# Patient Record
Sex: FEMALE | Race: WHITE | Employment: FULL TIME | URBAN - METROPOLITAN AREA
[De-identification: names, ages, dates, MRNs, and addresses within clinical notes are randomized per-mention and may not be internally consistent; named-entity substitution may affect disease eponyms.]

---

## 2017-01-31 PROCEDURE — 88305 TISSUE EXAM BY PATHOLOGIST: CPT | Performed by: INTERNAL MEDICINE

## 2017-02-16 ENCOUNTER — PATIENT MESSAGE (OUTPATIENT)
Dept: FAMILY MEDICINE CLINIC | Facility: CLINIC | Age: 52
End: 2017-02-16

## 2017-02-16 NOTE — TELEPHONE ENCOUNTER
From: Janelle Marking  To: Betty Navas DO  Sent: 2/16/2017 3:57 PM CST  Subject: Non-Urgent Medical Question    Good afternoon. Over the last couple of months I have been waking up every morning in a near panic attack.  My internal tremors are on ex

## 2017-03-05 ENCOUNTER — HOSPITAL ENCOUNTER (OUTPATIENT)
Age: 52
Discharge: HOME | End: 2017-03-05
Attending: EMERGENCY MEDICINE
Payer: COMMERCIAL

## 2017-03-05 VITALS
BODY MASS INDEX: 27 KG/M2 | WEIGHT: 180 LBS | HEART RATE: 94 BPM | RESPIRATION RATE: 17 BRPM | OXYGEN SATURATION: 97 % | TEMPERATURE: 100 F

## 2017-03-05 DIAGNOSIS — H66.001 ACUTE SUPPURATIVE OTITIS MEDIA OF RIGHT EAR WITHOUT SPONTANEOUS RUPTURE OF TYMPANIC MEMBRANE, RECURRENCE NOT SPECIFIED: Primary | ICD-10-CM

## 2017-03-05 DIAGNOSIS — R68.89 FLU-LIKE SYMPTOMS: ICD-10-CM

## 2017-03-05 PROCEDURE — 99214 OFFICE O/P EST MOD 30 MIN: CPT

## 2017-03-05 PROCEDURE — 99213 OFFICE O/P EST LOW 20 MIN: CPT

## 2017-03-05 RX ORDER — CEFDINIR 300 MG/1
300 CAPSULE ORAL 2 TIMES DAILY
Qty: 20 CAPSULE | Refills: 0 | Status: SHIPPED | OUTPATIENT
Start: 2017-03-05 | End: 2017-03-15

## 2017-03-05 RX ORDER — IBUPROFEN 800 MG/1
800 TABLET ORAL EVERY 6 HOURS PRN
COMMUNITY
End: 2017-10-05 | Stop reason: ALTCHOICE

## 2017-03-05 NOTE — ED PROVIDER NOTES
Patient presents with:  Fever  Sore Throat  Ear Problem Pain (neurosensory)    HPI:     Ruth Otero is a 47year old female who presents with chief complaint of fever, sore throat, congestion, cough and ear pain.   Started 3 days ago with sore throat media  Flu-like symptoms    Plan:  1. Omnicef Rx (discussed minimal cross reactivity)  2. Supportive care - NSAID/APAP, antihistamine, flonase  3.   F/u with PCP in 3-4 days for re-evaluation, sooner if symptoms worsen      All results reviewed and discus

## 2017-03-07 ENCOUNTER — HOSPITAL ENCOUNTER (OUTPATIENT)
Age: 52
Discharge: HOME | End: 2017-03-07
Payer: COMMERCIAL

## 2017-03-07 ENCOUNTER — APPOINTMENT (OUTPATIENT)
Dept: GENERAL RADIOLOGY | Age: 52
End: 2017-03-07
Attending: NURSE PRACTITIONER
Payer: COMMERCIAL

## 2017-03-07 VITALS
TEMPERATURE: 99 F | BODY MASS INDEX: 26.66 KG/M2 | RESPIRATION RATE: 16 BRPM | DIASTOLIC BLOOD PRESSURE: 81 MMHG | OXYGEN SATURATION: 96 % | HEART RATE: 92 BPM | WEIGHT: 180 LBS | HEIGHT: 69 IN | SYSTOLIC BLOOD PRESSURE: 135 MMHG

## 2017-03-07 DIAGNOSIS — J40 BRONCHITIS: Primary | ICD-10-CM

## 2017-03-07 LAB — POCT RAPID STREP: NEGATIVE

## 2017-03-07 PROCEDURE — 71020 XR CHEST PA + LAT CHEST (CPT=71020): CPT

## 2017-03-07 PROCEDURE — 99214 OFFICE O/P EST MOD 30 MIN: CPT

## 2017-03-07 PROCEDURE — 87430 STREP A AG IA: CPT | Performed by: NURSE PRACTITIONER

## 2017-03-07 PROCEDURE — 87081 CULTURE SCREEN ONLY: CPT | Performed by: NURSE PRACTITIONER

## 2017-03-07 RX ORDER — DOXYCYCLINE HYCLATE 100 MG/1
100 CAPSULE ORAL 2 TIMES DAILY
Qty: 14 CAPSULE | Refills: 0 | Status: SHIPPED | OUTPATIENT
Start: 2017-03-07 | End: 2017-03-14

## 2017-03-07 RX ORDER — PREDNISONE 20 MG/1
20 TABLET ORAL 2 TIMES DAILY
Qty: 10 TABLET | Refills: 0 | Status: SHIPPED | OUTPATIENT
Start: 2017-03-07 | End: 2017-03-12

## 2017-03-07 RX ORDER — CODEINE PHOSPHATE AND GUAIFENESIN 10; 100 MG/5ML; MG/5ML
5 SOLUTION ORAL EVERY 8 HOURS PRN
Qty: 120 ML | Refills: 0 | Status: SHIPPED | OUTPATIENT
Start: 2017-03-07 | End: 2017-05-08

## 2017-03-07 RX ORDER — ALBUTEROL SULFATE 90 UG/1
2 AEROSOL, METERED RESPIRATORY (INHALATION) EVERY 4 HOURS PRN
Qty: 1 INHALER | Refills: 1 | Status: SHIPPED | OUTPATIENT
Start: 2017-03-07 | End: 2017-04-06

## 2017-03-07 NOTE — ED INITIAL ASSESSMENT (HPI)
Pt here w/ c/o cough, fever at highest 102.8, sore throat.  Pt states prod only in the morning of dark brown

## 2017-03-08 NOTE — ED PROVIDER NOTES
Patient Seen in: [address] [facility]    History   Patient presents with:  Cough/URI    Stated Complaint: sore throat,cough    HPI  50-year-old female who presents to the immediate care with complaints of sore throat, productive cough, fever o puffs into the lungs every 4 (four) hours as needed. guaiFENesin-codeine (CHERATUSSIN AC) 100-10 MG/5ML Oral Solution,  Take 5 mL by mouth every 8 (eight) hours as needed for cough.    ibuprofen 800 MG Oral Tab,  Take 800 mg by mouth every 6 (six) hours a well-nourished. No distress. HENT:   Head: Normocephalic and atraumatic.    Right Ear: Hearing, tympanic membrane and ear canal normal.   Left Ear: Hearing, tympanic membrane, external ear and ear canal normal.   Nose: Right sinus exhibits maxillary sinus laboratory results with the patient. I discussed the diagnosis and need for followup with their primary care physician for further evaluation and care.  Patient states they understand diagnosis, followup plan and agree with and understand  discharge instruc

## 2017-03-13 ENCOUNTER — PATIENT MESSAGE (OUTPATIENT)
Dept: FAMILY MEDICINE CLINIC | Facility: CLINIC | Age: 52
End: 2017-03-13

## 2017-03-13 NOTE — TELEPHONE ENCOUNTER
Was seen in the IC on 3/5/17 and 3/7/17 for sore throat and cough  Given meds:  Doxycycline Hyclate 100 MG Oral Ca pX 7 days  predniSONE 20 MG Oral Tab X 5 days  Albuterol Sulfate  (90 Base) MCG/ACT Inhalation Aero Soln and guaiFENesin-codeine (KENNEDI

## 2017-03-13 NOTE — TELEPHONE ENCOUNTER
From: Venice Irving  To: Luis Garcia DO  Sent: 3/13/2017 8:06 AM CDT  Subject: Non-Urgent Medical Question    Cleavon Seip to urgent care on 3-5 with flu symptoms, fever and ear ache. I was given an antibiotic which I take twice per day.  I went back to urge

## 2017-05-02 DIAGNOSIS — E04.2 MULTIPLE THYROID NODULES: Primary | ICD-10-CM

## 2017-05-02 NOTE — PROGRESS NOTES
Notified via my chart to schedule appt for thyroid US,  Also referred to podiatry, Referred to Dr. Morales December and [address], [address] [phone]

## 2017-05-08 ENCOUNTER — HOSPITAL ENCOUNTER (OUTPATIENT)
Age: 52
Discharge: HOME | End: 2017-05-08
Attending: FAMILY MEDICINE
Payer: COMMERCIAL

## 2017-05-08 VITALS
HEIGHT: 69 IN | BODY MASS INDEX: 26.66 KG/M2 | WEIGHT: 180 LBS | RESPIRATION RATE: 14 BRPM | OXYGEN SATURATION: 97 % | DIASTOLIC BLOOD PRESSURE: 79 MMHG | SYSTOLIC BLOOD PRESSURE: 113 MMHG | HEART RATE: 76 BPM | TEMPERATURE: 98 F

## 2017-05-08 DIAGNOSIS — N30.00 ACUTE CYSTITIS WITHOUT HEMATURIA: Primary | ICD-10-CM

## 2017-05-08 PROCEDURE — 99214 OFFICE O/P EST MOD 30 MIN: CPT

## 2017-05-08 PROCEDURE — 87086 URINE CULTURE/COLONY COUNT: CPT | Performed by: FAMILY MEDICINE

## 2017-05-08 PROCEDURE — 87147 CULTURE TYPE IMMUNOLOGIC: CPT | Performed by: FAMILY MEDICINE

## 2017-05-08 PROCEDURE — 81002 URINALYSIS NONAUTO W/O SCOPE: CPT | Performed by: FAMILY MEDICINE

## 2017-05-08 RX ORDER — PHENAZOPYRIDINE HYDROCHLORIDE 200 MG/1
200 TABLET, FILM COATED ORAL 3 TIMES DAILY PRN
Qty: 6 TABLET | Refills: 0 | Status: SHIPPED | OUTPATIENT
Start: 2017-05-08 | End: 2017-05-10

## 2017-05-08 RX ORDER — NITROFURANTOIN 25; 75 MG/1; MG/1
100 CAPSULE ORAL 2 TIMES DAILY
Qty: 14 CAPSULE | Refills: 0 | Status: SHIPPED | OUTPATIENT
Start: 2017-05-08 | End: 2021-01-21

## 2017-05-08 NOTE — ED PROVIDER NOTES
Patient presents with:  Urinary Symptoms (urologic)    HPI:     Oneyda Motley is a 47year old female who presents with suspected symptoms of UTI  Onset of symptoms: 2  days  Complains of:  - Dysuria: yes   - Frequency and  urgency of urination: yes tenderness.   Lungs: clear to auscultation bilaterally  Heart: S1, S2 normal, no murmur, click, rub or gallop, regular rate and rhythm  Abdomen: soft, non-tender; bowel sounds normal; no masses,  no organomegaly  Pulses: 2+ and symmetric  Skin: Skin color, days  Urine culture ordered   Push fluids  Cranberry juice  Monitor for amount of bleeding in urine  Monitor urine output and fluid intake  Present to ED for any worsening abdominal pain, hematuria, difficulty urination, acute urinary retention, worsening

## 2017-05-08 NOTE — ED INITIAL ASSESSMENT (HPI)
Pt sts began with dysuria on Saturday. Today with frequency, worsening dysuria. Denies back pain, fever, nausea.

## 2017-05-11 ENCOUNTER — HOSPITAL ENCOUNTER (OUTPATIENT)
Dept: ULTRASOUND IMAGING | Age: 52
Discharge: HOME | End: 2017-05-11
Attending: FAMILY MEDICINE
Payer: COMMERCIAL

## 2017-05-11 DIAGNOSIS — E04.2 MULTIPLE THYROID NODULES: ICD-10-CM

## 2017-05-11 PROCEDURE — 76536 US EXAM OF HEAD AND NECK: CPT | Performed by: FAMILY MEDICINE

## 2017-05-12 ENCOUNTER — TELEPHONE (OUTPATIENT)
Dept: FAMILY MEDICINE CLINIC | Facility: CLINIC | Age: 52
End: 2017-05-12

## 2017-05-12 NOTE — TELEPHONE ENCOUNTER
Left message on answering machine for patient to return phone call. Recall placed in computer for 1 year,.

## 2017-05-12 NOTE — TELEPHONE ENCOUNTER
----- Message from Erin Rodriguez DO sent at 5/12/2017  8:15 AM CDT -----  Can notify Ayaka Christian her tyroid nodules have not changed, and recommend a recall US in 1 year

## 2017-05-19 ENCOUNTER — TELEPHONE (OUTPATIENT)
Dept: FAMILY MEDICINE CLINIC | Facility: CLINIC | Age: 52
End: 2017-05-19

## 2017-05-19 DIAGNOSIS — Z01.818 PREOP EXAMINATION: Primary | ICD-10-CM

## 2017-05-19 NOTE — TELEPHONE ENCOUNTER
Orders are in folder. Labs and ekg ordered per request from Dr. Munoz [facility] office.

## 2017-06-04 ENCOUNTER — HOSPITAL ENCOUNTER (OUTPATIENT)
Age: 52
Discharge: HOME | End: 2017-06-04
Payer: COMMERCIAL

## 2017-06-04 VITALS
OXYGEN SATURATION: 99 % | HEART RATE: 83 BPM | HEIGHT: 69 IN | BODY MASS INDEX: 26.66 KG/M2 | DIASTOLIC BLOOD PRESSURE: 90 MMHG | SYSTOLIC BLOOD PRESSURE: 124 MMHG | WEIGHT: 180 LBS | RESPIRATION RATE: 16 BRPM | TEMPERATURE: 97 F

## 2017-06-04 DIAGNOSIS — N30.01 ACUTE CYSTITIS WITH HEMATURIA: Primary | ICD-10-CM

## 2017-06-04 PROCEDURE — 87086 URINE CULTURE/COLONY COUNT: CPT | Performed by: PHYSICIAN ASSISTANT

## 2017-06-04 PROCEDURE — 87147 CULTURE TYPE IMMUNOLOGIC: CPT | Performed by: PHYSICIAN ASSISTANT

## 2017-06-04 PROCEDURE — 81002 URINALYSIS NONAUTO W/O SCOPE: CPT | Performed by: PHYSICIAN ASSISTANT

## 2017-06-04 PROCEDURE — 99214 OFFICE O/P EST MOD 30 MIN: CPT

## 2017-06-04 RX ORDER — SULFAMETHOXAZOLE AND TRIMETHOPRIM 800; 160 MG/1; MG/1
1 TABLET ORAL 2 TIMES DAILY
Qty: 14 TABLET | Refills: 0 | Status: SHIPPED | OUTPATIENT
Start: 2017-06-04 | End: 2017-06-11

## 2017-06-04 NOTE — ED INITIAL ASSESSMENT (HPI)
[facility], voiding in small amounts, dysuria,sensation of incomplete emptying of her bladder since this morning. Pt denies flank pain or fever.

## 2017-06-04 NOTE — ED PROVIDER NOTES
Patient Seen in: [address] [facility]    History   Patient presents with:  UTI    Stated Complaint: FREQ URINATION/PAIN/BURNING    HPI    Patient is a 58-year-old female.   Patient awoke this morning with sudden onset urinary frequency, dysuria Status: Never Used                        Alcohol Use: Yes           1.8 oz/week       3 Standard drinks or equivalent per week      Review of Systems    Positive for stated complaint: FREQ URINATION/PAIN/BURNING  Other systems are as noted in HPI.   Juma penicillin allergy. Will initiate Bactrim. Urine culture will be sent. I discussed with patient that if she has further repetitive episodes of UTI, she should seek follow-up with urology.   At time of discharge, patient is well-appearing with normal parul

## 2017-06-19 ENCOUNTER — OFFICE VISIT (OUTPATIENT)
Dept: FAMILY MEDICINE CLINIC | Facility: CLINIC | Age: 52
End: 2017-06-19

## 2017-06-19 VITALS
BODY MASS INDEX: 27.03 KG/M2 | HEIGHT: 68 IN | RESPIRATION RATE: 16 BRPM | SYSTOLIC BLOOD PRESSURE: 108 MMHG | TEMPERATURE: 99 F | DIASTOLIC BLOOD PRESSURE: 64 MMHG | HEART RATE: 68 BPM | WEIGHT: 178.38 LBS

## 2017-06-19 DIAGNOSIS — Z01.818 PREOP EXAMINATION: Primary | ICD-10-CM

## 2017-06-19 DIAGNOSIS — M21.612 BUNION, LEFT FOOT: ICD-10-CM

## 2017-06-19 DIAGNOSIS — Z00.00 ROUTINE HEALTH MAINTENANCE: ICD-10-CM

## 2017-06-19 DIAGNOSIS — M79.672 FOOT PAIN, LEFT: ICD-10-CM

## 2017-06-19 DIAGNOSIS — M20.12 HALLUX VALGUS WITH BUNIONS OF LEFT FOOT: ICD-10-CM

## 2017-06-19 DIAGNOSIS — I15.9 SECONDARY HYPERTENSION: ICD-10-CM

## 2017-06-19 DIAGNOSIS — M21.612 HALLUX VALGUS WITH BUNIONS OF LEFT FOOT: ICD-10-CM

## 2017-06-19 PROCEDURE — 93000 ELECTROCARDIOGRAM COMPLETE: CPT | Performed by: FAMILY MEDICINE

## 2017-06-19 PROCEDURE — 99244 OFF/OP CNSLTJ NEW/EST MOD 40: CPT | Performed by: FAMILY MEDICINE

## 2017-06-19 NOTE — PROGRESS NOTES
Janelle Bell is a 47year old female who presents for a pre-operative physical exam. Patient is to have Bunion repair of the left 1st and 5th toes, to be done by Dr. Papo Carr at [facility] on 6/29/17.       HPI:   Pt complains of persistent left foot p GENERAL: feels well otherwise  SKIN: denies any unusual skin lesions  EYES:denies blurred vision or double vision  HEENT: denies nasal congestion, sinus pain or ST  LUNGS: denies shortness of breath with exertion  CARDIOVASCULAR: denies chest pain on exe [E]  CMP/CBC  Meds & Refills for this Visit:  No prescriptions requested or ordered in this encounter    Imaging & Consults:  ELECTROCARDIOGRAM, COMPLETE  Shows sinus bradycardia, otherwise normal EKG    MEDICALLY CLEAR FOR SURGERY

## 2017-06-21 ENCOUNTER — TELEPHONE (OUTPATIENT)
Dept: FAMILY MEDICINE CLINIC | Facility: CLINIC | Age: 52
End: 2017-06-21

## 2017-06-21 NOTE — TELEPHONE ENCOUNTER
Pt will have pre-op blood work drawn this weekend. Will fax H/P, EKG, and lab results once pt is cleared.

## 2017-06-24 ENCOUNTER — NURSE ONLY (OUTPATIENT)
Dept: FAMILY MEDICINE CLINIC | Facility: CLINIC | Age: 52
End: 2017-06-24

## 2017-06-24 DIAGNOSIS — Z01.818 PREOP EXAMINATION: ICD-10-CM

## 2017-06-24 DIAGNOSIS — M20.12 HALLUX VALGUS WITH BUNIONS OF LEFT FOOT: ICD-10-CM

## 2017-06-24 DIAGNOSIS — M21.612 BUNION, LEFT FOOT: ICD-10-CM

## 2017-06-24 DIAGNOSIS — I15.9 SECONDARY HYPERTENSION: ICD-10-CM

## 2017-06-24 DIAGNOSIS — M21.612 HALLUX VALGUS WITH BUNIONS OF LEFT FOOT: ICD-10-CM

## 2017-06-24 DIAGNOSIS — Z00.00 ROUTINE HEALTH MAINTENANCE: ICD-10-CM

## 2017-06-24 DIAGNOSIS — M79.672 FOOT PAIN, LEFT: ICD-10-CM

## 2017-06-24 PROCEDURE — 80050 GENERAL HEALTH PANEL: CPT | Performed by: FAMILY MEDICINE

## 2017-06-24 PROCEDURE — 80061 LIPID PANEL: CPT | Performed by: FAMILY MEDICINE

## 2017-06-24 PROCEDURE — 36415 COLL VENOUS BLD VENIPUNCTURE: CPT | Performed by: FAMILY MEDICINE

## 2017-06-24 PROCEDURE — 85610 PROTHROMBIN TIME: CPT | Performed by: FAMILY MEDICINE

## 2017-06-24 PROCEDURE — 85730 THROMBOPLASTIN TIME PARTIAL: CPT | Performed by: FAMILY MEDICINE

## 2017-06-26 ENCOUNTER — TELEPHONE (OUTPATIENT)
Dept: FAMILY MEDICINE CLINIC | Facility: CLINIC | Age: 52
End: 2017-06-26

## 2017-06-26 NOTE — TELEPHONE ENCOUNTER
Notes Recorded by Erica Salinas DO on 6/26/2017 at 4:59 PM CDT  Can forward with H&P  ------    Notes Recorded by Erica Salinas DO on 6/24/2017 at 9:49 PM CDT  Can notify. Cheyenne Rhodes her labs  look very good thyroid.  Blood count kidney and liver  function a

## 2017-06-26 NOTE — TELEPHONE ENCOUNTER
Pt notified by phone and verbalized understanding. Labs, EKG, and clearance faxed to Dr. Stephenie Way at [phone] and [address] [place] [address] at [phone].

## 2017-06-27 ENCOUNTER — TELEPHONE (OUTPATIENT)
Dept: FAMILY MEDICINE CLINIC | Facility: CLINIC | Age: 52
End: 2017-06-27

## 2017-06-27 NOTE — TELEPHONE ENCOUNTER
Dr Rowdy Ag office called, they needs a signed copy of the H&P, The last one there received was not signed,.  Please Fax  To  [phone]

## 2017-08-17 ENCOUNTER — PATIENT OUTREACH (OUTPATIENT)
Dept: FAMILY MEDICINE CLINIC | Facility: CLINIC | Age: 52
End: 2017-08-17

## 2017-08-17 NOTE — PROGRESS NOTES
Rubén Herman is due for mammogram.   Last mammogram date:  None in Epic. Mychart/Letter sent to patient.

## 2017-10-05 ENCOUNTER — OFFICE VISIT (OUTPATIENT)
Dept: FAMILY MEDICINE CLINIC | Facility: CLINIC | Age: 52
End: 2017-10-05

## 2017-10-05 VITALS
SYSTOLIC BLOOD PRESSURE: 112 MMHG | BODY MASS INDEX: 26.51 KG/M2 | HEART RATE: 80 BPM | DIASTOLIC BLOOD PRESSURE: 72 MMHG | OXYGEN SATURATION: 98 % | WEIGHT: 179 LBS | RESPIRATION RATE: 14 BRPM | TEMPERATURE: 98 F | HEIGHT: 69 IN

## 2017-10-05 DIAGNOSIS — M85.672 OTHER CYST OF BONE, LEFT ANKLE AND FOOT: ICD-10-CM

## 2017-10-05 DIAGNOSIS — Z01.818 PRE-OP EXAMINATION: ICD-10-CM

## 2017-10-05 DIAGNOSIS — M79.672 LEFT FOOT PAIN: Primary | ICD-10-CM

## 2017-10-05 PROCEDURE — 85730 THROMBOPLASTIN TIME PARTIAL: CPT | Performed by: FAMILY MEDICINE

## 2017-10-05 PROCEDURE — 85025 COMPLETE CBC W/AUTO DIFF WBC: CPT | Performed by: FAMILY MEDICINE

## 2017-10-05 PROCEDURE — 36415 COLL VENOUS BLD VENIPUNCTURE: CPT | Performed by: FAMILY MEDICINE

## 2017-10-05 PROCEDURE — 99243 OFF/OP CNSLTJ NEW/EST LOW 30: CPT | Performed by: FAMILY MEDICINE

## 2017-10-05 PROCEDURE — 93000 ELECTROCARDIOGRAM COMPLETE: CPT | Performed by: FAMILY MEDICINE

## 2017-10-05 PROCEDURE — 80053 COMPREHEN METABOLIC PANEL: CPT | Performed by: FAMILY MEDICINE

## 2017-10-05 PROCEDURE — 85610 PROTHROMBIN TIME: CPT | Performed by: FAMILY MEDICINE

## 2017-10-05 NOTE — H&P
Venice Irving is a 47year old female who presents for a pre-operative physical exam. Patient is to have removal of hardware and removal of cyst of the left foot, to be done by Dr. Danielle Murry at [facility] on 10/13/17.       HPI:   Pt complains : . Children: one grown son.    Exercise: minimal.  Diet: watches minimally     REVIEW OF SYSTEMS:   GENERAL: feels well otherwise  SKIN: denies any unusual skin lesions  EYES:denies blurred vision or double vision  HEENT: denies nasal congest injections      Complex regional pain syndrome type 1 of right upper extremity     Multiple thyroid nodules - stable, last checked in May 2017. Lev Álvarez Pt has no significant history of cardiac or pulmonary conditions.  Pt is a good surgical candidate and ma

## 2017-10-06 DIAGNOSIS — E87.6 HYPOKALEMIA: Primary | ICD-10-CM

## 2017-10-06 RX ORDER — POTASSIUM CHLORIDE 1500 MG/1
40 TABLET, FILM COATED, EXTENDED RELEASE ORAL ONCE
Qty: 2 TABLET | Refills: 0 | Status: SHIPPED | OUTPATIENT
Start: 2017-10-06 | End: 2017-10-06

## 2017-10-06 RX ORDER — POTASSIUM CHLORIDE 1500 MG/1
TABLET, FILM COATED, EXTENDED RELEASE ORAL
Qty: 180 TABLET | Refills: 0 | OUTPATIENT
Start: 2017-10-06

## 2017-10-06 NOTE — TELEPHONE ENCOUNTER
Detailed message left for pt with results and instructions to call back to confirm pharmacy. Pt will also need repeat potassium level on Tuesday 10/10/17 per Dr. Chuck Li. Order entered.

## 2017-10-06 NOTE — TELEPHONE ENCOUNTER
----- Message from Luna De Los Santos DO sent at 10/6/2017  3:11 PM CDT -----  Pls let pt know that her labs show a low potassium level. I will send in potassium tablets, I would like her to take 40 meq once. All other labs looked okay.  (the PT is still pendi

## 2017-10-07 RX ORDER — POTASSIUM CHLORIDE 1500 MG/1
TABLET, FILM COATED, EXTENDED RELEASE ORAL
Qty: 2 TABLET | Refills: 0 | OUTPATIENT
Start: 2017-10-07

## 2017-10-07 NOTE — TELEPHONE ENCOUNTER
Pt is only to take potassium 40 mEq once. She will have potassium level drawn Tuesday for recheck. No additional tablets needed at this time.

## 2017-10-10 ENCOUNTER — NURSE ONLY (OUTPATIENT)
Dept: FAMILY MEDICINE CLINIC | Facility: CLINIC | Age: 52
End: 2017-10-10

## 2017-10-10 DIAGNOSIS — E87.6 HYPOKALEMIA: ICD-10-CM

## 2017-10-10 PROCEDURE — 84132 ASSAY OF SERUM POTASSIUM: CPT | Performed by: FAMILY MEDICINE

## 2017-10-10 PROCEDURE — 36415 COLL VENOUS BLD VENIPUNCTURE: CPT | Performed by: FAMILY MEDICINE

## 2017-10-30 ENCOUNTER — TELEPHONE (OUTPATIENT)
Dept: FAMILY MEDICINE CLINIC | Facility: CLINIC | Age: 52
End: 2017-10-30

## 2017-10-30 NOTE — TELEPHONE ENCOUNTER
Patient is scheduled for 12/06/17 for a pre-op px. Patient is having bunion surgery on 12/15/17 at [facility] by Dr Betsey Carrel. No blood work needed just an EKG and px. Dr Betsey Carrel to send over orders.

## 2017-10-30 NOTE — TELEPHONE ENCOUNTER
Noted.    Future Appointments  Date Time Provider Irvin Dela Cruz   12/6/2017 4:15 PM Dorothy Ruelas [facility] EMG Josue Sue

## 2017-11-15 RX ORDER — TRIAMTERENE AND HYDROCHLOROTHIAZIDE 75; 50 MG/1; MG/1
TABLET ORAL
Qty: 45 TABLET | Refills: 6 | Status: SHIPPED | OUTPATIENT
Start: 2017-11-15 | End: 2018-12-13

## 2017-11-15 NOTE — TELEPHONE ENCOUNTER
LOV  10/05/2017  Marian Canjilon  Last refill  05/21/2014  #30 w. 6 RF  Future Appointments  Date Time Provider Irvin Dela Cruz   12/6/2017 4:15 PM Sondra Arzate [facility] ACE Grewal

## 2017-12-22 ENCOUNTER — OFFICE VISIT (OUTPATIENT)
Dept: FAMILY MEDICINE CLINIC | Facility: CLINIC | Age: 52
End: 2017-12-22

## 2017-12-22 ENCOUNTER — HOSPITAL ENCOUNTER (OUTPATIENT)
Dept: GENERAL RADIOLOGY | Age: 52
Discharge: HOME | End: 2017-12-22
Attending: FAMILY MEDICINE
Payer: COMMERCIAL

## 2017-12-22 VITALS
HEART RATE: 86 BPM | WEIGHT: 183 LBS | TEMPERATURE: 98 F | SYSTOLIC BLOOD PRESSURE: 120 MMHG | BODY MASS INDEX: 27 KG/M2 | DIASTOLIC BLOOD PRESSURE: 70 MMHG
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
## 2017-12-22 DIAGNOSIS — S89.91XA INJURY OF RIGHT KNEE, INITIAL ENCOUNTER: Primary | ICD-10-CM

## 2017-12-22 DIAGNOSIS — S89.91XA INJURY OF RIGHT KNEE, INITIAL ENCOUNTER: ICD-10-CM

## 2017-12-22 PROCEDURE — 99213 OFFICE O/P EST LOW 20 MIN: CPT | Performed by: FAMILY MEDICINE

## 2017-12-22 PROCEDURE — 73562 X-RAY EXAM OF KNEE 3: CPT | Performed by: FAMILY MEDICINE

## 2017-12-22 NOTE — PROGRESS NOTES
Nellie Jordan is a 47year old female. Patient presents with:  Knee Pain: right knee, fell down onto it, ice kating, swelling, hyper senitive skin.       HPI:   Esthela Bolls ice skating 1 week ago and feel right on the right knee, fell on the knee cap, and rol lb  03/07/17 : 180 lb      REVIEW OF SYSTEMS:   GENERAL HEALTH: feels well no complaints other than above   SKIN: denies any unusual skin lesions or rashes  RESPIRATORY: denies shortness of breath with exertion   CARDIOVASCULAR: denies chest pain on exerti

## 2018-05-14 ENCOUNTER — TELEPHONE (OUTPATIENT)
Dept: FAMILY MEDICINE CLINIC | Facility: CLINIC | Age: 53
End: 2018-05-14

## 2018-05-14 DIAGNOSIS — E04.2 MULTIPLE THYROID NODULES: Primary | ICD-10-CM

## 2018-05-14 NOTE — TELEPHONE ENCOUNTER
Pt is due for year follow up on thyroid nodules. Please place order and we can call patient to have her schedule test if appropriate. Forward to Dr. West Peace.

## 2018-06-12 ENCOUNTER — HOSPITAL ENCOUNTER (OUTPATIENT)
Age: 53
Discharge: HOME | End: 2018-06-12
Attending: FAMILY MEDICINE
Payer: COMMERCIAL

## 2018-06-12 VITALS
TEMPERATURE: 98 F | OXYGEN SATURATION: 97 % | RESPIRATION RATE: 16 BRPM | HEART RATE: 65 BPM | DIASTOLIC BLOOD PRESSURE: 89 MMHG | SYSTOLIC BLOOD PRESSURE: 127 MMHG

## 2018-06-12 DIAGNOSIS — J01.90 ACUTE NON-RECURRENT SINUSITIS, UNSPECIFIED LOCATION: Primary | ICD-10-CM

## 2018-06-12 DIAGNOSIS — H66.001 ACUTE SUPPURATIVE OTITIS MEDIA OF RIGHT EAR WITHOUT SPONTANEOUS RUPTURE OF TYMPANIC MEMBRANE, RECURRENCE NOT SPECIFIED: ICD-10-CM

## 2018-06-12 PROCEDURE — 99213 OFFICE O/P EST LOW 20 MIN: CPT

## 2018-06-12 PROCEDURE — 99214 OFFICE O/P EST MOD 30 MIN: CPT

## 2018-06-12 RX ORDER — CLARITHROMYCIN 500 MG/1
500 TABLET, COATED ORAL 2 TIMES DAILY
Qty: 20 TABLET | Refills: 0 | Status: SHIPPED | OUTPATIENT
Start: 2018-06-12 | End: 2018-06-22

## 2018-06-12 RX ORDER — FLUTICASONE PROPIONATE 50 MCG
2 SPRAY, SUSPENSION (ML) NASAL DAILY
Qty: 16 G | Refills: 0 | Status: SHIPPED | OUTPATIENT
Start: 2018-06-12 | End: 2018-07-11

## 2018-06-12 NOTE — ED PROVIDER NOTES
Patient Seen in: [address] [facility]    History   Patient presents with:  Sinusitis  Allergic Rxn Allergies (immune)    Stated Complaint: cold-like symptoms    HPI    58-year-old who presents for sinus pressure, congestion and cough.  It start systems are as noted in HPI. Constitutional and vital signs reviewed. All other systems reviewed and negative except as noted above.     Physical Exam   ED Triage Vitals [06/12/18 1715]  BP: 127/89  Pulse: 65  Resp: 16  Temp: 97.5 °F (36.4 °C)  Temp s diagnosis)  Acute suppurative otitis media of right ear without spontaneous rupture of tympanic membrane, recurrence not specified    Disposition:  Discharge  6/12/2018  5:31 pm    Follow-up:  Addie Sanders, [address] [facility] [phone]  6

## 2018-06-12 NOTE — ED INITIAL ASSESSMENT (HPI)
Pt states she has had sinus issues since last week with sneezing and congestion. States since last night neck soreness, worsening congestion and itchy sensation to face and head. Taking dayquil, no benadry or allergy meds. No rash noted.  Felt her lymph nod

## 2018-07-11 ENCOUNTER — OFFICE VISIT (OUTPATIENT)
Dept: FAMILY MEDICINE CLINIC | Facility: CLINIC | Age: 53
End: 2018-07-11

## 2018-07-11 ENCOUNTER — HOSPITAL ENCOUNTER (OUTPATIENT)
Dept: GENERAL RADIOLOGY | Age: 53
Discharge: HOME | End: 2018-07-11
Attending: FAMILY MEDICINE
Payer: COMMERCIAL

## 2018-07-11 VITALS
WEIGHT: 188.19 LBS | RESPIRATION RATE: 14 BRPM | DIASTOLIC BLOOD PRESSURE: 80 MMHG | BODY MASS INDEX: 28.52 KG/M2 | TEMPERATURE: 99 F | HEART RATE: 70 BPM | SYSTOLIC BLOOD PRESSURE: 120 MMHG | HEIGHT: 68 IN

## 2018-07-11 DIAGNOSIS — M79.672 FOOT PAIN, LEFT: ICD-10-CM

## 2018-07-11 DIAGNOSIS — M25.522 PAIN OF BOTH ELBOWS: ICD-10-CM

## 2018-07-11 DIAGNOSIS — M25.532 BILATERAL WRIST PAIN: Primary | ICD-10-CM

## 2018-07-11 DIAGNOSIS — R20.2 PARESTHESIA OF BOTH HANDS: ICD-10-CM

## 2018-07-11 DIAGNOSIS — M25.531 BILATERAL WRIST PAIN: Primary | ICD-10-CM

## 2018-07-11 DIAGNOSIS — M25.521 PAIN OF BOTH ELBOWS: ICD-10-CM

## 2018-07-11 LAB
ALBUMIN SERPL-MCNC: 3.7 G/DL (ref 3.5–4.8)
ALP LIVER SERPL-CCNC: 97 U/L (ref 41–108)
ALT SERPL-CCNC: 23 U/L (ref 14–54)
AST SERPL-CCNC: 20 U/L (ref 15–41)
BILIRUB SERPL-MCNC: 0.7 MG/DL (ref 0.1–2)
BUN BLD-MCNC: 16 MG/DL (ref 8–20)
CALCIUM BLD-MCNC: 9 MG/DL (ref 8.3–10.3)
CHLORIDE: 102 MMOL/L (ref 101–111)
CO2: 29 MMOL/L (ref 22–32)
CREAT BLD-MCNC: 0.97 MG/DL (ref 0.55–1.02)
FREE T4: 1 NG/DL (ref 0.9–1.8)
GLUCOSE BLD-MCNC: 88 MG/DL (ref 70–99)
M PROTEIN MFR SERPL ELPH: 7.4 G/DL (ref 6.1–8.3)
POTASSIUM SERPL-SCNC: 3.6 MMOL/L (ref 3.6–5.1)
SODIUM SERPL-SCNC: 139 MMOL/L (ref 136–144)
TSI SER-ACNC: 3.09 MIU/ML (ref 0.35–5.5)

## 2018-07-11 PROCEDURE — 84439 ASSAY OF FREE THYROXINE: CPT | Performed by: FAMILY MEDICINE

## 2018-07-11 PROCEDURE — 84443 ASSAY THYROID STIM HORMONE: CPT | Performed by: FAMILY MEDICINE

## 2018-07-11 PROCEDURE — 73630 X-RAY EXAM OF FOOT: CPT | Performed by: FAMILY MEDICINE

## 2018-07-11 PROCEDURE — 80053 COMPREHEN METABOLIC PANEL: CPT | Performed by: FAMILY MEDICINE

## 2018-07-11 PROCEDURE — 99214 OFFICE O/P EST MOD 30 MIN: CPT | Performed by: FAMILY MEDICINE

## 2018-07-11 PROCEDURE — 36415 COLL VENOUS BLD VENIPUNCTURE: CPT | Performed by: FAMILY MEDICINE

## 2018-07-11 NOTE — PROGRESS NOTES
Janelle Bell is a 47year old female. HPI:   Case Michaud is here for evaluation of bilateral wrist pain, she cannot recall any trauma, but she is on a computer all day and denies any elbow pain.  She also has left foot pain, in the same area where she had suspicious lesions  HEENT: atraumatic, normocephalic,ears and throat are clear  NECK: supple,no adenopathy,no bruits  LUNGS: clear to auscultation  CARDIO: RRR without murmur  GI: good BS's,no masses, HSM or tenderness  EXTREMITIES: no cyanosis, clubbing o

## 2018-07-12 ENCOUNTER — TELEPHONE (OUTPATIENT)
Dept: FAMILY MEDICINE CLINIC | Facility: CLINIC | Age: 53
End: 2018-07-12

## 2018-07-12 NOTE — TELEPHONE ENCOUNTER
----- Message from Lorie Gentile DO sent at 7/12/2018  8:05 AM CDT -----  Can notify Luke March, that her labs all look fine, and her thyroid labs are good as well, lets try and increase her potassium intake ,maybe some OJ and a banana daily, also makes sure

## 2018-07-23 DIAGNOSIS — M79.672 PAIN IN BOTH FEET: Primary | ICD-10-CM

## 2018-07-23 DIAGNOSIS — M79.671 PAIN IN BOTH FEET: Primary | ICD-10-CM

## 2018-08-20 PROBLEM — M77.42 METATARSALGIA, LEFT FOOT: Status: ACTIVE | Noted: 2018-08-20

## 2018-10-22 ENCOUNTER — HOSPITAL ENCOUNTER (OUTPATIENT)
Age: 53
Discharge: HOME | End: 2018-10-22
Attending: FAMILY MEDICINE
Payer: COMMERCIAL

## 2018-10-22 VITALS
DIASTOLIC BLOOD PRESSURE: 79 MMHG | WEIGHT: 180 LBS | TEMPERATURE: 98 F | RESPIRATION RATE: 16 BRPM | HEART RATE: 64 BPM | HEIGHT: 69 IN | SYSTOLIC BLOOD PRESSURE: 119 MMHG | BODY MASS INDEX: 26.66 KG/M2 | OXYGEN SATURATION: 97 %

## 2018-10-22 DIAGNOSIS — J30.89 NON-SEASONAL ALLERGIC RHINITIS, UNSPECIFIED TRIGGER: ICD-10-CM

## 2018-10-22 DIAGNOSIS — J01.00 ACUTE NON-RECURRENT MAXILLARY SINUSITIS: ICD-10-CM

## 2018-10-22 DIAGNOSIS — R06.02 SHORTNESS OF BREATH: Primary | ICD-10-CM

## 2018-10-22 DIAGNOSIS — J98.01 ACUTE BRONCHOSPASM: ICD-10-CM

## 2018-10-22 PROCEDURE — 94640 AIRWAY INHALATION TREATMENT: CPT

## 2018-10-22 PROCEDURE — 99214 OFFICE O/P EST MOD 30 MIN: CPT

## 2018-10-22 RX ORDER — PREDNISONE 20 MG/1
TABLET ORAL
Qty: 10 TABLET | Refills: 0 | Status: SHIPPED | OUTPATIENT
Start: 2018-10-22 | End: 2019-02-28

## 2018-10-22 RX ORDER — ALBUTEROL SULFATE 90 UG/1
2 AEROSOL, METERED RESPIRATORY (INHALATION) EVERY 4 HOURS PRN
Qty: 1 INHALER | Refills: 0 | Status: SHIPPED | OUTPATIENT
Start: 2018-10-22 | End: 2019-02-28

## 2018-10-22 RX ORDER — IPRATROPIUM BROMIDE AND ALBUTEROL SULFATE 2.5; .5 MG/3ML; MG/3ML
3 SOLUTION RESPIRATORY (INHALATION) ONCE
Status: COMPLETED | OUTPATIENT
Start: 2018-10-22 | End: 2018-10-22

## 2018-10-22 RX ORDER — AMOXICILLIN AND CLAVULANATE POTASSIUM 875; 125 MG/1; MG/1
875 TABLET, FILM COATED ORAL 2 TIMES DAILY
Qty: 20 TABLET | Refills: 0 | Status: SHIPPED | OUTPATIENT
Start: 2018-10-22 | End: 2019-02-28

## 2018-10-22 RX ORDER — PREDNISONE 20 MG/1
40 TABLET ORAL ONCE
Status: COMPLETED | OUTPATIENT
Start: 2018-10-22 | End: 2018-10-22

## 2018-10-22 NOTE — ED PROVIDER NOTES
Patient Seen in: [address] [facility]    History   Patient presents with:  Sinusitis  Laryngitis  Shortness Of Breath    Stated Complaint: hard to breath voice is hoarse    HPI    This 67-year-old female presents to the office with complaint o POLYPECTOMY 19729 N/A 1/31/2017    Performed by Surekha Hernandez MD at [address] [place] [address]  1/17    esophageal nodule (papilloma)       Family history reviewed and is not pertinent to presenting problem.     Social History    Tobacco Use office. Repeat lung exam shows increase in air exchange. Patient states that she feels like her chest is looser and she no longer feels short of breath. She is stable for discharge to home. Symptomatic treatment is discussed.   I do believe that the p Wheezing or Shortness of Breath. CARRY YOUR INHALER WITH YOU. Qty: 1 Inhaler Refills: 0           Take the Augmentin 875 mg 1 tablet with breakfast and dinner for the next 10 days.   While you are on the antibiotics, eat yogurt or take probiotics to help r

## 2018-12-13 RX ORDER — TRIAMTERENE AND HYDROCHLOROTHIAZIDE 75; 50 MG/1; MG/1
TABLET ORAL
Qty: 45 TABLET | Refills: 6 | Status: SHIPPED | OUTPATIENT
Start: 2018-12-13 | End: 2019-08-13 | Stop reason: SDUPTHER

## 2018-12-13 NOTE — TELEPHONE ENCOUNTER
Last refill listed as historical  Last BUN 16, creatinine 0.97 on 7/11/18  Last OV 7/11/18, /80  No future appointments. Thank you.

## 2019-01-07 DIAGNOSIS — R25.1 TREMOR DUE TO DISORDER OF CNS: Primary | ICD-10-CM

## 2019-01-07 DIAGNOSIS — G96.9 TREMOR DUE TO DISORDER OF CNS: Primary | ICD-10-CM

## 2019-01-07 DIAGNOSIS — R27.9 COORDINATION DISORDER: ICD-10-CM

## 2019-02-28 ENCOUNTER — APPOINTMENT (OUTPATIENT)
Dept: LAB | Age: 54
End: 2019-02-28
Attending: Other
Payer: COMMERCIAL

## 2019-02-28 ENCOUNTER — OFFICE VISIT (OUTPATIENT)
Dept: NEUROLOGY | Facility: CLINIC | Age: 54
End: 2019-02-28

## 2019-02-28 VITALS
DIASTOLIC BLOOD PRESSURE: 86 MMHG | HEART RATE: 60 BPM | RESPIRATION RATE: 16 BRPM | HEIGHT: 68 IN | SYSTOLIC BLOOD PRESSURE: 120 MMHG | BODY MASS INDEX: 29.7 KG/M2 | WEIGHT: 196 LBS

## 2019-02-28 DIAGNOSIS — R29.898 RIGHT HAND WEAKNESS: ICD-10-CM

## 2019-02-28 DIAGNOSIS — R49.0 HOARSENESS OF VOICE: ICD-10-CM

## 2019-02-28 DIAGNOSIS — G25.2 KINETIC TREMOR: ICD-10-CM

## 2019-02-28 DIAGNOSIS — R39.15 URINARY URGENCY: ICD-10-CM

## 2019-02-28 DIAGNOSIS — R29.898 LEFT HAND WEAKNESS: ICD-10-CM

## 2019-02-28 DIAGNOSIS — H53.8 BLURRY VISION, BILATERAL: ICD-10-CM

## 2019-02-28 DIAGNOSIS — R49.0 HOARSENESS OF VOICE: Primary | ICD-10-CM

## 2019-02-28 PROCEDURE — 83519 RIA NONANTIBODY: CPT

## 2019-02-28 PROCEDURE — 36415 COLL VENOUS BLD VENIPUNCTURE: CPT

## 2019-02-28 PROCEDURE — 99245 OFF/OP CONSLTJ NEW/EST HI 55: CPT | Performed by: OTHER

## 2019-02-28 PROCEDURE — 84238 ASSAY NONENDOCRINE RECEPTOR: CPT

## 2019-02-28 RX ORDER — GABAPENTIN 100 MG/1
CAPSULE ORAL
Qty: 270 CAPSULE | Refills: 0 | Status: SHIPPED | OUTPATIENT
Start: 2019-02-28 | End: 2019-03-27

## 2019-02-28 NOTE — PROGRESS NOTES
[facility]   Neurology- INITIAL CLINIC VISIT  2019, 1:07 PM     Gayla James Patient Status:  No patient class for patient encounter    1965 MRN [id number]   Location [address] [facility] Anand Shea fingers, and her right hand locks up. Tremors are usually when holding a cup or stirring. She often (last 6 months) notices a hoarse voice intermittently for past few months. It can occur at the end of the day, and last 1-2 days.  Sometimes she has general TVA)   • COLONOSCOPY, POSSIBLE BIOPSY, POSSIBLE POLYPECTOMY 72295 N/A 1/31/2017    Performed by Fredy Mckeon MD at [address] [place] [address]   • ESOPHAGOGASTRODUODENOSCOPY, POSSIBLE BIOPSY, POSSIBLE POLYPECTOMY 50049 N/A 1/31/2017    Performed by Skylar Isidro Extraocular movements were full. Reported \"haze\" that was delayed, with upward gaze, not true double image. There was no face, jaw, palate or tongue weakness or atrophy. Hearing was grossly intact.  Shoulder shrug was normal. Neck flexion 4/5  Motor exam EMG  Kinetic right greater than left tremor- consistent with essential tremor. Prior history of adverse effects with propranolol. Although not first line agent, trial of gabapentin recommended for symptomatic treatment of this and possible CTS.     Requeste

## 2019-03-01 DIAGNOSIS — G96.9 TREMOR DUE TO DISORDER OF CNS: Primary | ICD-10-CM

## 2019-03-01 DIAGNOSIS — R25.1 TREMOR DUE TO DISORDER OF CNS: Primary | ICD-10-CM

## 2019-03-04 LAB
ACETYLCHOLINE BINDING AB: 0 NMOL/L
ACETYLCHOLINE BLOCKING AB: 0 %

## 2019-03-15 ENCOUNTER — PROCEDURE VISIT (OUTPATIENT)
Dept: NEUROLOGY | Facility: CLINIC | Age: 54
End: 2019-03-15

## 2019-03-15 DIAGNOSIS — R29.898 RIGHT HAND WEAKNESS: Primary | ICD-10-CM

## 2019-03-15 PROCEDURE — 95885 MUSC TST DONE W/NERV TST LIM: CPT | Performed by: OTHER

## 2019-03-15 PROCEDURE — 95912 NRV CNDJ TEST 11-12 STUDIES: CPT | Performed by: OTHER

## 2019-03-15 PROCEDURE — 95937 NEUROMUSCULAR JUNCTION TEST: CPT | Performed by: OTHER

## 2019-03-15 NOTE — PROCEDURES
[facility]  Nerve Conduction & Electromyography Report            Patient: Jennifer Merritt  Patient ID: [id number]  YOB: 1965  Age: 49 Years 1 Months  Referring MCHRIS: Priscilla Thornton M.D.: Sia Zamorano, B.Elbow ADM 6.46  11.6  94.5 ?115 100     B. Elbow - Ulnar Wrist 24 62 ?49         A. Elbow ADM 8.07  11.0  95.5 ?115 100     A. Elbow -    B. Elbow 11 68 ?49   L LEG      Tibial Ankle AH 4.58 ?6.00 20.0 ?3.0 100   ?50.00 Tibial Ankle - AH 8        Peron 2. 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  3. There is no evidence of a neuromuscular junction disorder. 4. There is no evidence of a polyneuropathy or diffuse polyradiculopathy.       Becky Lucas, DO  Neur

## 2019-03-18 ENCOUNTER — OFFICE VISIT (OUTPATIENT)
Dept: FAMILY MEDICINE CLINIC | Facility: CLINIC | Age: 54
End: 2019-03-18

## 2019-03-18 VITALS
TEMPERATURE: 99 F | BODY MASS INDEX: 30 KG/M2 | HEART RATE: 72 BPM | RESPIRATION RATE: 12 BRPM | SYSTOLIC BLOOD PRESSURE: 118 MMHG | WEIGHT: 199.81 LBS | DIASTOLIC BLOOD PRESSURE: 82 MMHG

## 2019-03-18 DIAGNOSIS — G56.01 CARPAL TUNNEL SYNDROME ON RIGHT: ICD-10-CM

## 2019-03-18 DIAGNOSIS — M25.531 WRIST PAIN, RIGHT: Primary | ICD-10-CM

## 2019-03-18 PROCEDURE — 99213 OFFICE O/P EST LOW 20 MIN: CPT | Performed by: FAMILY MEDICINE

## 2019-03-18 NOTE — PROGRESS NOTES
Haresh Bellamy is a 49year old female.   HPI:   Naomi Wyman is here for discussion of persistent right wrist and hand pain with paresthesia and weakness of her  strength, she is on a computer all day and has noted more issues with her right hand strengt Right arm, Patient Position: Sitting, Cuff Size: large)   Pulse 72   Temp 98.6 °F (37 °C) (Temporal)   Resp 12   Wt 199 lb 12.8 oz   BMI 30.38 kg/m²   GENERAL: well developed, well nourished,in no apparent distress  SKIN: no rashes,no suspicious lesions  H

## 2019-03-18 NOTE — PROGRESS NOTES
Location of pain: right wrist  Pain score:  6/10, 8/10 when trying to use it  Pain will sometimes radiate up to elbow  Putting pressure will hurt more. Types all day. Sometimes cannot  how she is typing and will miss a key.   Duration of pain:  This b

## 2019-03-27 ENCOUNTER — HOSPITAL ENCOUNTER (OUTPATIENT)
Dept: MRI IMAGING | Age: 54
Discharge: HOME | End: 2019-03-27
Attending: Other
Payer: COMMERCIAL

## 2019-03-27 DIAGNOSIS — R39.15 URINARY URGENCY: ICD-10-CM

## 2019-03-27 DIAGNOSIS — R49.0 HOARSENESS OF VOICE: Primary | ICD-10-CM

## 2019-03-27 DIAGNOSIS — R29.898 RIGHT HAND WEAKNESS: ICD-10-CM

## 2019-03-27 DIAGNOSIS — R29.898 LEFT HAND WEAKNESS: ICD-10-CM

## 2019-03-27 LAB — CREAT SERPL-MCNC: 0.9 MG/DL (ref 0.55–1.02)

## 2019-03-27 PROCEDURE — 82565 ASSAY OF CREATININE: CPT

## 2019-03-27 PROCEDURE — 72141 MRI NECK SPINE W/O DYE: CPT | Performed by: OTHER

## 2019-03-27 PROCEDURE — 70553 MRI BRAIN STEM W/O & W/DYE: CPT | Performed by: OTHER

## 2019-03-27 PROCEDURE — A9575 INJ GADOTERATE MEGLUMI 0.1ML: HCPCS | Performed by: OTHER

## 2019-03-27 RX ORDER — GABAPENTIN 100 MG/1
CAPSULE ORAL
Qty: 270 CAPSULE | Refills: 0 | Status: SHIPPED | OUTPATIENT
Start: 2019-03-27 | End: 2019-04-25

## 2019-04-15 ENCOUNTER — OFFICE VISIT (OUTPATIENT)
Dept: NEUROLOGY | Facility: CLINIC | Age: 54
End: 2019-04-15

## 2019-04-15 VITALS
SYSTOLIC BLOOD PRESSURE: 128 MMHG | WEIGHT: 196 LBS | HEART RATE: 74 BPM | BODY MASS INDEX: 30 KG/M2 | DIASTOLIC BLOOD PRESSURE: 72 MMHG | RESPIRATION RATE: 18 BRPM

## 2019-04-15 DIAGNOSIS — M47.22 OSTEOARTHRITIS OF SPINE WITH RADICULOPATHY, CERVICAL REGION: ICD-10-CM

## 2019-04-15 DIAGNOSIS — G25.0 ESSENTIAL TREMOR: ICD-10-CM

## 2019-04-15 DIAGNOSIS — G56.03 BILATERAL CARPAL TUNNEL SYNDROME: Primary | ICD-10-CM

## 2019-04-15 PROCEDURE — 99214 OFFICE O/P EST MOD 30 MIN: CPT | Performed by: OTHER

## 2019-04-15 RX ORDER — GABAPENTIN 300 MG/1
CAPSULE ORAL
Qty: 120 CAPSULE | Refills: 0 | Status: SHIPPED | OUTPATIENT
Start: 2019-04-15 | End: 2019-05-22

## 2019-04-15 NOTE — PROGRESS NOTES
[facility]   Neurology-    Oneyda Motley Patient Status:  No patient class for patient encounter    1965 MRN [id number]   Location [address] [place] [address], [address], [address] [facility] PCP Marli Pino DO              HPI: are usually when holding a cup or stirring. She often (last 6 months) notices a hoarse voice intermittently for past few months. It can occur at the end of the day, and last 1-2 days. Sometimes she has general bilateral blurry vision.  She has occasionally stenosis. There is slight retrolisthesis of C5 on C6.  2. Small central and left of midline C7-T1 disc protrusion without nerve root deformity.     MRI lumbar spine 2016- moderate L4-L5 spondylosis    Reportedly LP in 2009- negative for MS findings      Pas Hives/Urticaria, Pruritus    MEDICATIONS:    Current Outpatient Medications:   •  gabapentin 300 MG Oral Cap, Take 2 caps in am, 1 cap midday, 1 cap at night, Disp: 120 capsule, Rfl: 0  •  gabapentin 100 MG Oral Cap, TAKE 3 CAPSULES THREE TIMES DAILY, Disp normal coordination. Finger-nose-finger intact. Gait was narrow and stable, was able to walk on heels, toes and tandem without any difficulty. ASSESSMENT/ACTIVE PROBLEM LIST:   1. Bilateral carpal tunnel syndrome    2. Essential tremor    3.  Martha Runboyd 1001 Fabio Silva DO  Neuromuscular and General Neurology  [facility]

## 2019-04-15 NOTE — PROGRESS NOTES
The patient is here for a follow-up for right hand weakness. The patient states the weakness has gotten worse since her last office visit. The patient is having trouble with word finding.

## 2019-04-16 ENCOUNTER — TELEPHONE (OUTPATIENT)
Dept: FAMILY MEDICINE CLINIC | Facility: CLINIC | Age: 54
End: 2019-04-16

## 2019-04-22 PROBLEM — G56.01 CARPAL TUNNEL SYNDROME ON RIGHT: Status: ACTIVE | Noted: 2019-04-22

## 2019-04-24 ENCOUNTER — TELEPHONE (OUTPATIENT)
Dept: FAMILY MEDICINE CLINIC | Facility: CLINIC | Age: 54
End: 2019-04-24

## 2019-04-24 DIAGNOSIS — Z12.39 SCREENING FOR MALIGNANT NEOPLASM OF BREAST: Primary | ICD-10-CM

## 2019-04-25 DIAGNOSIS — R49.0 HOARSENESS OF VOICE: ICD-10-CM

## 2019-04-25 RX ORDER — GABAPENTIN 100 MG/1
CAPSULE ORAL
Qty: 270 CAPSULE | Refills: 0 | Status: SHIPPED | OUTPATIENT
Start: 2019-04-25 | End: 2019-05-25

## 2019-04-25 NOTE — TELEPHONE ENCOUNTER
Medication: GABAPENTIN 100 MG Oral Cap    Date of last refill: 03/27/19 (#270/0)  [place]    Last office visit: 4/15/2019  Due back to clinic per last office note:  Around 06/24/19  Date next office visit scheduled:

## 2019-05-22 DIAGNOSIS — G56.03 BILATERAL CARPAL TUNNEL SYNDROME: Primary | ICD-10-CM

## 2019-05-22 NOTE — TELEPHONE ENCOUNTER
Medication: GABAPENTIN 300 MG Oral Cap    Date of last refill: 04/15/19 (#120/0)  [place]    Last office visit: 4/15/2019  Due back to clinic per last office note:  Around 06/24/19  Date next office visit scheduled:

## 2019-05-23 RX ORDER — GABAPENTIN 300 MG/1
CAPSULE ORAL
Qty: 120 CAPSULE | Refills: 0 | Status: SHIPPED | OUTPATIENT
Start: 2019-05-23 | End: 2019-07-03

## 2019-05-24 ENCOUNTER — TELEPHONE (OUTPATIENT)
Dept: FAMILY MEDICINE CLINIC | Facility: CLINIC | Age: 54
End: 2019-05-24

## 2019-05-24 NOTE — TELEPHONE ENCOUNTER
Letter mailed to patient reminding her she has outstanding orders.     Lab Frequency Next Occurrence   BRANDON SCREENING BILAT (CPT=77067) Once 04/24/2019

## 2019-05-25 DIAGNOSIS — R49.0 HOARSENESS OF VOICE: ICD-10-CM

## 2019-05-28 NOTE — TELEPHONE ENCOUNTER
Medication: gabapentin 100 MG Oral Cap    Date of last refill: 04/25/2019 (#270/0)  [place]     Last office visit: 4/15/2019  Due back to clinic per last office note: 6 weeks  Date next office visit scheduled:    Future

## 2019-05-30 RX ORDER — GABAPENTIN 100 MG/1
CAPSULE ORAL
Qty: 270 CAPSULE | Refills: 0 | Status: SHIPPED | OUTPATIENT
Start: 2019-05-30 | End: 2019-06-17 | Stop reason: DRUGHIGH

## 2019-06-13 DIAGNOSIS — M54.12 CERVICAL RADICULOPATHY: Primary | ICD-10-CM

## 2019-06-17 ENCOUNTER — OFFICE VISIT (OUTPATIENT)
Dept: NEUROLOGY | Facility: CLINIC | Age: 54
End: 2019-06-17

## 2019-06-17 VITALS
DIASTOLIC BLOOD PRESSURE: 72 MMHG | BODY MASS INDEX: 28 KG/M2 | WEIGHT: 186 LBS | RESPIRATION RATE: 18 BRPM | SYSTOLIC BLOOD PRESSURE: 126 MMHG | HEART RATE: 72 BPM

## 2019-06-17 DIAGNOSIS — G25.0 ESSENTIAL TREMOR: ICD-10-CM

## 2019-06-17 DIAGNOSIS — G56.03 BILATERAL CARPAL TUNNEL SYNDROME: Primary | ICD-10-CM

## 2019-06-17 PROCEDURE — 99213 OFFICE O/P EST LOW 20 MIN: CPT | Performed by: OTHER

## 2019-06-17 NOTE — PROGRESS NOTES
[facility]   Neurology-    [facility] Jenny Patient Status:  No patient class for patient encounter    1965 MRN [id number]   Location [address] [place] [address], [address] [place] [address], [address] [place] [address] PCP Jennifer Figueredo DO              HPI: are usually when holding a cup or stirring. She often (last 6 months) notices a hoarse voice intermittently for past few months. It can occur at the end of the day, and last 1-2 days. Sometimes she has general bilateral blurry vision.  She has occasionally protrusion. There is foraminal narrowing right much greater than left. There is no central canal stenosis. There is slight retrolisthesis of C5 on C6.  2. Small central and left of midline C7-T1 disc protrusion without nerve root deformity.     MRI lumbar s drugs.     Allergies:    Latex                       MEDICATIONS:    Current Outpatient Medications:   •  gabapentin 300 MG Oral Cap, TAKE 2 CAPS BY MOUTH EVERY MORNING, 1 CAPSULE MIDDAY, AND 1 CAPSULE AT NIGHT, Disp: 120 capsule, Rfl: 0  •  TRIAMTERENE-HCT was narrow and stable, was able to walk on heels, toes and tandem without any difficulty.        ASSESSMENT/ACTIVE PROBLEM LIST:   Bilateral carpal tunnel syndrome  (primary encounter diagnosis)  Essential tremor      Discussion/Plan:  Bilateral CTS with ri

## 2019-07-03 DIAGNOSIS — G56.03 BILATERAL CARPAL TUNNEL SYNDROME: ICD-10-CM

## 2019-07-03 NOTE — TELEPHONE ENCOUNTER
Medication: GABAPENTIN 300 MG Oral Cap    Date of last refill: 05/23/19 (#120/0)  [place]    Last office visit: 6/17/2019  Due back to clinic per last office note:  Around 11/17/19  Date next office visit scheduled:

## 2019-07-04 RX ORDER — GABAPENTIN 300 MG/1
CAPSULE ORAL
Qty: 120 CAPSULE | Refills: 2 | Status: SHIPPED | OUTPATIENT
Start: 2019-07-04 | End: 2019-11-07

## 2019-07-23 ENCOUNTER — PATIENT MESSAGE (OUTPATIENT)
Dept: FAMILY MEDICINE CLINIC | Facility: CLINIC | Age: 54
End: 2019-07-23

## 2019-07-23 DIAGNOSIS — F32.A DEPRESSION, UNSPECIFIED DEPRESSION TYPE: Primary | ICD-10-CM

## 2019-07-23 NOTE — TELEPHONE ENCOUNTER
From: Lawyer Lopez  To: Milton Gonzalez DO  Sent: 7/23/2019 8:13 AM CDT  Subject: Referral Request    Good morning. I was wondering if there would be a therapist that you could refer me to.  I know [facility] keeps it limited but if I may be seeing someone

## 2019-08-13 ENCOUNTER — OFFICE VISIT (OUTPATIENT)
Dept: FAMILY MEDICINE CLINIC | Facility: CLINIC | Age: 54
End: 2019-08-13

## 2019-08-13 VITALS
DIASTOLIC BLOOD PRESSURE: 70 MMHG | HEIGHT: 68 IN | WEIGHT: 182.81 LBS | SYSTOLIC BLOOD PRESSURE: 110 MMHG | RESPIRATION RATE: 14 BRPM | BODY MASS INDEX: 27.71 KG/M2 | HEART RATE: 68 BPM | TEMPERATURE: 100 F

## 2019-08-13 DIAGNOSIS — Z00.00 ROUTINE HEALTH MAINTENANCE: Primary | ICD-10-CM

## 2019-08-13 DIAGNOSIS — Z87.898 HISTORY OF ABNORMAL MAMMOGRAM: ICD-10-CM

## 2019-08-13 PROCEDURE — 88175 CYTOPATH C/V AUTO FLUID REDO: CPT | Performed by: FAMILY MEDICINE

## 2019-08-13 PROCEDURE — 99396 PREV VISIT EST AGE 40-64: CPT | Performed by: FAMILY MEDICINE

## 2019-08-13 PROCEDURE — 87624 HPV HI-RISK TYP POOLED RSLT: CPT | Performed by: FAMILY MEDICINE

## 2019-08-13 RX ORDER — TRIAMTERENE AND HYDROCHLOROTHIAZIDE 75; 50 MG/1; MG/1
0.5 TABLET ORAL DAILY
Refills: 5 | COMMUNITY
Start: 2019-07-18 | End: 2020-06-22

## 2019-08-13 NOTE — PROGRESS NOTES
HPI:   Alek Keller is a 49year old female who presents for a complete physical exam. Symptoms: denies discharge, itching, burning or dysuria. Patient complains of has seen neurology for her MS.   She has some LE edema when it gets warm out or she wal Unspecified essential hypertension       Past Surgical History:   Procedure Laterality Date   • BIOPSY OF BREAST, INCISIONAL     • CERVICAL EPIDURAL STEROID INJECTION N/A 6/29/2016    Performed by Whitney Cee MD at [facility] MAIN OR   • Oscar headaches  PSYCHE: denies depression or anxiety  HEMATOLOGIC: denies hx of anemia  ENDOCRINE: denies thyroid history  ALL/ASTHMA: denies hx of allergy or asthma    EXAM:   /70 (BP Location: Right arm, Patient Position: Sitting, Cuff Size: large)   Pu Metabolic Panel (14) [E]      Lipid Panel [E]      TSH and Free T4 [E]      ThinPrep PAP with HPV Reflex Request      Meds & Refills for this Visit:  Requested Prescriptions      No prescriptions requested or ordered in this encounter       Imaging & Consu

## 2019-08-17 ENCOUNTER — HOSPITAL ENCOUNTER (OUTPATIENT)
Age: 54
Discharge: HOME | End: 2019-08-17
Payer: COMMERCIAL

## 2019-08-17 VITALS
SYSTOLIC BLOOD PRESSURE: 127 MMHG | RESPIRATION RATE: 16 BRPM | WEIGHT: 178 LBS | TEMPERATURE: 97 F | OXYGEN SATURATION: 99 % | DIASTOLIC BLOOD PRESSURE: 75 MMHG | BODY MASS INDEX: 27 KG/M2 | HEART RATE: 55 BPM

## 2019-08-17 DIAGNOSIS — S39.012A STRAIN OF LUMBAR PARASPINAL MUSCLE, INITIAL ENCOUNTER: Primary | ICD-10-CM

## 2019-08-17 LAB
POCT BILIRUBIN URINE: NEGATIVE
POCT BLOOD URINE: NEGATIVE
POCT GLUCOSE URINE: NEGATIVE MG/DL
POCT KETONE URINE: NEGATIVE MG/DL
POCT LEUKOCYTE ESTERASE URINE: NEGATIVE
POCT NITRITE URINE: NEGATIVE
POCT PH URINE: 8.5 (ref 5–8)
POCT PROTEIN URINE: NEGATIVE MG/DL
POCT SPECIFIC GRAVITY URINE: 1.01
POCT URINE COLOR: YELLOW
POCT UROBILINOGEN URINE: 0.2 MG/DL

## 2019-08-17 PROCEDURE — 99214 OFFICE O/P EST MOD 30 MIN: CPT

## 2019-08-17 PROCEDURE — 81002 URINALYSIS NONAUTO W/O SCOPE: CPT | Performed by: PHYSICIAN ASSISTANT

## 2019-08-17 PROCEDURE — 96372 THER/PROPH/DIAG INJ SC/IM: CPT

## 2019-08-17 RX ORDER — COVID-19 ANTIGEN TEST
220 KIT MISCELLANEOUS
COMMUNITY
End: 2019-12-12 | Stop reason: ALTCHOICE

## 2019-08-17 RX ORDER — KETOROLAC TROMETHAMINE 30 MG/ML
60 INJECTION, SOLUTION INTRAMUSCULAR; INTRAVENOUS ONCE
Status: COMPLETED | OUTPATIENT
Start: 2019-08-17 | End: 2019-08-17

## 2019-08-17 RX ORDER — CYCLOBENZAPRINE HCL 10 MG
10 TABLET ORAL 3 TIMES DAILY PRN
Qty: 20 TABLET | Refills: 0 | Status: SHIPPED | OUTPATIENT
Start: 2019-08-17 | End: 2019-08-24

## 2019-08-17 RX ORDER — NAPROXEN 500 MG/1
500 TABLET ORAL 2 TIMES DAILY PRN
Qty: 20 TABLET | Refills: 0 | Status: SHIPPED | OUTPATIENT
Start: 2019-08-17 | End: 2019-08-24

## 2019-08-17 NOTE — ED PROVIDER NOTES
Patient Seen in: [address] [facility]    History   Patient presents with:  Back Pain (musculoskeletal)    Stated Complaint: back pain    REI Blair  is a 27-year-old female who works as an  and was at work lifting multiple nuha Performed by Marcela Courtney MD at [address] [place] [address]   • ESOPHAGOGASTRODUODENOSCOPY, POSSIBLE BIOPSY, POSSIBLE POLYPECTOMY 23262 N/A 1/31/2017    Performed by Marcela Courtney MD at [address] [place] [address] - [facility]  1/17    esophageal nodule (papilloma) Bilaterally, 5/5 bilateral lower extremity motor strength exam, SILT, + 2/4 bilateral patella and achilles reflexes                Lymphatic:  No adenopathy  Skin/Hair/Nails:  Skin warm, dry and intact, no rashes or abnormal dyspigmentation  Neurologic:  A 1 tablet (10 mg total) by mouth 3 (three) times daily as needed for Muscle spasms. Qty: 20 tablet Refills: 0    naproxen 500 MG Oral Tab  Take 1 tablet (500 mg total) by mouth 2 (two) times daily as needed.   Qty: 20 tablet Refills: 0        I have given t

## 2019-08-17 NOTE — ED INITIAL ASSESSMENT (HPI)
Pt sts moved boxes yesterday. Pain began to lower back last night- not relieved by Ibuprofen. This morning pain worse- now radiating around zeus hips- took Aleve, hot shower, ice packs- no relief.  Denies numbness/tingling to zeus legs, or difficulty urinatin

## 2019-08-18 LAB — HPV I/H RISK 1 DNA SPEC QL NAA+PROBE: NEGATIVE

## 2019-08-19 ENCOUNTER — TELEPHONE (OUTPATIENT)
Dept: FAMILY MEDICINE CLINIC | Facility: CLINIC | Age: 54
End: 2019-08-19

## 2019-08-19 LAB
LAST PAP RESULT: NORMAL
PAP HISTORY (OTHER THAN LAST PAP): NORMAL

## 2019-08-19 NOTE — TELEPHONE ENCOUNTER
----- Message from Pratibha Mar DO sent at 8/19/2019  8:24 AM CDT -----  Ca notify Case Michaud her PAP was negative for any cellular changes and  For HPV she's good for 3 years

## 2019-08-24 ENCOUNTER — NURSE ONLY (OUTPATIENT)
Dept: FAMILY MEDICINE CLINIC | Facility: CLINIC | Age: 54
End: 2019-08-24

## 2019-08-24 DIAGNOSIS — Z00.00 ROUTINE HEALTH MAINTENANCE: ICD-10-CM

## 2019-08-24 LAB
ALBUMIN SERPL-MCNC: 3.8 G/DL (ref 3.4–5)
ALBUMIN/GLOB SERPL: 1.2 {RATIO} (ref 1–2)
ALP LIVER SERPL-CCNC: 88 U/L (ref 41–108)
ALT SERPL-CCNC: 24 U/L (ref 13–56)
ANION GAP SERPL CALC-SCNC: 3 MMOL/L (ref 0–18)
AST SERPL-CCNC: 22 U/L (ref 15–37)
BASOPHILS # BLD AUTO: 0.03 X10(3) UL (ref 0–0.2)
BASOPHILS NFR BLD AUTO: 0.5 %
BILIRUB SERPL-MCNC: 0.8 MG/DL (ref 0.1–2)
BUN BLD-MCNC: 20 MG/DL (ref 7–18)
BUN/CREAT SERPL: 20.6 (ref 10–20)
CALCIUM BLD-MCNC: 9.2 MG/DL (ref 8.5–10.1)
CHLORIDE SERPL-SCNC: 107 MMOL/L (ref 98–112)
CHOLEST SMN-MCNC: 192 MG/DL (ref ?–200)
CO2 SERPL-SCNC: 31 MMOL/L (ref 21–32)
CREAT BLD-MCNC: 0.97 MG/DL (ref 0.55–1.02)
DEPRECATED RDW RBC AUTO: 39.7 FL (ref 35.1–46.3)
EOSINOPHIL # BLD AUTO: 0.1 X10(3) UL (ref 0–0.7)
EOSINOPHIL NFR BLD AUTO: 1.6 %
ERYTHROCYTE [DISTWIDTH] IN BLOOD BY AUTOMATED COUNT: 12.6 % (ref 11–15)
GLOBULIN PLAS-MCNC: 3.1 G/DL (ref 2.8–4.4)
GLUCOSE BLD-MCNC: 88 MG/DL (ref 70–99)
HCT VFR BLD AUTO: 45.1 % (ref 35–48)
HDLC SERPL-MCNC: 57 MG/DL (ref 40–59)
HGB BLD-MCNC: 15.1 G/DL (ref 12–16)
IMM GRANULOCYTES # BLD AUTO: 0.02 X10(3) UL (ref 0–1)
IMM GRANULOCYTES NFR BLD: 0.3 %
LDLC SERPL CALC-MCNC: 115 MG/DL (ref ?–100)
LYMPHOCYTES # BLD AUTO: 1.97 X10(3) UL (ref 1–4)
LYMPHOCYTES NFR BLD AUTO: 30.8 %
M PROTEIN MFR SERPL ELPH: 6.9 G/DL (ref 6.4–8.2)
MCH RBC QN AUTO: 29.2 PG (ref 26–34)
MCHC RBC AUTO-ENTMCNC: 33.5 G/DL (ref 31–37)
MCV RBC AUTO: 87.1 FL (ref 80–100)
MONOCYTES # BLD AUTO: 0.52 X10(3) UL (ref 0.1–1)
MONOCYTES NFR BLD AUTO: 8.1 %
NEUTROPHILS # BLD AUTO: 3.75 X10 (3) UL (ref 1.5–7.7)
NEUTROPHILS # BLD AUTO: 3.75 X10(3) UL (ref 1.5–7.7)
NEUTROPHILS NFR BLD AUTO: 58.7 %
NONHDLC SERPL-MCNC: 135 MG/DL (ref ?–130)
OSMOLALITY SERPL CALC.SUM OF ELEC: 294 MOSM/KG (ref 275–295)
PLATELET # BLD AUTO: 250 10(3)UL (ref 150–450)
POTASSIUM SERPL-SCNC: 3.8 MMOL/L (ref 3.5–5.1)
RBC # BLD AUTO: 5.18 X10(6)UL (ref 3.8–5.3)
SODIUM SERPL-SCNC: 141 MMOL/L (ref 136–145)
T4 FREE SERPL-MCNC: 0.9 NG/DL (ref 0.8–1.7)
TRIGL SERPL-MCNC: 100 MG/DL (ref 30–149)
TSI SER-ACNC: 3.21 MIU/ML (ref 0.36–3.74)
VLDLC SERPL CALC-MCNC: 20 MG/DL (ref 0–30)
WBC # BLD AUTO: 6.4 X10(3) UL (ref 4–11)

## 2019-08-24 PROCEDURE — 80061 LIPID PANEL: CPT | Performed by: FAMILY MEDICINE

## 2019-08-24 PROCEDURE — 80053 COMPREHEN METABOLIC PANEL: CPT | Performed by: FAMILY MEDICINE

## 2019-08-24 PROCEDURE — 85025 COMPLETE CBC W/AUTO DIFF WBC: CPT | Performed by: FAMILY MEDICINE

## 2019-08-24 PROCEDURE — 84439 ASSAY OF FREE THYROXINE: CPT | Performed by: FAMILY MEDICINE

## 2019-08-24 PROCEDURE — 84443 ASSAY THYROID STIM HORMONE: CPT | Performed by: FAMILY MEDICINE

## 2019-08-24 NOTE — PROGRESS NOTES
Mint and lav  tubes drawn from left arm with 21g butterfly needle x1 after 1 unsuccessful attempt in the right arm. Pt lowell well.  Pt left the clinic in stable condition

## 2019-08-26 ENCOUNTER — TELEPHONE (OUTPATIENT)
Dept: FAMILY MEDICINE CLINIC | Facility: CLINIC | Age: 54
End: 2019-08-26

## 2019-08-26 DIAGNOSIS — E78.5 HYPERLIPIDEMIA, UNSPECIFIED HYPERLIPIDEMIA TYPE: Primary | ICD-10-CM

## 2019-08-26 NOTE — TELEPHONE ENCOUNTER
Patient has been notified, verbalized understanding of information. Denies further questions. Lipid has been ordered for 4 months. Recall placed for 4 months.

## 2019-08-26 NOTE — TELEPHONE ENCOUNTER
----- Message from Piero Philippe DO sent at 8/25/2019  8:09 AM CDT -----  Can notify Liudmila Villafana her labs look pretty good over her BS kidney liver blood count and thyroid all look good. Her cholesterol was not bad but her LDL was a little high.  Nothing diet a

## 2019-09-13 ENCOUNTER — HOSPITAL ENCOUNTER (OUTPATIENT)
Dept: ULTRASOUND IMAGING | Age: 54
Discharge: HOME | End: 2019-09-13
Attending: FAMILY MEDICINE
Payer: COMMERCIAL

## 2019-09-13 ENCOUNTER — HOSPITAL ENCOUNTER (OUTPATIENT)
Dept: MAMMOGRAPHY | Age: 54
Discharge: HOME | End: 2019-09-13
Attending: FAMILY MEDICINE
Payer: COMMERCIAL

## 2019-09-13 DIAGNOSIS — Z87.898 HISTORY OF ABNORMAL MAMMOGRAM: ICD-10-CM

## 2019-09-13 PROCEDURE — 76642 ULTRASOUND BREAST LIMITED: CPT | Performed by: FAMILY MEDICINE

## 2019-09-13 PROCEDURE — 77066 DX MAMMO INCL CAD BI: CPT | Performed by: FAMILY MEDICINE

## 2019-09-13 PROCEDURE — 77062 BREAST TOMOSYNTHESIS BI: CPT | Performed by: FAMILY MEDICINE

## 2019-11-06 ENCOUNTER — TELEPHONE (OUTPATIENT)
Dept: FAMILY MEDICINE CLINIC | Facility: CLINIC | Age: 54
End: 2019-11-06

## 2019-11-07 DIAGNOSIS — G25.0 ESSENTIAL TREMOR: ICD-10-CM

## 2019-11-07 DIAGNOSIS — R20.2 PARESTHESIA AND PAIN OF BOTH UPPER EXTREMITIES: Primary | ICD-10-CM

## 2019-11-07 DIAGNOSIS — G56.03 BILATERAL CARPAL TUNNEL SYNDROME: ICD-10-CM

## 2019-11-07 DIAGNOSIS — M79.601 PARESTHESIA AND PAIN OF BOTH UPPER EXTREMITIES: Primary | ICD-10-CM

## 2019-11-07 DIAGNOSIS — M79.602 PARESTHESIA AND PAIN OF BOTH UPPER EXTREMITIES: Primary | ICD-10-CM

## 2019-11-07 RX ORDER — GABAPENTIN 300 MG/1
CAPSULE ORAL
Qty: 120 CAPSULE | Refills: 0 | Status: SHIPPED | OUTPATIENT
Start: 2019-11-07 | End: 2019-12-18

## 2019-11-07 NOTE — TELEPHONE ENCOUNTER
Sent ResQUt message to pt to remind her that she is due for an appointment this month and to call the office to schedule to prevent any future delays in refills.      Medication: GABAPENTIN 300 MG Oral Cap    Date of last refill: 7/4/19 (#120/2)  Date last

## 2019-12-12 ENCOUNTER — OFFICE VISIT (OUTPATIENT)
Dept: FAMILY MEDICINE CLINIC | Facility: CLINIC | Age: 54
End: 2019-12-12

## 2019-12-12 VITALS
HEART RATE: 64 BPM | TEMPERATURE: 98 F | WEIGHT: 189 LBS | RESPIRATION RATE: 16 BRPM | SYSTOLIC BLOOD PRESSURE: 110 MMHG | DIASTOLIC BLOOD PRESSURE: 78 MMHG | BODY MASS INDEX: 29 KG/M2

## 2019-12-12 DIAGNOSIS — G56.21 ULNAR NEUROPATHY OF RIGHT UPPER EXTREMITY: Primary | ICD-10-CM

## 2019-12-12 DIAGNOSIS — R68.82 DECREASED LIBIDO: ICD-10-CM

## 2019-12-12 DIAGNOSIS — R63.5 WEIGHT GAIN: ICD-10-CM

## 2019-12-12 PROCEDURE — 99214 OFFICE O/P EST MOD 30 MIN: CPT | Performed by: FAMILY MEDICINE

## 2019-12-12 NOTE — PROGRESS NOTES
Tamara Armstrong is a 49year old female. HPI:   Linda Klinefelter is here for discussion of  Right lateral elbow pain and 5th finger pain that radiates up into her elbow. she is right handed and is using a computer and a mouse all day long.  She has snot had any tr exertion  GI: denies abdominal pain and denies heartburn  NEURO: denies headaches, right hand and arm pain and ? paresthesia  ENDO: weight gain, lack of libido  EXAM:   /78 (BP Location: Left arm, Patient Position: Sitting, Cuff Size: large)   Pulse

## 2019-12-18 DIAGNOSIS — G56.03 BILATERAL CARPAL TUNNEL SYNDROME: ICD-10-CM

## 2019-12-18 RX ORDER — GABAPENTIN 300 MG/1
CAPSULE ORAL
Qty: 120 CAPSULE | Refills: 0 | Status: SHIPPED | OUTPATIENT
Start: 2019-12-18 | End: 2020-01-27

## 2019-12-28 ENCOUNTER — NURSE ONLY (OUTPATIENT)
Dept: FAMILY MEDICINE CLINIC | Facility: CLINIC | Age: 54
End: 2019-12-28

## 2019-12-28 DIAGNOSIS — R68.82 DECREASED LIBIDO: ICD-10-CM

## 2019-12-28 PROCEDURE — 84403 ASSAY OF TOTAL TESTOSTERONE: CPT | Performed by: FAMILY MEDICINE

## 2019-12-28 PROCEDURE — 84402 ASSAY OF FREE TESTOSTERONE: CPT | Performed by: FAMILY MEDICINE

## 2019-12-28 NOTE — PROGRESS NOTES
Gayla James presents today for nurse visit. Labs ordered by Dr Marvin Sibley.  1 gold drawn from right ac area with 1 attempt with straight needle. Patient tolerated well. Left office in stable condition.

## 2020-01-02 ENCOUNTER — TELEPHONE (OUTPATIENT)
Dept: FAMILY MEDICINE CLINIC | Facility: CLINIC | Age: 55
End: 2020-01-02

## 2020-01-02 LAB
SEX HORMONE BINDING GLOBULIN: 84 NMOL/L
TESTOSTERONE -MS, BIOAVAILAB: 6.2 NG/DL
TESTOSTERONE, -MS/MS: 25 NG/DL
TESTOSTERONE, FREE -MS/MS: 2.3 PG/ML

## 2020-01-02 NOTE — TELEPHONE ENCOUNTER
----- Message from Kalie Tomas DO sent at 1/2/2020  8:07 AM CST -----  Can notify Lashae Farrelling her testosterone level is fine. I think maybe some of her libido issues could be related to Job stress and demands, is she sleeping enough?  Also what is the quality

## 2020-01-02 NOTE — TELEPHONE ENCOUNTER
Pt was advised of results- verbalized understanding    Pt states that she has been trying to exercise. Pt states she does not get enough sleep. RN offered pt visit to come in and discuss with DS.   Pt states she will think about it and call us back

## 2020-01-07 ENCOUNTER — TELEPHONE (OUTPATIENT)
Dept: FAMILY MEDICINE CLINIC | Facility: CLINIC | Age: 55
End: 2020-01-07

## 2020-01-07 NOTE — TELEPHONE ENCOUNTER
Letter mailed to patient reminding her she is due for labs.     Lab Frequency Next Occurrence   LIPID PANEL Once 12/26/2019

## 2020-01-20 ENCOUNTER — OFFICE VISIT (OUTPATIENT)
Dept: NEUROLOGY | Facility: CLINIC | Age: 55
End: 2020-01-20

## 2020-01-20 VITALS
HEART RATE: 68 BPM | RESPIRATION RATE: 16 BRPM | WEIGHT: 180 LBS | DIASTOLIC BLOOD PRESSURE: 68 MMHG | BODY MASS INDEX: 27 KG/M2 | SYSTOLIC BLOOD PRESSURE: 108 MMHG

## 2020-01-20 DIAGNOSIS — G25.0 ESSENTIAL TREMOR: ICD-10-CM

## 2020-01-20 DIAGNOSIS — R47.89 WORD FINDING DIFFICULTY: ICD-10-CM

## 2020-01-20 DIAGNOSIS — G56.03 BILATERAL CARPAL TUNNEL SYNDROME: Primary | ICD-10-CM

## 2020-01-20 DIAGNOSIS — M79.641 RIGHT HAND PAIN: ICD-10-CM

## 2020-01-20 PROCEDURE — 99213 OFFICE O/P EST LOW 20 MIN: CPT | Performed by: OTHER

## 2020-01-20 NOTE — PROGRESS NOTES
[facility] [address]   Neurology-    Janelle Marking Patient Status:  No patient class for patient encounter    1965 MRN [id number]   Location [address] [place] [address], [address] [place] [address], [address] [place] [address] PCP Pratibha Mar DO              HPI: are usually when holding a cup or stirring. She often (last 6 months) notices a hoarse voice intermittently for past few months. It can occur at the end of the day, and last 1-2 days. Sometimes she has general bilateral blurry vision.  She has occasionally study.  2. 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  3. There is no evidence of a neuromuscular junction disorder. 4. There is no evidence of a polyneuropathy or diffuse polyradiculopathy.         MRI cervical 5 BRANDON LOCALIZATION WIRE 1 SITE RIGHT (CPT=19281) Right     BENIGN   Late 90's    • RADIATION LEFT Left     2011   • REMOVAL GALLBLADDER         Family History:  family history includes Breast Cancer (age of onset: 79) in her mother; Cancer in her paternal un were round and reacted to light. Extraocular movements were full. Reported \"haze\" that was delayed, with upward gaze, not true double image. There was no face, jaw, palate or tongue weakness or atrophy. Hearing was grossly intact.  Shoulder shrug was norm requested or ordered in this encounter          We discussed in depth regarding the diagnosis, prognosis, treatment. The patient was given ample opportunity to ask questions. All questions and concerns were addressed.        Sami Faria, DO  [facility]

## 2020-01-27 DIAGNOSIS — G56.03 BILATERAL CARPAL TUNNEL SYNDROME: ICD-10-CM

## 2020-01-27 RX ORDER — GABAPENTIN 300 MG/1
CAPSULE ORAL
Qty: 120 CAPSULE | Refills: 0 | Status: SHIPPED | OUTPATIENT
Start: 2020-01-27 | End: 2020-02-24

## 2020-01-27 NOTE — TELEPHONE ENCOUNTER
Medication: GABAPENTIN 300 MG     Date of last refill: 12/18/19 (#120/0)  [place]    Last office visit: 1/20/2020  Due back to clinic per last office note:  3 months  Date next office visit scheduled:    Future Appoin

## 2020-02-24 DIAGNOSIS — G56.03 BILATERAL CARPAL TUNNEL SYNDROME: ICD-10-CM

## 2020-02-24 RX ORDER — GABAPENTIN 300 MG/1
CAPSULE ORAL
Qty: 360 CAPSULE | Refills: 0 | Status: SHIPPED | OUTPATIENT
Start: 2020-02-24 | End: 2020-07-23

## 2020-02-24 NOTE — TELEPHONE ENCOUNTER
Medication: gabapentin 300 mg     Date of last refill: 1/27/20 (#120/0)  [place]    Last office visit: 1/20/2020  Due back to clinic per last office note:  3 months  Date next office visit scheduled:    Future Appoint

## 2020-04-05 ENCOUNTER — E-VISIT (OUTPATIENT)
Dept: FAMILY MEDICINE CLINIC | Facility: CLINIC | Age: 55
End: 2020-04-05

## 2020-04-05 DIAGNOSIS — R39.9 URINARY SYMPTOM OR SIGN: Primary | ICD-10-CM

## 2020-04-06 RX ORDER — NITROFURANTOIN 25; 75 MG/1; MG/1
100 CAPSULE ORAL 2 TIMES DAILY
Qty: 14 CAPSULE | Refills: 0 | Status: SHIPPED | OUTPATIENT
Start: 2020-04-06 | End: 2020-04-13

## 2020-04-06 NOTE — PROGRESS NOTES
Ines Victor is a 48year old female. HPI:   See answers to questions above.      Current Outpatient Medications   Medication Sig Dispense Refill   • Nitrofurantoin Monohyd Macro (MACROBID) 100 MG Oral Cap Take 1 capsule (100 mg total) by mouth 2 (two Family History   Problem Relation Age of Onset   • Cancer Mother 79        breast    • Breast Cancer Mother 79   • Cancer Paternal Uncle         stomach CA      Social History:  Social History    Tobacco Use      Smoking status: Former Smoker        Pack

## 2020-04-14 ENCOUNTER — TELEPHONE (OUTPATIENT)
Dept: NEUROLOGY | Facility: CLINIC | Age: 55
End: 2020-04-14

## 2020-04-14 NOTE — TELEPHONE ENCOUNTER
Pt returned call and accepted phone consultation at [address] [place] [address] on Thursday 4/16.

## 2020-04-14 NOTE — TELEPHONE ENCOUNTER
LM on VM asking if patient will switch her 4/27 clinic appt to a telephone visit. If calls back, please offer around 2pm tomorrow, or 9 am Thursday.  Thanks

## 2020-04-16 ENCOUNTER — VIRTUAL PHONE E/M (OUTPATIENT)
Dept: NEUROLOGY | Facility: CLINIC | Age: 55
End: 2020-04-16

## 2020-04-16 DIAGNOSIS — M79.641 RIGHT HAND PAIN: ICD-10-CM

## 2020-04-16 DIAGNOSIS — R47.89 WORD FINDING DIFFICULTY: ICD-10-CM

## 2020-04-16 DIAGNOSIS — G25.0 ESSENTIAL TREMOR: ICD-10-CM

## 2020-04-16 DIAGNOSIS — G56.03 BILATERAL CARPAL TUNNEL SYNDROME: Primary | ICD-10-CM

## 2020-04-16 PROCEDURE — 99213 OFFICE O/P EST LOW 20 MIN: CPT | Performed by: OTHER

## 2020-04-16 NOTE — PROGRESS NOTES
Virtual Telephone Check-In    Fran Tripp verbally consents to a Virtual/Telephone Check-In visit on 04/16/20. Patient understands and accepts financial responsibility for any deductible, co-insurance and/or co-pays associated with this service.

## 2020-06-02 ENCOUNTER — E-VISIT (OUTPATIENT)
Dept: FAMILY MEDICINE CLINIC | Facility: CLINIC | Age: 55
End: 2020-06-02

## 2020-06-02 DIAGNOSIS — R39.9 SYMPTOMS OF URINARY TRACT INFECTION: Primary | ICD-10-CM

## 2020-06-02 PROCEDURE — 99421 OL DIG E/M SVC 5-10 MIN: CPT | Performed by: NURSE PRACTITIONER

## 2020-06-02 RX ORDER — NITROFURANTOIN 25; 75 MG/1; MG/1
CAPSULE ORAL
Qty: 14 CAPSULE | Refills: 0 | Status: SHIPPED | OUTPATIENT
Start: 2020-06-02 | End: 2020-08-26

## 2020-06-02 NOTE — PROGRESS NOTES
Oneyda Motley is a 48year old female. HPI:   See answers to questions above.      Current Outpatient Medications   Medication Sig Dispense Refill   • Nitrofurantoin Monohyd Macro 100 MG Oral Cap Take one capsule 2 times daily for 7 days 14 capsule 0 Age of Onset   • Cancer Mother 79        breast    • Breast Cancer Mother 79   • Cancer Paternal Uncle         stomach CA      Social History:  Social History    Tobacco Use      Smoking status: Former Smoker        Packs/day: 1.00        Types: Cigarettes

## 2020-06-22 RX ORDER — TRIAMTERENE AND HYDROCHLOROTHIAZIDE 75; 50 MG/1; MG/1
TABLET ORAL
Qty: 45 TABLET | Refills: 0 | Status: SHIPPED | OUTPATIENT
Start: 2020-06-22 | End: 2020-09-22

## 2020-06-22 NOTE — TELEPHONE ENCOUNTER
LM for pt to call back- needs CPX with labs
Pt returned call    Future Appointments   Date Time Provider Irvin Dela Cruz   6/25/2020  4:20 PM Alison Ryder, [facility] EMG Chanotreve Libman       On TS- pt has a coworker who has tested positive. Has not been around coworker for the past 10 days.
She is due for labs and a CPX
73.9

## 2020-06-29 ENCOUNTER — OFFICE VISIT (OUTPATIENT)
Dept: FAMILY MEDICINE CLINIC | Facility: CLINIC | Age: 55
End: 2020-06-29

## 2020-06-29 ENCOUNTER — HOSPITAL ENCOUNTER (OUTPATIENT)
Dept: GENERAL RADIOLOGY | Age: 55
Discharge: HOME | End: 2020-06-29
Attending: FAMILY MEDICINE
Payer: COMMERCIAL

## 2020-06-29 VITALS
SYSTOLIC BLOOD PRESSURE: 122 MMHG | HEART RATE: 60 BPM | TEMPERATURE: 98 F | BODY MASS INDEX: 24.47 KG/M2 | WEIGHT: 169 LBS | DIASTOLIC BLOOD PRESSURE: 72 MMHG | HEIGHT: 69.5 IN | RESPIRATION RATE: 12 BRPM

## 2020-06-29 DIAGNOSIS — Z12.11 COLON CANCER SCREENING: ICD-10-CM

## 2020-06-29 DIAGNOSIS — M99.05 PELVIC SOMATIC DYSFUNCTION: ICD-10-CM

## 2020-06-29 DIAGNOSIS — Z00.00 ROUTINE HEALTH MAINTENANCE: Primary | ICD-10-CM

## 2020-06-29 DIAGNOSIS — R07.89 LEFT-SIDED CHEST WALL PAIN: ICD-10-CM

## 2020-06-29 DIAGNOSIS — M76.31 IT BAND SYNDROME, RIGHT: ICD-10-CM

## 2020-06-29 PROCEDURE — 99396 PREV VISIT EST AGE 40-64: CPT | Performed by: FAMILY MEDICINE

## 2020-06-29 PROCEDURE — 71046 X-RAY EXAM CHEST 2 VIEWS: CPT | Performed by: FAMILY MEDICINE

## 2020-06-29 NOTE — PROGRESS NOTES
HPI:   Venice Irving is a 48year old female who presents for a complete physical exam. Symptoms: denies discharge, itching, burning or dysuria. Patient complains of nothing. She gets some left sided chest pain, no cough, fever or chills.    Does not a Reported on 1/20/2020) 90 capsule 0      Past Medical History:   Diagnosis Date   • Allergic rhinitis, cause unspecified    • Allergic rhinitis, cause unspecified    • Breast cancer ([facility] [address]    • Carcinoma in situ of breast 1/3/11   • Carpal tunnel syndrome 3 frequency: Never      Comment: rare    Drug use: No    Occ:  for [employer] deal[employer]. : . Children:  .    Exercise: minimal.  Diet: watches minimally     REVIEW OF SYSTEMS:   GENERAL: feels well otherwise  SKIN: denies any unusual ski band syndrome, right  Pelvic somatic dysfunction  Colon cancer screening    Orders Placed This Encounter      CBC W Differential W Platelet [E]      Comp Metabolic Panel (14) [E]      Lipid Panel [E]      TSH and Free T4 [E]    Was given exercises to perfo

## 2020-07-23 DIAGNOSIS — G56.03 BILATERAL CARPAL TUNNEL SYNDROME: ICD-10-CM

## 2020-07-23 RX ORDER — GABAPENTIN 300 MG/1
CAPSULE ORAL
Qty: 360 CAPSULE | Refills: 0 | Status: SHIPPED | OUTPATIENT
Start: 2020-07-23 | End: 2020-10-16

## 2020-08-26 ENCOUNTER — OFFICE VISIT (OUTPATIENT)
Dept: FAMILY MEDICINE CLINIC | Facility: CLINIC | Age: 55
End: 2020-08-26

## 2020-08-26 VITALS
RESPIRATION RATE: 16 BRPM | HEIGHT: 68 IN | BODY MASS INDEX: 25.31 KG/M2 | HEART RATE: 64 BPM | WEIGHT: 167 LBS | SYSTOLIC BLOOD PRESSURE: 108 MMHG | OXYGEN SATURATION: 99 % | TEMPERATURE: 99 F | DIASTOLIC BLOOD PRESSURE: 68 MMHG

## 2020-08-26 DIAGNOSIS — Z00.00 ROUTINE HEALTH MAINTENANCE: Primary | ICD-10-CM

## 2020-08-26 DIAGNOSIS — C50.412 MALIGNANT NEOPLASM OF UPPER-OUTER QUADRANT OF LEFT FEMALE BREAST, UNSPECIFIED ESTROGEN RECEPTOR STATUS (HCC): ICD-10-CM

## 2020-08-26 PROCEDURE — 99396 PREV VISIT EST AGE 40-64: CPT | Performed by: FAMILY MEDICINE

## 2020-08-26 PROCEDURE — 3008F BODY MASS INDEX DOCD: CPT | Performed by: FAMILY MEDICINE

## 2020-08-26 PROCEDURE — 3078F DIAST BP <80 MM HG: CPT | Performed by: FAMILY MEDICINE

## 2020-08-26 PROCEDURE — 3074F SYST BP LT 130 MM HG: CPT | Performed by: FAMILY MEDICINE

## 2020-08-26 NOTE — PROGRESS NOTES
HPI:   Tatianna Parada is a 48year old female who presents for a complete physical exam. Symptoms: denies discharge, itching, burning or dysuria. Patient complains of nothing at this  time.  Has lost 13 pounds changing her diet to vegetarian, has a bit o abuse    • Unspecified essential hypertension       Past Surgical History:   Procedure Laterality Date   • BIOPSY OF BREAST, INCISIONAL     • BREAST SURGERY     • CERVICAL EPIDURAL STEROID INJECTION N/A 6/29/2016    Performed by Mitzy Damon MD at [facility] of breath with exertion  CARDIOVASCULAR: denies chest pain on exertion  GI: denies abdominal pain,denies heartburn  : denies dysuria, vaginal discharge or itching,periods regular   MUSCULOSKELETAL: denies back pain  NEURO: denies headaches  PSYCHE: denie maintenance  (primary encounter diagnosis)  Malignant neoplasm of upper-outer quadrant of left female breast, unspecified estrogen receptor status (hcc)    No orders of the defined types were placed in this encounter.       Meds & Refills for this Visit:  R

## 2020-08-27 ENCOUNTER — LAB ENCOUNTER (OUTPATIENT)
Dept: LAB | Age: 55
End: 2020-08-27
Attending: FAMILY MEDICINE
Payer: COMMERCIAL

## 2020-08-27 DIAGNOSIS — R47.89 WORD FINDING DIFFICULTY: ICD-10-CM

## 2020-08-27 DIAGNOSIS — Z00.00 ROUTINE HEALTH MAINTENANCE: ICD-10-CM

## 2020-08-27 LAB
ALBUMIN SERPL-MCNC: 3.6 G/DL (ref 3.4–5)
ALBUMIN/GLOB SERPL: 1.1 {RATIO} (ref 1–2)
ALP LIVER SERPL-CCNC: 65 U/L (ref 41–108)
ALT SERPL-CCNC: 25 U/L (ref 13–56)
ANION GAP SERPL CALC-SCNC: 2 MMOL/L (ref 0–18)
AST SERPL-CCNC: 23 U/L (ref 15–37)
BASOPHILS # BLD AUTO: 0.03 X10(3) UL (ref 0–0.2)
BASOPHILS NFR BLD AUTO: 0.5 %
BILIRUB SERPL-MCNC: 0.6 MG/DL (ref 0.1–2)
BUN BLD-MCNC: 12 MG/DL (ref 7–18)
BUN/CREAT SERPL: 12.9 (ref 10–20)
CALCIUM BLD-MCNC: 8.8 MG/DL (ref 8.5–10.1)
CHLORIDE SERPL-SCNC: 107 MMOL/L (ref 98–112)
CHOLEST SMN-MCNC: 179 MG/DL (ref ?–200)
CO2 SERPL-SCNC: 33 MMOL/L (ref 21–32)
CREAT BLD-MCNC: 0.93 MG/DL (ref 0.55–1.02)
DEPRECATED RDW RBC AUTO: 42.2 FL (ref 35.1–46.3)
EOSINOPHIL # BLD AUTO: 0.07 X10(3) UL (ref 0–0.7)
EOSINOPHIL NFR BLD AUTO: 1.2 %
ERYTHROCYTE [DISTWIDTH] IN BLOOD BY AUTOMATED COUNT: 13.2 % (ref 11–15)
GLOBULIN PLAS-MCNC: 3.2 G/DL (ref 2.8–4.4)
GLUCOSE BLD-MCNC: 87 MG/DL (ref 70–99)
HCT VFR BLD AUTO: 42.5 % (ref 35–48)
HDLC SERPL-MCNC: 67 MG/DL (ref 40–59)
HGB BLD-MCNC: 14.1 G/DL (ref 12–16)
IMM GRANULOCYTES # BLD AUTO: 0.01 X10(3) UL (ref 0–1)
IMM GRANULOCYTES NFR BLD: 0.2 %
LDLC SERPL CALC-MCNC: 103 MG/DL (ref ?–100)
LYMPHOCYTES # BLD AUTO: 1.88 X10(3) UL (ref 1–4)
LYMPHOCYTES NFR BLD AUTO: 32.4 %
M PROTEIN MFR SERPL ELPH: 6.8 G/DL (ref 6.4–8.2)
MCH RBC QN AUTO: 28.9 PG (ref 26–34)
MCHC RBC AUTO-ENTMCNC: 33.2 G/DL (ref 31–37)
MCV RBC AUTO: 87.1 FL (ref 80–100)
MONOCYTES # BLD AUTO: 0.52 X10(3) UL (ref 0.1–1)
MONOCYTES NFR BLD AUTO: 9 %
NEUTROPHILS # BLD AUTO: 3.29 X10 (3) UL (ref 1.5–7.7)
NEUTROPHILS # BLD AUTO: 3.29 X10(3) UL (ref 1.5–7.7)
NEUTROPHILS NFR BLD AUTO: 56.7 %
NONHDLC SERPL-MCNC: 112 MG/DL (ref ?–130)
OSMOLALITY SERPL CALC.SUM OF ELEC: 293 MOSM/KG (ref 275–295)
PATIENT FASTING Y/N/NP: YES
PATIENT FASTING Y/N/NP: YES
PLATELET # BLD AUTO: 238 10(3)UL (ref 150–450)
POTASSIUM SERPL-SCNC: 3.6 MMOL/L (ref 3.5–5.1)
RBC # BLD AUTO: 4.88 X10(6)UL (ref 3.8–5.3)
SODIUM SERPL-SCNC: 142 MMOL/L (ref 136–145)
T4 FREE SERPL-MCNC: 1 NG/DL (ref 0.8–1.7)
TRIGL SERPL-MCNC: 43 MG/DL (ref 30–149)
TSI SER-ACNC: 2.3 MIU/ML (ref 0.36–3.74)
VIT B12 SERPL-MCNC: 599 PG/ML (ref 193–986)
VLDLC SERPL CALC-MCNC: 9 MG/DL (ref 0–30)
WBC # BLD AUTO: 5.8 X10(3) UL (ref 4–11)

## 2020-08-27 PROCEDURE — 84443 ASSAY THYROID STIM HORMONE: CPT

## 2020-08-27 PROCEDURE — 80061 LIPID PANEL: CPT

## 2020-08-27 PROCEDURE — 84439 ASSAY OF FREE THYROXINE: CPT

## 2020-08-27 PROCEDURE — 36415 COLL VENOUS BLD VENIPUNCTURE: CPT

## 2020-08-27 PROCEDURE — 80053 COMPREHEN METABOLIC PANEL: CPT

## 2020-08-27 PROCEDURE — 85025 COMPLETE CBC W/AUTO DIFF WBC: CPT

## 2020-08-27 PROCEDURE — 82607 VITAMIN B-12: CPT

## 2020-08-28 ENCOUNTER — TELEPHONE (OUTPATIENT)
Dept: FAMILY MEDICINE CLINIC | Facility: CLINIC | Age: 55
End: 2020-08-28

## 2020-08-28 NOTE — TELEPHONE ENCOUNTER
----- Message from Kalie Tomas DO sent at 8/28/2020  8:36 AM CDT -----  Notify Andrew Ng  labs looked very good lipids were  Excellent kidney, liver function, blood sugar and thyroid were all normal.  Really nice job on her labs, keep up the good

## 2020-09-07 ENCOUNTER — HOSPITAL ENCOUNTER (OUTPATIENT)
Age: 55
Discharge: HOME | End: 2020-09-07
Payer: COMMERCIAL

## 2020-09-07 VITALS
SYSTOLIC BLOOD PRESSURE: 125 MMHG | HEART RATE: 64 BPM | DIASTOLIC BLOOD PRESSURE: 80 MMHG | TEMPERATURE: 98 F | OXYGEN SATURATION: 97 % | RESPIRATION RATE: 16 BRPM

## 2020-09-07 DIAGNOSIS — J01.00 ACUTE NON-RECURRENT MAXILLARY SINUSITIS: Primary | ICD-10-CM

## 2020-09-07 PROCEDURE — 99213 OFFICE O/P EST LOW 20 MIN: CPT | Performed by: NURSE PRACTITIONER

## 2020-09-07 RX ORDER — CLINDAMYCIN HYDROCHLORIDE 300 MG/1
300 CAPSULE ORAL 3 TIMES DAILY
Qty: 30 CAPSULE | Refills: 0 | Status: SHIPPED | OUTPATIENT
Start: 2020-09-07 | End: 2020-09-17

## 2020-09-07 NOTE — ED PROVIDER NOTES
Patient Seen in: [address] [facility]      History   Patient presents with:  Swelling    Stated Complaint: pain and swollen right side of face    61-year-old female presents today with complaints of right sided sinus pressure congestion dental Performed by Mehrdad Osman MD at [address] [place] [address]   • ESOPHAGOGASTRODUODENOSCOPY, POSSIBLE BIOPSY, POSSIBLE POLYPECTOMY 71109 N/A 1/31/2017    Performed by Mehrdad Osman MD at [address] [place] [address] - [facility]  1/17    esophageal nodule (papilloma) Uvula midline. Posterior oropharyngeal erythema present. Eyes:      Conjunctiva/sclera: Conjunctivae normal.      Pupils: Pupils are equal, round, and reactive to light. Neck:      Musculoskeletal: Normal range of motion and neck supple.    Jeanine Travis

## 2020-09-07 NOTE — ED INITIAL ASSESSMENT (HPI)
Pain and swelling to right side of face. States it felt like sinus pain on Thursday with a headache. Friday felt the pain worsening over a tooth. Today swelling and pain with chewing and feels her face is swollen. Gout

## 2020-09-22 ENCOUNTER — TELEPHONE (OUTPATIENT)
Dept: FAMILY MEDICINE CLINIC | Facility: CLINIC | Age: 55
End: 2020-09-22

## 2020-09-22 RX ORDER — TRIAMTERENE AND HYDROCHLOROTHIAZIDE 75; 50 MG/1; MG/1
0.5 TABLET ORAL DAILY
Qty: 135 TABLET | Refills: 2 | Status: SHIPPED | OUTPATIENT
Start: 2020-09-22 | End: 2020-12-14

## 2020-09-22 NOTE — TELEPHONE ENCOUNTER
Pharmacy faxed over refill robyn  TRiamterene 75mg HCTZ50   Take 1/2 tablet by mouth daily    Per DS okay to refill #135 W 2RF

## 2020-10-16 DIAGNOSIS — G56.03 BILATERAL CARPAL TUNNEL SYNDROME: ICD-10-CM

## 2020-10-16 RX ORDER — GABAPENTIN 300 MG/1
CAPSULE ORAL
Qty: 120 CAPSULE | Refills: 0 | Status: SHIPPED | OUTPATIENT
Start: 2020-10-16 | End: 2020-11-23

## 2020-10-16 NOTE — TELEPHONE ENCOUNTER
Patient scheduled a video visit on 10/30. Email sent with instructions.     Medication: GABAPENTIN 300 MG    Date of last refill: 7/23/20 (#360/0)  [place]     Last office visit: 4/16/20  Due back to clinic per last offic

## 2020-10-19 ENCOUNTER — APPOINTMENT (OUTPATIENT)
Dept: LAB | Age: 55
End: 2020-10-19
Attending: FAMILY MEDICINE
Payer: COMMERCIAL

## 2020-10-19 DIAGNOSIS — Z20.822 CLOSE EXPOSURE TO COVID-19 VIRUS: ICD-10-CM

## 2020-10-22 ENCOUNTER — TELEPHONE (OUTPATIENT)
Dept: FAMILY MEDICINE CLINIC | Facility: CLINIC | Age: 55
End: 2020-10-22

## 2020-10-22 NOTE — TELEPHONE ENCOUNTER
Pt was advised of results- verbalized understanding    Pt states she is staying home and managing sxs as they needed

## 2020-10-22 NOTE — TELEPHONE ENCOUNTER
----- Message from Rae Maria DO sent at 10/22/2020  2:14 PM CDT -----  Can notify Tian Rivera she has COVID

## 2020-10-30 ENCOUNTER — TELEMEDICINE (OUTPATIENT)
Dept: SURGERY | Facility: CLINIC | Age: 55
End: 2020-10-30

## 2020-10-30 DIAGNOSIS — G56.03 BILATERAL CARPAL TUNNEL SYNDROME: ICD-10-CM

## 2020-10-30 DIAGNOSIS — R47.89 WORD FINDING DIFFICULTY: Primary | ICD-10-CM

## 2020-10-30 DIAGNOSIS — G25.0 ESSENTIAL TREMOR: ICD-10-CM

## 2020-10-30 PROCEDURE — 99213 OFFICE O/P EST LOW 20 MIN: CPT | Performed by: OTHER

## 2020-10-30 NOTE — PROGRESS NOTES
[facility]:  No patient class for patient encounter    1965 MRN [id number]   Location [address] [place] [address], [address] [place] [address] Drive, [address] [place] [address] PCP Piero Credit, DO              HPI: are usually when holding a cup or stirring. She often (last 6 months) notices a hoarse voice intermittently for past few months. It can occur at the end of the day, and last 1-2 days. Sometimes she has general bilateral blurry vision.  She has occasionally hydrocephalus, herniation, abnormal enhancement pattern. Mild chronic ischemic white matter changes are present. EMG 3/2019  Conclusion:   1. This is a mildly abnormal study.   2. There is evidence of bilateral mild median compressive mononeuropathies 62830 N/A 1/31/2017    Performed by Dash Araujo MD at [address] [place] [address] - [facility]  1/17    esophageal nodule (papilloma)   • LUMPECTOMY LEFT  01/03/2011    DCIS   • BRANDON LOCALIZATION WIRE 1 SITE RIGHT (CPT=19281) Right     BENIGN   Late 90's weakness or atrophy. Hearing was grossly intact.  Shoulder shrug was normal.   Motor exam revealed ability to raise both arms above head with ease, and ability to lift both hip flexors to 90 degrees or near 90 degrees, able to stand on one foot bilaterally was spent on reviewing labs, medications, radiology tests and decision making. Appropriate medical decision-making and tests are ordered as detailed in the plan of care above. We discussed in depth regarding the diagnosis, prognosis, treatment.  The pa

## 2020-11-19 ENCOUNTER — IMMUNIZATION (OUTPATIENT)
Dept: FAMILY MEDICINE CLINIC | Facility: CLINIC | Age: 55
End: 2020-11-19

## 2020-11-19 DIAGNOSIS — Z23 NEED FOR VACCINATION: ICD-10-CM

## 2020-11-19 PROCEDURE — 90471 IMMUNIZATION ADMIN: CPT | Performed by: FAMILY MEDICINE

## 2020-11-19 PROCEDURE — 90686 IIV4 VACC NO PRSV 0.5 ML IM: CPT | Performed by: FAMILY MEDICINE

## 2020-11-23 DIAGNOSIS — G56.03 BILATERAL CARPAL TUNNEL SYNDROME: ICD-10-CM

## 2020-11-23 RX ORDER — GABAPENTIN 300 MG/1
CAPSULE ORAL
Qty: 120 CAPSULE | Refills: 0 | Status: SHIPPED | OUTPATIENT
Start: 2020-11-23 | End: 2021-05-14

## 2020-11-23 NOTE — TELEPHONE ENCOUNTER
Medication:GABAPENTIN 300 MG Oral Cap    Date of last refill: 10/16/2020 (#120/0)  [place]    Last office visit: 10/30/2020  Due back to clinic per last office note:  6 months  Date next office visit scheduled:  No f

## 2020-12-09 ENCOUNTER — E-VISIT (OUTPATIENT)
Dept: TELEHEALTH | Age: 55
End: 2020-12-09

## 2020-12-09 DIAGNOSIS — R39.9 UTI SYMPTOMS: Primary | ICD-10-CM

## 2020-12-09 PROCEDURE — 99421 OL DIG E/M SVC 5-10 MIN: CPT | Performed by: NURSE PRACTITIONER

## 2020-12-09 RX ORDER — NITROFURANTOIN 25; 75 MG/1; MG/1
100 CAPSULE ORAL 2 TIMES DAILY
Qty: 14 CAPSULE | Refills: 0 | Status: SHIPPED | OUTPATIENT
Start: 2020-12-09 | End: 2020-12-16

## 2020-12-09 NOTE — PROGRESS NOTES
Andrew Ng is a 48year old female. HPI:   See answers to questions above.      Current Outpatient Medications   Medication Sig Dispense Refill   • Nitrofurantoin Monohyd Macro 100 MG Oral Cap Take 1 capsule (100 mg total) by mouth 2 (two) times yokasta Uncle         stomach CA      Social History:  Social History    Tobacco Use      Smoking status: Former Smoker        Packs/day: 1.00        Types: Cigarettes        Quit date: 10/1/2014        Years since quittin.1      Smokeless tobacco: Never Used

## 2020-12-14 RX ORDER — TRIAMTERENE AND HYDROCHLOROTHIAZIDE 75; 50 MG/1; MG/1
0.5 TABLET ORAL DAILY
Qty: 135 TABLET | Refills: 2 | Status: SHIPPED | OUTPATIENT
Start: 2020-12-14

## 2021-01-04 ENCOUNTER — PATIENT MESSAGE (OUTPATIENT)
Dept: NEUROLOGY | Facility: CLINIC | Age: 56
End: 2021-01-04

## 2021-01-04 NOTE — TELEPHONE ENCOUNTER
From: Niyah Serrano  To: Rosalia Jin DO  Sent: 1/4/2021 1:34 PM CST  Subject: Other    Good afternoon. I know we discussed doing a sleep study but I cannot find any information on that in order to schedule it.     Could you provide me with th

## 2021-01-07 ENCOUNTER — OFFICE VISIT (OUTPATIENT)
Dept: FAMILY MEDICINE CLINIC | Facility: CLINIC | Age: 56
End: 2021-01-07

## 2021-01-07 VITALS
HEART RATE: 60 BPM | RESPIRATION RATE: 12 BRPM | DIASTOLIC BLOOD PRESSURE: 60 MMHG | TEMPERATURE: 99 F | BODY MASS INDEX: 25 KG/M2 | SYSTOLIC BLOOD PRESSURE: 110 MMHG | WEIGHT: 167 LBS

## 2021-01-07 DIAGNOSIS — S43.422A SPRAIN OF LEFT ROTATOR CUFF CAPSULE, INITIAL ENCOUNTER: Primary | ICD-10-CM

## 2021-01-07 DIAGNOSIS — M25.512 ACUTE PAIN OF LEFT SHOULDER: ICD-10-CM

## 2021-01-07 DIAGNOSIS — M54.2 NECK PAIN, ACUTE: ICD-10-CM

## 2021-01-07 DIAGNOSIS — M62.838 CERVICAL PARASPINAL MUSCLE SPASM: ICD-10-CM

## 2021-01-07 PROCEDURE — 99214 OFFICE O/P EST MOD 30 MIN: CPT | Performed by: FAMILY MEDICINE

## 2021-01-07 PROCEDURE — 3074F SYST BP LT 130 MM HG: CPT | Performed by: FAMILY MEDICINE

## 2021-01-07 PROCEDURE — 3078F DIAST BP <80 MM HG: CPT | Performed by: FAMILY MEDICINE

## 2021-01-07 RX ORDER — CYCLOBENZAPRINE HCL 10 MG
10 TABLET ORAL NIGHTLY
Qty: 10 TABLET | Refills: 0 | Status: SHIPPED | OUTPATIENT
Start: 2021-01-07 | End: 2021-01-17

## 2021-01-07 RX ORDER — NAPROXEN 500 MG/1
500 TABLET ORAL 2 TIMES DAILY WITH MEALS
Qty: 20 TABLET | Refills: 0 | Status: SHIPPED | OUTPATIENT
Start: 2021-01-07 | End: 2021-01-17

## 2021-01-07 RX ORDER — EPINEPHRINE 0.3 MG/.3ML
0.3 INJECTION SUBCUTANEOUS
COMMUNITY
Start: 2019-10-11

## 2021-01-07 NOTE — PROGRESS NOTES
Gonzalo Sinclair is a 48year old female. HPI:   Andres Jeronimo is here for evaluation of left arm and shoulder pain, which has been present for some time, she has a known HX of C5-6 disc disease.  The pain started in her arm originally and then is now radiating shoulder  SKIN: denies any unusual skin lesions or rashes  RESPIRATORY: denies shortness of breath with exertion  CARDIOVASCULAR: denies chest pain on exertion  GI: denies abdominal pain and denies heartburn  NEURO: denies headaches  MUSC: left lateral myranda

## 2021-01-09 ENCOUNTER — E-VISIT (OUTPATIENT)
Dept: TELEHEALTH | Age: 56
End: 2021-01-09

## 2021-01-09 DIAGNOSIS — R30.0 DYSURIA: Primary | ICD-10-CM

## 2021-01-09 PROCEDURE — 99421 OL DIG E/M SVC 5-10 MIN: CPT | Performed by: NURSE PRACTITIONER

## 2021-01-09 RX ORDER — NITROFURANTOIN 25; 75 MG/1; MG/1
100 CAPSULE ORAL 2 TIMES DAILY
Qty: 14 CAPSULE | Refills: 0 | Status: SHIPPED | OUTPATIENT
Start: 2021-01-09 | End: 2021-01-16

## 2021-01-09 NOTE — PROGRESS NOTES
Patient requested an E-Visit. After reviewing history, symptoms and issuing a prescription, 8 minutes of time was accured. Please see E-Visit for further information.

## 2021-01-20 ENCOUNTER — OFFICE VISIT (OUTPATIENT)
Dept: FAMILY MEDICINE CLINIC | Facility: CLINIC | Age: 56
End: 2021-01-20

## 2021-01-20 VITALS
SYSTOLIC BLOOD PRESSURE: 110 MMHG | HEART RATE: 58 BPM | BODY MASS INDEX: 25 KG/M2 | TEMPERATURE: 99 F | DIASTOLIC BLOOD PRESSURE: 80 MMHG | WEIGHT: 167 LBS | OXYGEN SATURATION: 98 %

## 2021-01-20 DIAGNOSIS — M25.512 ACUTE PAIN OF LEFT SHOULDER: ICD-10-CM

## 2021-01-20 DIAGNOSIS — S43.422A SPRAIN OF LEFT ROTATOR CUFF CAPSULE, INITIAL ENCOUNTER: Primary | ICD-10-CM

## 2021-01-20 PROCEDURE — 3074F SYST BP LT 130 MM HG: CPT | Performed by: FAMILY MEDICINE

## 2021-01-20 PROCEDURE — 99214 OFFICE O/P EST MOD 30 MIN: CPT | Performed by: FAMILY MEDICINE

## 2021-01-20 PROCEDURE — 3079F DIAST BP 80-89 MM HG: CPT | Performed by: FAMILY MEDICINE

## 2021-01-20 NOTE — PROGRESS NOTES
Negar Petty is a 48year old female. HPI:   Kaity Beck is here for evaluation of left arm and shoulder pain, which has been present for some time, she has a known HX of C5-6 disc disease.  The pain started in her arm originally and then is now radiating headaches  MUSC: left lateral, and left anterior  shoulder pain  EXAM:   /80   Pulse 58   Temp 98.7 °F (37.1 °C) (Temporal)   Wt 167 lb (75.8 kg)   SpO2 98%   BMI 25.39 kg/m²   GENERAL: well developed, well nourished,in no apparent distress  SKIN: no

## 2021-01-21 DIAGNOSIS — N30.00 ACUTE CYSTITIS WITHOUT HEMATURIA: ICD-10-CM

## 2021-01-21 RX ORDER — ALPRAZOLAM 0.5 MG/1
TABLET ORAL
Qty: 2 TABLET | Refills: 0 | Status: SHIPPED | OUTPATIENT
Start: 2021-01-21 | End: 2021-02-04

## 2021-01-21 RX ORDER — NITROFURANTOIN 25; 75 MG/1; MG/1
100 CAPSULE ORAL 2 TIMES DAILY
Qty: 14 CAPSULE | Refills: 0 | Status: SHIPPED | OUTPATIENT
Start: 2021-01-21 | End: 2021-01-28

## 2021-01-28 ENCOUNTER — TELEPHONE (OUTPATIENT)
Dept: FAMILY MEDICINE CLINIC | Facility: CLINIC | Age: 56
End: 2021-01-28

## 2021-01-28 ENCOUNTER — HOSPITAL ENCOUNTER (OUTPATIENT)
Dept: MRI IMAGING | Age: 56
Discharge: HOME | End: 2021-01-28
Attending: FAMILY MEDICINE
Payer: COMMERCIAL

## 2021-01-28 DIAGNOSIS — M25.512 ACUTE PAIN OF LEFT SHOULDER: ICD-10-CM

## 2021-01-28 DIAGNOSIS — M75.82 ROTATOR CUFF TENDONITIS, LEFT: ICD-10-CM

## 2021-01-28 DIAGNOSIS — S43.422A SPRAIN OF LEFT ROTATOR CUFF CAPSULE, INITIAL ENCOUNTER: ICD-10-CM

## 2021-01-28 DIAGNOSIS — S43.432A SUPERIOR LABRUM ANTERIOR-TO-POSTERIOR (SLAP) TEAR OF LEFT SHOULDER: Primary | ICD-10-CM

## 2021-01-28 PROCEDURE — 73221 MRI JOINT UPR EXTREM W/O DYE: CPT | Performed by: FAMILY MEDICINE

## 2021-01-28 NOTE — TELEPHONE ENCOUNTER
----- Message from Brisa Briscoe DO sent at 1/28/2021  3:42 PM CST -----  Can notify Joselo Rosenthal, her MRI showed, she has some inflammation of the one of the rotator cuff tendons, but I think the bigger issue is she has a tear in the cartilage of the plate the

## 2021-01-28 NOTE — TELEPHONE ENCOUNTER
Dr. Husam Neil [phone]    Pt was advised- verbalized understanding    Pt was provided with number to call and schedule

## 2021-02-01 ENCOUNTER — PATIENT MESSAGE (OUTPATIENT)
Dept: FAMILY MEDICINE CLINIC | Facility: CLINIC | Age: 56
End: 2021-02-01

## 2021-02-01 NOTE — TELEPHONE ENCOUNTER
From: Nellie Jordan  To: Venkat Blair DO  Sent: 2/1/2021 10:25 AM CST  Subject: Referral Request    Good morning. The referral for Dr. Corbin Mcardle needs to be replaced. Apparently he specialized in hip/knee replacement.   There are other Dr.s in the gr

## 2021-02-04 ENCOUNTER — OFFICE VISIT (OUTPATIENT)
Dept: ORTHOPEDICS CLINIC | Facility: CLINIC | Age: 56
End: 2021-02-04

## 2021-02-04 VITALS — HEART RATE: 60 BPM | OXYGEN SATURATION: 97 %

## 2021-02-04 DIAGNOSIS — M75.02 ADHESIVE CAPSULITIS OF LEFT SHOULDER: Primary | ICD-10-CM

## 2021-02-04 PROCEDURE — 20610 DRAIN/INJ JOINT/BURSA W/O US: CPT | Performed by: ORTHOPAEDIC SURGERY

## 2021-02-04 PROCEDURE — 99203 OFFICE O/P NEW LOW 30 MIN: CPT | Performed by: ORTHOPAEDIC SURGERY

## 2021-02-04 RX ORDER — KETOROLAC TROMETHAMINE 30 MG/ML
30 INJECTION, SOLUTION INTRAMUSCULAR; INTRAVENOUS ONCE
Status: COMPLETED | OUTPATIENT
Start: 2021-02-04 | End: 2021-02-04

## 2021-02-04 RX ORDER — TRIAMCINOLONE ACETONIDE 40 MG/ML
40 INJECTION, SUSPENSION INTRA-ARTICULAR; INTRAMUSCULAR ONCE
Status: COMPLETED | OUTPATIENT
Start: 2021-02-04 | End: 2021-02-04

## 2021-02-04 RX ADMIN — TRIAMCINOLONE ACETONIDE 40 MG: 40 INJECTION, SUSPENSION INTRA-ARTICULAR; INTRAMUSCULAR at 15:27:00

## 2021-02-04 RX ADMIN — KETOROLAC TROMETHAMINE 30 MG: 30 INJECTION, SOLUTION INTRAMUSCULAR; INTRAVENOUS at 15:27:00

## 2021-02-04 NOTE — H&P
[facility] - ORTHOPEDICS  [facility] [address]  [phone]     NEW PATIENT VISIT - HISTORY AND PHYSICAL EXAMINATION     Name: Jacki Castillo   MRN: [id number]  Date: 2/4/2021     CC: Left should • ESOPHAGOGASTRODUODENOSCOPY, POSSIBLE BIOPSY, POSSIBLE POLYPECTOMY 22985 N/A 1/31/2017    Performed by Verenice Frausto MD at [address] [place] [address]  1/17    esophageal nodule (papilloma)   • LUMPECTOMY LEFT  01/03/2011    DCIS   • BRANDON Ocampo Findings: No bruising. Neurological:      General: No focal deficit present. Mental Status: Alert. Psychiatric:         Mood and Affect: Mood normal.     Examination of the left shoulder demonstrates:     Skin is intact, warm and dry.    Cervica PROCEDURE:  MRI SHOULDER, LEFT (CPT=73221)  COMPARISON:  None.   INDICATIONS:  M25.512 Acute pain of left shoulder S43.422A Sprain of left rotator cuff capsule, initial encounter  TECHNIQUE:  Multiplanar imaging of the shoulder including oblique coronal, ax IMPRESSION: Niyah Serrano is a 55year old female with Left shoulder adhesive capsulitis. No symptomatic relief has been noted with anti-inflammatories and conservative measures.   They will benefit from a glenohumeral intra-articular corticosteroid an

## 2021-02-04 NOTE — PROCEDURES
Left Shoulder Glenohumeral Joint Injection    Name: Thom Hightower   MRN: [id number]  Date: 2/4/2021     Clinical Indications:   Adhesive Capsulitis.      After informed consent, the injection site was marked, sterilized with topical chlorhexidine antisep

## 2021-02-08 ENCOUNTER — TELEPHONE (OUTPATIENT)
Dept: PHYSICAL THERAPY | Facility: HOSPITAL | Age: 56
End: 2021-02-08

## 2021-02-08 ENCOUNTER — OFFICE VISIT (OUTPATIENT)
Dept: PHYSICAL THERAPY | Age: 56
End: 2021-02-08
Attending: ORTHOPAEDIC SURGERY
Payer: COMMERCIAL

## 2021-02-08 DIAGNOSIS — M75.02 ADHESIVE CAPSULITIS OF LEFT SHOULDER: ICD-10-CM

## 2021-02-08 PROCEDURE — 97112 NEUROMUSCULAR REEDUCATION: CPT

## 2021-02-08 PROCEDURE — 97162 PT EVAL MOD COMPLEX 30 MIN: CPT

## 2021-02-08 NOTE — PROGRESS NOTES
SHOULDER EVALUATION:   Referring Physician: Dr. Valerio Child  Diagnosis: Frozen Shoulder     Date of Service: 2/8/2021     PATIENT SUMMARY   Adrian Lockhart is a 55year old female who presents to therapy today with complaints of Left shoulder pain   Pt descri and reach functional goals. Precautions:  None  OBJECTIVE:   Observation/Posture: forward head and slumped poor posture.  Posture correction abolish pain on L UE  Palpation: Tender to bilateral lateral deltoid, slightly posterior to humerus  Sensation: overpressure    Charges: PT Eval Moderate Complexity      Total Timed Treatment: 50 min     Total Treatment Time: 50 min     Based on clinical rationale and outcome measures, this evaluation involved Moderate Complexity decision making due to 1-2 personal

## 2021-02-10 ENCOUNTER — OFFICE VISIT (OUTPATIENT)
Dept: PHYSICAL THERAPY | Age: 56
End: 2021-02-10
Attending: ORTHOPAEDIC SURGERY
Payer: COMMERCIAL

## 2021-02-10 DIAGNOSIS — M75.02 ADHESIVE CAPSULITIS OF LEFT SHOULDER: ICD-10-CM

## 2021-02-10 PROCEDURE — 97110 THERAPEUTIC EXERCISES: CPT

## 2021-02-10 PROCEDURE — 97112 NEUROMUSCULAR REEDUCATION: CPT

## 2021-02-10 NOTE — PROGRESS NOTES
Dx: L shoulder pain         Insurance (Authorized # of Visits):  21           Authorizing Physician: Dr. Itzel Gonzales  Next MD visit: none scheduled  Fall Risk: standard         Precautions: n/a             Subjective:   About the same  Did HEP about 6x a day (di NE during, after R side of neck pain abolished, NE shoulder tests       Shoulder: hand behind back AROM: P, NW during. NE after       Shoulder: ext AROM: P, NW.  After a little better       Shoulder: flexion: A little better       Shoulder horizontal adduct

## 2021-02-15 ENCOUNTER — OFFICE VISIT (OUTPATIENT)
Dept: PHYSICAL THERAPY | Age: 56
End: 2021-02-15
Attending: FAMILY MEDICINE
Payer: COMMERCIAL

## 2021-02-15 PROCEDURE — 97112 NEUROMUSCULAR REEDUCATION: CPT

## 2021-02-15 PROCEDURE — 97140 MANUAL THERAPY 1/> REGIONS: CPT

## 2021-02-15 PROCEDURE — 97110 THERAPEUTIC EXERCISES: CPT

## 2021-02-15 NOTE — PROGRESS NOTES
Dx: L shoulder pain         Insurance (Authorized # of Visits):  21           Authorizing Physician: Dr. Itzel Gonzales  Next MD visit: none scheduled  Fall Risk: standard         Precautions: n/a             Subjective:   A little improved, shoveled with no issue, OP: NE during, but after much better (added to HEP)      R rotation with retraction: NE during (with OP into R rotation has R sided neck pain): NE Manual therapy: supine: ret/ext/rotation with traction.  (5 minutes)      Ret/ext/rotation w/o OP: NE during,

## 2021-02-17 ENCOUNTER — OFFICE VISIT (OUTPATIENT)
Dept: PHYSICAL THERAPY | Age: 56
End: 2021-02-17
Attending: FAMILY MEDICINE
Payer: COMMERCIAL

## 2021-02-17 ENCOUNTER — TELEPHONE (OUTPATIENT)
Dept: FAMILY MEDICINE CLINIC | Facility: CLINIC | Age: 56
End: 2021-02-17

## 2021-02-17 DIAGNOSIS — N39.0 RECURRENT UTI: Primary | ICD-10-CM

## 2021-02-17 PROCEDURE — 97112 NEUROMUSCULAR REEDUCATION: CPT

## 2021-02-17 PROCEDURE — 97110 THERAPEUTIC EXERCISES: CPT

## 2021-02-17 NOTE — TELEPHONE ENCOUNTER
RN can not see a request for referral in epic in the past- but DS states that it is fine to place referral    Future Appointments   Date Time Provider Irvin Dela Cruz   2/17/2021  4:45 PM Joel [facility]   2/22/2021  4:00 PM Jeanine Coyne

## 2021-02-17 NOTE — PROGRESS NOTES
Dx: L shoulder pain         Insurance (Authorized # of Visits):  21           Authorizing Physician: Dr. Sheila Krishnamurthy  Next MD visit: none scheduled  Fall Risk: standard         Precautions: n/a             Subjective:   Shoulder much better, thinking about it le not happen when did this for HEP, but only did 10 reps for HEP: NE shoulder tests Shoulder extension stretch with squat OP pt forces: NE  Shoulder horizontal abduction with doorway OP: NE during, but after much better (added to HEP) Shoulder horizontal abd Treatment: 39 min  Total Treatment Time: 39 min

## 2021-02-17 NOTE — TELEPHONE ENCOUNTER
She at [place] [address] seeing DR Aleksander Ulrich she said she is supposed to have a referral she is waitning at office

## 2021-02-22 ENCOUNTER — OFFICE VISIT (OUTPATIENT)
Dept: PHYSICAL THERAPY | Age: 56
End: 2021-02-22
Attending: FAMILY MEDICINE
Payer: COMMERCIAL

## 2021-02-22 PROCEDURE — 97110 THERAPEUTIC EXERCISES: CPT

## 2021-02-22 PROCEDURE — 97112 NEUROMUSCULAR REEDUCATION: CPT

## 2021-02-22 NOTE — PROGRESS NOTES
Dx: L shoulder pain         Insurance (Authorized # of Visits):  21           Authorizing Physician: Dr. Cameron Sparrow  Next MD visit: none scheduled  Fall Risk: standard         Precautions: n/a             Subjective:   Shoulder improving, mostly pain with reach 75/100)  Decrease pain level from 10/10 to 5/10 on VAS scale (On 2/22/2021 reported 7/10 VAS at its worst, 60% improved)  Improve shoulder ROM to free and full (On 2/22/2021, 50% improved)  Abolish 5lbs overhead weight test and yellow theraband pull test ( effect Spinal stretching: all with no effect (cervical retraction pt forces, thoracic extension pt forces and therapist forces).     Ret/ext/rotation w/o OP: NE during, after R side of neck pain abolished, NE shoulder tests Ret/ext/rotation w/o OP x5: NE du

## 2021-02-25 ENCOUNTER — APPOINTMENT (OUTPATIENT)
Dept: PHYSICAL THERAPY | Age: 56
End: 2021-02-25
Attending: FAMILY MEDICINE
Payer: COMMERCIAL

## 2021-03-01 ENCOUNTER — OFFICE VISIT (OUTPATIENT)
Dept: PHYSICAL THERAPY | Age: 56
End: 2021-03-01
Attending: FAMILY MEDICINE
Payer: COMMERCIAL

## 2021-03-01 PROCEDURE — 97110 THERAPEUTIC EXERCISES: CPT

## 2021-03-01 PROCEDURE — 97112 NEUROMUSCULAR REEDUCATION: CPT

## 2021-03-01 NOTE — PROGRESS NOTES
Discharge Summary  Pt has attended 6 visits in Physical Therapy.      Dx: L shoulder pain         Insurance (Authorized # of Visits):  21           Authorizing Physician: Dr. Shahram Calloway  Next MD visit: none scheduled  Fall Risk: standard         Precautions: n/ precautions, and treatment options and has agreed to actively participate in planning and for this course of care. Thank you for your referral. If you have any questions, please contact me at Dept: [phone].     Sincerely,  Electronically signed by ritu doorway, 3rd set done   R rotation with retraction: NE during (with OP into R rotation has R sided neck pain): NE Manual therapy: supine: ret/ext/rotation with traction.  (5 minutes) Cervical stretching (all with retraction and all about 10-20 reps done and flexion AROM: P, NW. But now pain at rest 30 seconds.               HEP: Shoulder flexion in doorway x5 reps, every 2 hours, cervical flexion with R lateral flexion    Charges: 2 neuro-reeducation and 1 therex        Total Timed Treatment: 39 min  Total Arun

## 2021-03-04 ENCOUNTER — APPOINTMENT (OUTPATIENT)
Dept: PHYSICAL THERAPY | Age: 56
End: 2021-03-04
Attending: FAMILY MEDICINE
Payer: COMMERCIAL

## 2021-03-07 ENCOUNTER — HOSPITAL ENCOUNTER (OUTPATIENT)
Age: 56
Discharge: HOME | End: 2021-03-07
Payer: COMMERCIAL

## 2021-03-07 VITALS
HEART RATE: 78 BPM | SYSTOLIC BLOOD PRESSURE: 118 MMHG | RESPIRATION RATE: 15 BRPM | TEMPERATURE: 98 F | DIASTOLIC BLOOD PRESSURE: 76 MMHG | OXYGEN SATURATION: 100 %

## 2021-03-07 DIAGNOSIS — N30.01 ACUTE CYSTITIS WITH HEMATURIA: Primary | ICD-10-CM

## 2021-03-07 LAB
POCT BILIRUBIN URINE: NEGATIVE
POCT GLUCOSE URINE: NEGATIVE MG/DL
POCT KETONE URINE: NEGATIVE MG/DL
POCT NITRITE URINE: NEGATIVE
POCT PH URINE: 8 (ref 5–8)
POCT SPECIFIC GRAVITY URINE: 1.02
POCT URINE COLOR: YELLOW
POCT UROBILINOGEN URINE: 0.2 MG/DL

## 2021-03-07 PROCEDURE — 81002 URINALYSIS NONAUTO W/O SCOPE: CPT | Performed by: NURSE PRACTITIONER

## 2021-03-07 PROCEDURE — 99213 OFFICE O/P EST LOW 20 MIN: CPT | Performed by: NURSE PRACTITIONER

## 2021-03-07 RX ORDER — SULFAMETHOXAZOLE AND TRIMETHOPRIM 800; 160 MG/1; MG/1
1 TABLET ORAL 2 TIMES DAILY
Qty: 14 TABLET | Refills: 0 | Status: SHIPPED | OUTPATIENT
Start: 2021-03-07 | End: 2021-03-14

## 2021-03-07 RX ORDER — PHENAZOPYRIDINE HYDROCHLORIDE 100 MG/1
200 TABLET, FILM COATED ORAL 3 TIMES DAILY PRN
Qty: 6 TABLET | Refills: 0 | Status: SHIPPED | OUTPATIENT
Start: 2021-03-07 | End: 2021-03-14

## 2021-03-07 NOTE — ED PROVIDER NOTES
Patient Seen in: [facility] [address] [place] [address]      History   Patient presents with:  UTI    Stated Complaint: uti    HPI/Subjective:   HPI  45-year-old female with history of UTIs presents with urinary frequency, urgency, and dysuria since Friday.   She denies a Vaping Use: Never used    Alcohol use: No      Alcohol/week: 3.0 standard drinks      Types: 3 Standard drinks or equivalent per week      Comment: rare    Drug use: No             Review of Systems   All other systems reviewed and are negative.       Posit culture grew out strep. Patient was on Bactrim with good results. Patient will be given Bactrim and Pyridium as needed for symptoms with ER precautions. Patient is afebrile and nontoxic. She does not have any flank pain.                            Dispo

## 2021-03-07 NOTE — ED INITIAL ASSESSMENT (HPI)
Onset of UTI symptoms since Friday. States hx of frequent UTI's, spoke to urologist and was going to wait until Monday but frequency worsened.

## 2021-03-08 ENCOUNTER — APPOINTMENT (OUTPATIENT)
Dept: PHYSICAL THERAPY | Age: 56
End: 2021-03-08
Attending: FAMILY MEDICINE
Payer: COMMERCIAL

## 2021-03-08 NOTE — PROGRESS NOTES
Attempted to call the patient. No option to leave message. Patient is penicillin allergic. Should ask if improving on Bactrim.

## 2021-03-11 ENCOUNTER — APPOINTMENT (OUTPATIENT)
Dept: PHYSICAL THERAPY | Age: 56
End: 2021-03-11
Attending: FAMILY MEDICINE
Payer: COMMERCIAL

## 2021-03-29 ENCOUNTER — PATIENT MESSAGE (OUTPATIENT)
Dept: FAMILY MEDICINE CLINIC | Facility: CLINIC | Age: 56
End: 2021-03-29

## 2021-03-29 NOTE — TELEPHONE ENCOUNTER
From: Janelle Marking  To: Pratibha Mar DO  Sent: 3/29/2021 9:07 AM CDT  Subject: Non-Urgent Medical Question    Good morning.  I know I have asked this before but the DrMeryl's I was given were not accepting new patients but I was wondering if you could ref

## 2021-03-29 NOTE — TELEPHONE ENCOUNTER
ROUTING TO PROVIDER as REMBERTO    RN spoke with pt- advised DS is out of the office. She states that mental health providers she was referred to are not seeing new patients.     Pt is having some depression - and they just lost her dog and she is feeling eugene

## 2021-04-30 DIAGNOSIS — M25.512 ACUTE PAIN OF LEFT SHOULDER: ICD-10-CM

## 2021-04-30 DIAGNOSIS — S43.422A SPRAIN OF LEFT ROTATOR CUFF CAPSULE, INITIAL ENCOUNTER: Primary | ICD-10-CM

## 2021-05-01 DIAGNOSIS — G56.03 BILATERAL CARPAL TUNNEL SYNDROME: ICD-10-CM

## 2021-05-03 NOTE — TELEPHONE ENCOUNTER
Patient is over due for marisol't . Attempted to reach patient but no answer and no VM.     Medication: GABAPENTIN 300 MG     Date of last refill: 11/23/20 (#120/0)  [place]     Last office visit: 1/20/2021  Due back to clini

## 2021-05-10 ENCOUNTER — OFFICE VISIT (OUTPATIENT)
Dept: ORTHOPEDICS CLINIC | Facility: CLINIC | Age: 56
End: 2021-05-10

## 2021-05-10 VITALS — OXYGEN SATURATION: 99 % | HEART RATE: 62 BPM

## 2021-05-10 DIAGNOSIS — M75.02 ADHESIVE CAPSULITIS OF LEFT SHOULDER: Primary | ICD-10-CM

## 2021-05-10 PROCEDURE — 99213 OFFICE O/P EST LOW 20 MIN: CPT | Performed by: ORTHOPAEDIC SURGERY

## 2021-05-10 PROCEDURE — 20610 DRAIN/INJ JOINT/BURSA W/O US: CPT | Performed by: ORTHOPAEDIC SURGERY

## 2021-05-10 RX ORDER — KETOROLAC TROMETHAMINE 30 MG/ML
30 INJECTION, SOLUTION INTRAMUSCULAR; INTRAVENOUS ONCE
Status: COMPLETED | OUTPATIENT
Start: 2021-05-10 | End: 2021-05-10

## 2021-05-10 RX ORDER — TRIAMCINOLONE ACETONIDE 40 MG/ML
40 INJECTION, SUSPENSION INTRA-ARTICULAR; INTRAMUSCULAR ONCE
Status: COMPLETED | OUTPATIENT
Start: 2021-05-10 | End: 2021-05-10

## 2021-05-10 RX ADMIN — TRIAMCINOLONE ACETONIDE 40 MG: 40 INJECTION, SUSPENSION INTRA-ARTICULAR; INTRAMUSCULAR at 16:40:00

## 2021-05-10 RX ADMIN — KETOROLAC TROMETHAMINE 30 MG: 30 INJECTION, SOLUTION INTRAMUSCULAR; INTRAVENOUS at 16:40:00

## 2021-05-12 NOTE — PROGRESS NOTES
[facility]  [facility] [address] [place] [phone]       Name: Jozef Kohli   MRN: [id number]  Date: 5/10/2021     REASON FOR VISIT: Follow-up evaluation for recurrent left shoulder pa in terminal forward elevation, abduction, internal rotation and external rotation, approximately 5 degrees. 5 out of 5 strength in supraspinatus, infraspinatus, subscapularis.   Equivocal [place]'s test, pain with each movement–although strength maintain

## 2021-05-12 NOTE — PROCEDURES
Left Shoulder Glenohumeral Joint Injection    Name: Geoff Sherman   MRN: [id number]  Date: 5/10/2021     Clinical Indications:   Adhesive Capsulitis.      After informed consent, the injection site was marked, sterilized with topical chlorhexidine antise

## 2021-05-14 ENCOUNTER — PATIENT MESSAGE (OUTPATIENT)
Dept: NEUROLOGY | Facility: CLINIC | Age: 56
End: 2021-05-14

## 2021-05-14 RX ORDER — GABAPENTIN 300 MG/1
CAPSULE ORAL
Qty: 120 CAPSULE | Refills: 0 | OUTPATIENT
Start: 2021-05-14

## 2021-05-14 RX ORDER — GABAPENTIN 300 MG/1
CAPSULE ORAL
Qty: 300 CAPSULE | Refills: 0 | Status: SHIPPED | OUTPATIENT
Start: 2021-05-14 | End: 2021-08-03

## 2021-05-14 NOTE — TELEPHONE ENCOUNTER
From: Viviane Hammond  To: Jag Tadeo DO  Sent: 5/14/2021 11:48 AM CDT  Subject: Prescription Question    Good morning. Why are you denying my prescription refill for Gabapentin? Do I need to make an appt?

## 2021-05-14 NOTE — TELEPHONE ENCOUNTER
From: Adrian Lockhart  To: Daniele Chiu DO  Sent: 5/14/2021 12:03 PM CDT  Subject: Prescription Question    I apologize. I did not have the # saved so I didn't answer. I called for an appt but I cannot get in until 7/26/21.

## 2021-05-14 NOTE — TELEPHONE ENCOUNTER
Attempted to reach patient again and no answer/ no VM. Refill request denied as it has been 17 months since LOV.

## 2021-07-14 ENCOUNTER — PATIENT MESSAGE (OUTPATIENT)
Dept: FAMILY MEDICINE CLINIC | Facility: CLINIC | Age: 56
End: 2021-07-14

## 2021-07-15 NOTE — TELEPHONE ENCOUNTER
From: Oneyda Motley  To: Marli Pino DO  Sent: 7/14/2021 10:38 PM CDT  Subject: Referral Request    I have an appt with Dr. Gurmeet Ramiers on Mon July 26th.    She needs a referral for the appointment and she's also going to be doing carpal tunnel test on m

## 2021-07-19 DIAGNOSIS — G56.01 CARPAL TUNNEL SYNDROME ON RIGHT: Primary | ICD-10-CM

## 2021-07-26 ENCOUNTER — PROCEDURE VISIT (OUTPATIENT)
Dept: NEUROLOGY | Facility: CLINIC | Age: 56
End: 2021-07-26

## 2021-07-26 DIAGNOSIS — R06.83 SNORING: ICD-10-CM

## 2021-07-26 DIAGNOSIS — R20.2 NUMBNESS AND TINGLING IN RIGHT HAND: ICD-10-CM

## 2021-07-26 DIAGNOSIS — R41.3 SHORT-TERM MEMORY LOSS: Primary | ICD-10-CM

## 2021-07-26 DIAGNOSIS — R20.0 NUMBNESS AND TINGLING IN RIGHT HAND: ICD-10-CM

## 2021-07-26 DIAGNOSIS — M79.641 RIGHT HAND PAIN: ICD-10-CM

## 2021-07-26 PROCEDURE — 95912 NRV CNDJ TEST 11-12 STUDIES: CPT | Performed by: OTHER

## 2021-07-26 PROCEDURE — 99214 OFFICE O/P EST MOD 30 MIN: CPT | Performed by: OTHER

## 2021-07-26 PROCEDURE — 95886 MUSC TEST DONE W/N TEST COMP: CPT | Performed by: OTHER

## 2021-07-26 NOTE — PROGRESS NOTES
[facility]   Neurology-    Delonte Hdez Patient Status:  No patient class for patient encounter    1965 MRN [id number]   Location [address] [place] [address], [address], [address] [place] [address] PCP Joo Somers DO Tremors are usually when holding a cup or stirring. She often (last 6 months) notices a hoarse voice intermittently for past few months. It can occur at the end of the day, and last 1-2 days. Sometimes she has general bilateral blurry vision.  She has occas herniation, abnormal enhancement pattern. Mild chronic ischemic white matter changes are present. EMG 3/2019  Conclusion:   1. This is a mildly abnormal study.   2. 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 history includes Breast Cancer (age of onset: 79) in her mother; Cancer in her paternal uncle; Cancer (age of onset: 79) in her mother. Father had essential tremor    Social History:   reports that she quit smoking about 6 years ago.  Her smoking use includ atrophy. Hearing was grossly intact. Shoulder shrug was normal. Neck flexion 4/5  Motor exam revealed normal muscle bulk and tone. No atrophy or fasciculations.  Manual muscle testing revealed 4/5 strength right APB, 5- on left APB, o/w 5/5  Deep tendon ref prescriptions requested or ordered in this encounter          We discussed in depth regarding the diagnosis, prognosis, treatment. The patient was given ample opportunity to ask questions. All questions and concerns were addressed.        Becky Lucas, DO

## 2021-07-26 NOTE — PROCEDURES
[facility]  Nerve Conduction & EMG Report                      Sondra Amber, [address]   EMG department   [facility] [address]  [address]  [place], [facility]  [phone]          Patient name: Pinky Blank  Date of

## 2021-08-03 DIAGNOSIS — G56.03 BILATERAL CARPAL TUNNEL SYNDROME: ICD-10-CM

## 2021-08-03 RX ORDER — GABAPENTIN 300 MG/1
CAPSULE ORAL
Qty: 360 CAPSULE | Refills: 0 | Status: SHIPPED | OUTPATIENT
Start: 2021-08-03 | End: 2021-11-03

## 2021-08-03 NOTE — TELEPHONE ENCOUNTER
Medication: GABAPENTIN 300 MG     Date of last refill: 5/14/21 (#300/0)  [place]     Last office visit: 7/26/2021  Due back to clinic per last office note:  4 months  Date next office visit scheduled:    Future Appointme

## 2021-08-05 DIAGNOSIS — R92.2 DENSE BREASTS: ICD-10-CM

## 2021-08-05 DIAGNOSIS — Z12.11 COLON CANCER SCREENING: ICD-10-CM

## 2021-08-05 DIAGNOSIS — Z12.31 ENCOUNTER FOR SCREENING MAMMOGRAM FOR BREAST CANCER: Primary | ICD-10-CM

## 2021-08-05 DIAGNOSIS — M79.646 THUMB PAIN, UNSPECIFIED LATERALITY: Primary | ICD-10-CM

## 2021-08-06 ENCOUNTER — OFFICE VISIT (OUTPATIENT)
Dept: SLEEP CENTER | Age: 56
End: 2021-08-06
Attending: Other
Payer: COMMERCIAL

## 2021-08-06 DIAGNOSIS — R41.3 SHORT-TERM MEMORY LOSS: ICD-10-CM

## 2021-08-06 DIAGNOSIS — R06.83 SNORING: ICD-10-CM

## 2021-08-06 PROCEDURE — 95810 POLYSOM 6/> YRS 4/> PARAM: CPT

## 2021-08-10 ENCOUNTER — TELEPHONE (OUTPATIENT)
Dept: FAMILY MEDICINE CLINIC | Facility: CLINIC | Age: 56
End: 2021-08-10

## 2021-08-10 DIAGNOSIS — Z12.31 ENCOUNTER FOR SCREENING MAMMOGRAM FOR BREAST CANCER: Primary | ICD-10-CM

## 2021-08-12 DIAGNOSIS — R41.3 SHORT-TERM MEMORY LOSS: Primary | ICD-10-CM

## 2021-08-26 ENCOUNTER — HOSPITAL ENCOUNTER (OUTPATIENT)
Dept: MAMMOGRAPHY | Facility: HOSPITAL | Age: 56
Discharge: HOME | End: 2021-08-26
Attending: FAMILY MEDICINE
Payer: COMMERCIAL

## 2021-08-26 ENCOUNTER — NURSE ONLY (OUTPATIENT)
Dept: ELECTROPHYSIOLOGY | Facility: HOSPITAL | Age: 56
End: 2021-08-26
Attending: Other
Payer: COMMERCIAL

## 2021-08-26 DIAGNOSIS — Z12.31 ENCOUNTER FOR SCREENING MAMMOGRAM FOR MALIGNANT NEOPLASM OF BREAST: ICD-10-CM

## 2021-08-26 DIAGNOSIS — R06.83 SNORING: ICD-10-CM

## 2021-08-26 DIAGNOSIS — Z12.31 ENCOUNTER FOR SCREENING MAMMOGRAM FOR BREAST CANCER: ICD-10-CM

## 2021-08-26 DIAGNOSIS — R41.3 SHORT-TERM MEMORY LOSS: ICD-10-CM

## 2021-08-26 PROCEDURE — 77067 SCR MAMMO BI INCL CAD: CPT | Performed by: FAMILY MEDICINE

## 2021-08-26 PROCEDURE — 95819 EEG AWAKE AND ASLEEP: CPT | Performed by: OTHER

## 2021-08-26 PROCEDURE — 77063 BREAST TOMOSYNTHESIS BI: CPT | Performed by: FAMILY MEDICINE

## 2021-08-26 NOTE — PROCEDURES
ELECTROENCEPHALOGRAM REPORT      Patient Name: Avani Lin   : 1965   Requesting Physician: Dr. Chantel Garcia   Date of Test: 2021   History: 55year old female with short term memory loss.     TECHNICAL ASPECTS OF EEG RECORDING:  This is

## 2021-10-04 ENCOUNTER — TELEPHONE (OUTPATIENT)
Dept: FAMILY MEDICINE CLINIC | Facility: CLINIC | Age: 56
End: 2021-10-04

## 2021-10-04 NOTE — TELEPHONE ENCOUNTER
FYI:    PT HAS NURSE APT ON 10/23 FOR FLU AND SHINGLES VACCINE.     PLEASE PLACE ORDERS     THANK YOU

## 2021-10-23 ENCOUNTER — NURSE ONLY (OUTPATIENT)
Dept: FAMILY MEDICINE CLINIC | Facility: CLINIC | Age: 56
End: 2021-10-23

## 2021-10-23 PROCEDURE — 90686 IIV4 VACC NO PRSV 0.5 ML IM: CPT | Performed by: FAMILY MEDICINE

## 2021-10-23 PROCEDURE — 90472 IMMUNIZATION ADMIN EACH ADD: CPT | Performed by: FAMILY MEDICINE

## 2021-10-23 PROCEDURE — 90750 HZV VACC RECOMBINANT IM: CPT | Performed by: FAMILY MEDICINE

## 2021-10-23 PROCEDURE — 90471 IMMUNIZATION ADMIN: CPT | Performed by: FAMILY MEDICINE

## 2021-10-23 NOTE — PROGRESS NOTES
Gave flu shot in the left deltoid. Gave 1st Shingrix in the right deltoid        Pt tolerated vaccines and left in stable condition. VIS sheet given with next dates for 2nd Shingrix vaccine.

## 2021-11-02 DIAGNOSIS — G56.03 BILATERAL CARPAL TUNNEL SYNDROME: ICD-10-CM

## 2021-11-02 NOTE — TELEPHONE ENCOUNTER
Medication: GABAPENTIN 300 MG Oral Cap     Date of last refill: 08/03/2021 (#360/0)  [place]     Last office visit: 07/26/2021  Due back to clinic per last office note:  Around 11/26/2021  Date next office visit sche

## 2021-11-03 RX ORDER — GABAPENTIN 300 MG/1
CAPSULE ORAL
Qty: 360 CAPSULE | Refills: 0 | Status: SHIPPED | OUTPATIENT
Start: 2021-11-03

## 2021-11-13 ENCOUNTER — LAB ENCOUNTER (OUTPATIENT)
Dept: LAB | Age: 56
End: 2021-11-13
Attending: INTERNAL MEDICINE
Payer: COMMERCIAL

## 2021-11-13 DIAGNOSIS — Z01.818 PRE-OP TESTING: ICD-10-CM

## 2021-12-02 ENCOUNTER — TELEPHONE (OUTPATIENT)
Dept: FAMILY MEDICINE CLINIC | Facility: CLINIC | Age: 56
End: 2021-12-02

## 2021-12-02 NOTE — TELEPHONE ENCOUNTER
PT IS COMING IN   Future Appointments   Date Time Provider Irvin Dela Cruz   1/15/2022  8:30 AM [facility] NURSE [facility]   3/15/2022  9:15 AM Jose Mancuso DO [facility] [place]     FOR #2 SHINGLES SHOT

## 2021-12-20 NOTE — TELEPHONE ENCOUNTER
[facility] ambulatory encounter  FAMILY PRACTICE OFFICE VISIT    CHIEF COMPLAINT:    Chief Complaint   Patient presents with   • Establish Care       SUBJECTIVE:  Mayda Dougherty is a 29 year old female who presented requesting evaluation for multiple concerns.     Problem   Anxiety and Depression   Other Chest Pain    12/22/21:  Patient reports chest pain in the center of her chest.  Describes the pain as either burning or feeling like something is trapped.  The chest pain has been going on for 2 months.  States that the pain occurs every day.  There is no specific time of day that the pain occurs.  Pain is not associated with eating.  Pain will last for 20-30 minutes at a time.  Pain occurs both at rest and with movement.  No aggravating or alleviating factors.  States that she will try to take deep breaths when the pain comes on to alleviate the pain.  She does have a history of reflux and eats fried foods and spicy foods.  States that she eats spicy foods about twice a week.  She works as a delivery jogger.  Denies throbbing.   No aggravating or alleviating factors. States that she has a premature one year old at home and gets stress from that. Reports not getting enough sleep.      Lymph Nodes Enlarged    12/22/21:  Patient states that she has been having palpable lymph nodes for years.  States that she gets some on and off.  Located at on her neck, under arms, groin.  States that they pop up without any URI symptoms however does report having morning congestion.  She recently went to the ED in November for the lymph node under her chin and was prescribed Keflex.  States that that lymph node has gone down in size but is still present.  She currently has tender lymph nodes on the right side of her neck back came up 3-4 days ago.  Additionally has been having night sweats.  The night sweats have been going on for about 4 months.  States that sometimes she will sweat through her pajamas and she needs to sleep with a  WAS ADV THAT PT HAS SCHEDULED A DIAGNOSTIC MAMMOGRAM SCREENING. WAS ADV THAT THE ORDER WAS PLACED WRONG.     PLEASE CHANGE ORDER     THANK YOU fan on.      New Onset of Headaches    12/22/21:  Patient states that she has been getting headaches for 2 weeks.  Headaches have been going on every day.  When the headaches come on they will last for 2 hours.  Pain of headaches varies from mild to intense.  Describes the pain as a throbbing sensation.  States that headaches can either be frontal, on the side, or posterior portion of the head.  States that she sometimes will get headache upon waking up.  Headaches have also been waking her from sleep.  She has tried Tylenol ibuprofen for the pain.  Tylenol will sometimes relieve it.  She tries to avoid ibuprofen due to her stomach.  When the headaches come on she will sit in a quiet room.  Prior to the headache she will have some vision changes which she describes as things going foggy and seeing the lytes brighter.  Denies history of migraines.  Denies phonophobia.     Establishing Care With New Doctor, Encounter for    Per chart review, patient had previously been seen by OB, one PCP visit in 2016.            Patient chart reviewed.  Patient has not been seen at [facility] before.   Patient recently seen in the ED on 11/12/21, for \"knot\" under her chin.  Patient was provided with a prescription for Keflex to cover for infectious etiology of the palpable nodule.  Was empirically treated for GC/Chlamydia with 500mg Rocephin and 1g Azithromycin.       PROBLEM LIST:   Patient Active Problem List   Diagnosis   • Anemia, postpartum   • Request for sterilization   • Cervical cancer screening   • Anxiety and depression   • Other chest pain   • Lymph nodes enlarged   • New onset of headaches   • Establishing care with new doctor, encounter for       MEDICATIONS:   ferrous sulfate 325 (65 FE) MG tablet    No current facility-administered medications on file prior to visit.      PAST HISTORIES:   I have reviewed the past medical history, family history, social history, medications and allergies listed in the medical record as  obtained by my nursing staff and support staff and agree with their documentation.     OBJECTIVE:  PHYSICAL EXAM:   Vital Signs:    Visit Vitals  /68 (BP Location: LUE - Left upper extremity, Patient Position: Sitting, Cuff Size: Regular)   Pulse 80   Temp 97.9 °F (36.6 °C) (Tympanic)   Resp 16   Ht 5' 6\" (1.676 m)   Wt 60 kg (132 lb 4.8 oz)   LMP 12/12/2021 (Exact Date)   SpO2 99%   Breastfeeding No   BMI 21.35 kg/m²     Pulse Ox Interpretation:  Within normal limits.  General:   Alert, cooperative, conversive in no acute distress.  Skin:  Multiple circular areas approximately 1cm in diameter of hyperpigmentation on face and arms and small approximately 1cm or smaller circular patches of skin that have been picked off on face   Head:  Normocephalic, atraumatic.   Neck:  Trachea is midline.     Eyes:  Normal conjunctivae and sclerae. PERRLA. EOMI   ENT:  Mucous membranes are moist.   Cardiovascular:  Radial pulse 2+.  Regular rate and rhythm without murmur.  Tenderness to palpation of anterior chest wall bilaterally  Respiratory:   Normal respiratory effort.  Clear to auscultation.  No wheezes, rales or rhonchi.  Gastrointestinal:  Soft and nontender.  Normal bowel sounds.  No hepatomegaly or splenomegaly.   Musculoskeletal:  No edema.   Lymphatic: Two, tender mobile <1cm palpable lymph nodes on R anterior cervical chain. One, tender mobile <1cm lymph node on the L posterior cervical chain.  One <1cm, tender mobile submental lymph node.  One tender and one nontender 1cm L axillary lymph nodes.  One tender 1cm axillary lymph node.  One <1cm tender, mobile L inguinal lymph node.  One <1cm tender, mobile R inguinal lymph node.    Neurologic:  CN II-XII intact.  Finger to nose normal.  No dysdiadochokinesia.  No pronator drift.  Gait normal.    Psychiatric:  Cooperative.  Appropriate mood and affect.    LAB RESULTS:   All pertinent laboratory results were reviewed.    ASSESSMENT AND PLAN:   1. Establishing care  with new doctor, encounter for  Assessment & Plan:  · Patient has additional concerns of reflux, hemorrhoids, anxiety that were unable to be thoroughly discussed at this visit.  Patient ok with scheduling additional visit to discuss these.    · Follow up visit scheduled for 1/6/2022.     2. Other chest pain  Assessment & Plan:  · Differential includes cardiac origin, GERD, anemia, musculoskeletal pain, anxiety  · EKG performed and noted sinus bradycardia.  Cardiac origin of chest pain low on differential.  · Patient does have a history of acid reflux and is eating foods that aggravate her reflux.  GERD is leading differential for her chest pain.  Discussed lifestyle modifications.  Prescribed Pepcid.  · Patient does work as a delivery worker, so musculoskeletal pain on differential.  On exam tenderness to palpation of chest wall, however the chest pain is not reproducible.  This is less likely the cause of her pain.  · Patient does have a history of anemia.  CBC ordered.  Orders:  -     CBC WITH DIFFERENTIAL; Future  -     famotidine (Pepcid) 20 MG tablet; Take 1 tablet by mouth 2 times daily.  Dispense: 60 tablet; Refill: 4  -     Electrocardiogram 12-Lead; Standing  -     Electrocardiogram 12-Lead  3. Lymph nodes enlarged  Assessment & Plan:  · Multiple mobile pea-sized nodules present in anterior cervical chain, posterior cervical chain, axillary, inguinal regions  · Given that the majority of these nodes are tender, likely infectious cause.  Ordered ESR, CRP.  · Cannot rule out lymphoma, leukemia.  Workup with CBC.  · Consider US vs biopsy for future workup  Orders:  -     CBC WITH DIFFERENTIAL; Future  -     SEDIMENTATION RATE WESTERGREN; Future  -     C REACTIVE PROTEIN; Future  4. New onset of headaches  Assessment & Plan:  · Due to red flag symptoms of headache waking up from sleep in headache in the morning upon waking up, will order CT head to rule out intracranial process.  · Continue Tylenol for  supportive care.  Orders:  -     CT HEAD W WO CONTRAST; Future        Health Maintenance Due   Topic Date Due   • COVID-19 Vaccine (1) Never done   • Influenza Vaccine (1) 09/01/2021       Return in about 2 weeks (around 1/5/2022).  Will call patient or patient's representative with lab results.      This patient was discussed with attending, Dr. East.      Judy Baron,    PGY-II Family Medicine Residency   12/22/21

## 2022-01-15 ENCOUNTER — NURSE ONLY (OUTPATIENT)
Dept: FAMILY MEDICINE CLINIC | Facility: CLINIC | Age: 57
End: 2022-01-15
Payer: COMMERCIAL

## 2022-01-15 PROCEDURE — 90471 IMMUNIZATION ADMIN: CPT | Performed by: FAMILY MEDICINE

## 2022-01-15 PROCEDURE — 90750 HZV VACC RECOMBINANT IM: CPT | Performed by: FAMILY MEDICINE

## 2022-01-15 NOTE — PROGRESS NOTES
Patient to clinic for second shingrix  VIS provided    Patient received shingrix IM right deltoid  Left office in stable condtion

## 2022-03-05 ENCOUNTER — OFFICE VISIT (OUTPATIENT)
Dept: FAMILY MEDICINE CLINIC | Facility: CLINIC | Age: 57
End: 2022-03-05
Payer: COMMERCIAL

## 2022-03-05 VITALS
HEIGHT: 69 IN | HEART RATE: 71 BPM | BODY MASS INDEX: 27.12 KG/M2 | DIASTOLIC BLOOD PRESSURE: 68 MMHG | RESPIRATION RATE: 16 BRPM | TEMPERATURE: 97 F | OXYGEN SATURATION: 99 % | SYSTOLIC BLOOD PRESSURE: 102 MMHG | WEIGHT: 183.13 LBS

## 2022-03-05 DIAGNOSIS — Z00.00 ROUTINE HEALTH MAINTENANCE: Primary | ICD-10-CM

## 2022-03-05 DIAGNOSIS — D22.62 ATYPICAL NEVUS OF LEFT UPPER ARM: ICD-10-CM

## 2022-03-05 LAB
ALBUMIN SERPL-MCNC: 3.7 G/DL (ref 3.4–5)
ALBUMIN/GLOB SERPL: 1.3 {RATIO} (ref 1–2)
ALP LIVER SERPL-CCNC: 98 U/L
ANION GAP SERPL CALC-SCNC: 3 MMOL/L (ref 0–18)
AST SERPL-CCNC: 24 U/L (ref 15–37)
BASOPHILS # BLD AUTO: 0.03 X10(3) UL (ref 0–0.2)
BASOPHILS NFR BLD AUTO: 0.5 %
BILIRUB SERPL-MCNC: 1 MG/DL (ref 0.1–2)
BUN BLD-MCNC: 15 MG/DL (ref 7–18)
CALCIUM BLD-MCNC: 9.4 MG/DL (ref 8.5–10.1)
CHLORIDE SERPL-SCNC: 107 MMOL/L (ref 98–112)
CHOLEST SERPL-MCNC: 176 MG/DL (ref ?–200)
CO2 SERPL-SCNC: 32 MMOL/L (ref 21–32)
CREAT BLD-MCNC: 0.89 MG/DL
EOSINOPHIL # BLD AUTO: 0.1 X10(3) UL (ref 0–0.7)
EOSINOPHIL NFR BLD AUTO: 1.6 %
ERYTHROCYTE [DISTWIDTH] IN BLOOD BY AUTOMATED COUNT: 12.7 %
FASTING PATIENT LIPID ANSWER: YES
GLOBULIN PLAS-MCNC: 2.8 G/DL (ref 2.8–4.4)
GLUCOSE BLD-MCNC: 88 MG/DL (ref 70–99)
HCT VFR BLD AUTO: 45.7 %
HDLC SERPL-MCNC: 75 MG/DL (ref 40–59)
HGB BLD-MCNC: 14.8 G/DL
IMM GRANULOCYTES # BLD AUTO: 0.02 X10(3) UL (ref 0–1)
IMM GRANULOCYTES NFR BLD: 0.3 %
LDLC SERPL CALC-MCNC: 90 MG/DL (ref ?–100)
LYMPHOCYTES # BLD AUTO: 1.75 X10(3) UL (ref 1–4)
LYMPHOCYTES NFR BLD AUTO: 27.1 %
MCH RBC QN AUTO: 28.4 PG (ref 26–34)
MCHC RBC AUTO-ENTMCNC: 32.4 G/DL (ref 31–37)
MCV RBC AUTO: 87.7 FL
MONOCYTES # BLD AUTO: 0.57 X10(3) UL (ref 0.1–1)
MONOCYTES NFR BLD AUTO: 8.8 %
NEUTROPHILS # BLD AUTO: 3.98 X10 (3) UL (ref 1.5–7.7)
NEUTROPHILS # BLD AUTO: 3.98 X10(3) UL (ref 1.5–7.7)
NEUTROPHILS NFR BLD AUTO: 61.7 %
NONHDLC SERPL-MCNC: 101 MG/DL (ref ?–130)
OSMOLALITY SERPL CALC.SUM OF ELEC: 294 MOSM/KG (ref 275–295)
PLATELET # BLD AUTO: 260 10(3)UL (ref 150–450)
POTASSIUM SERPL-SCNC: 4.7 MMOL/L (ref 3.5–5.1)
PROT SERPL-MCNC: 6.5 G/DL (ref 6.4–8.2)
RBC # BLD AUTO: 5.21 X10(6)UL
SODIUM SERPL-SCNC: 142 MMOL/L (ref 136–145)
TRIGL SERPL-MCNC: 53 MG/DL (ref 30–149)
TSI SER-ACNC: 2.48 MIU/ML (ref 0.36–3.74)
VLDLC SERPL CALC-MCNC: 9 MG/DL (ref 0–30)
WBC # BLD AUTO: 6.5 X10(3) UL (ref 4–11)

## 2022-03-05 PROCEDURE — 3078F DIAST BP <80 MM HG: CPT | Performed by: FAMILY MEDICINE

## 2022-03-05 PROCEDURE — 84443 ASSAY THYROID STIM HORMONE: CPT | Performed by: FAMILY MEDICINE

## 2022-03-05 PROCEDURE — 85025 COMPLETE CBC W/AUTO DIFF WBC: CPT | Performed by: FAMILY MEDICINE

## 2022-03-05 PROCEDURE — 80053 COMPREHEN METABOLIC PANEL: CPT | Performed by: FAMILY MEDICINE

## 2022-03-05 PROCEDURE — 99396 PREV VISIT EST AGE 40-64: CPT | Performed by: FAMILY MEDICINE

## 2022-03-05 PROCEDURE — 3074F SYST BP LT 130 MM HG: CPT | Performed by: FAMILY MEDICINE

## 2022-03-05 PROCEDURE — 80061 LIPID PANEL: CPT | Performed by: FAMILY MEDICINE

## 2022-03-05 PROCEDURE — 3008F BODY MASS INDEX DOCD: CPT | Performed by: FAMILY MEDICINE

## 2022-03-07 ENCOUNTER — TELEPHONE (OUTPATIENT)
Dept: FAMILY MEDICINE CLINIC | Facility: CLINIC | Age: 57
End: 2022-03-07

## 2022-03-07 NOTE — TELEPHONE ENCOUNTER
----- Message from Marta Jackson DO sent at 3/7/2022  8:23 AM CST -----  Notify Micheline Solis  labs looked very good lipids were  Excellent kidney, liver function, blood sugar and thyroid were all normal.  She is not anemic, her thyroid function looks great.  I really think it s a combination of the extra hours she is working and maybe the diuretic, I asked her to cut it to 3x per week, and see if that helps

## 2022-03-15 ENCOUNTER — TELEPHONE (OUTPATIENT)
Dept: FAMILY MEDICINE CLINIC | Facility: CLINIC | Age: 57
End: 2022-03-15

## 2022-03-15 ENCOUNTER — OFFICE VISIT (OUTPATIENT)
Dept: NEUROLOGY | Facility: CLINIC | Age: 57
End: 2022-03-15
Payer: COMMERCIAL

## 2022-03-15 VITALS
SYSTOLIC BLOOD PRESSURE: 128 MMHG | RESPIRATION RATE: 16 BRPM | BODY MASS INDEX: 27 KG/M2 | WEIGHT: 182 LBS | DIASTOLIC BLOOD PRESSURE: 78 MMHG | HEART RATE: 60 BPM

## 2022-03-15 DIAGNOSIS — G56.03 BILATERAL CARPAL TUNNEL SYNDROME: ICD-10-CM

## 2022-03-15 DIAGNOSIS — G25.0 ESSENTIAL TREMOR: ICD-10-CM

## 2022-03-15 DIAGNOSIS — R47.89 WORD FINDING DIFFICULTY: ICD-10-CM

## 2022-03-15 DIAGNOSIS — G56.01 RIGHT CARPAL TUNNEL SYNDROME: Primary | ICD-10-CM

## 2022-03-15 PROCEDURE — 3074F SYST BP LT 130 MM HG: CPT | Performed by: OTHER

## 2022-03-15 PROCEDURE — 3078F DIAST BP <80 MM HG: CPT | Performed by: OTHER

## 2022-03-15 PROCEDURE — 99214 OFFICE O/P EST MOD 30 MIN: CPT | Performed by: OTHER

## 2022-03-15 RX ORDER — GABAPENTIN 300 MG/1
CAPSULE ORAL
Qty: 360 CAPSULE | Refills: 1 | Status: SHIPPED | OUTPATIENT
Start: 2022-03-15

## 2022-03-15 NOTE — TELEPHONE ENCOUNTER
DR CURIEL'S OFFICE CALLED AND ADV THAT PT HAS AN APT TODAY AND NEEDS NEW REFERRAL PLACED.     PLEASE PLACE REFERRAL       THANK YOU

## 2022-03-15 NOTE — PROGRESS NOTES
Patient states tremors are controlled. Patient states a spot of blurry vision on and off. Denies head trauma.

## 2022-05-02 RX ORDER — TRIAMTERENE AND HYDROCHLOROTHIAZIDE 75; 50 MG/1; MG/1
0.5 TABLET ORAL DAILY
Qty: 135 TABLET | Refills: 2 | Status: SHIPPED | OUTPATIENT
Start: 2022-05-02

## 2022-05-10 ENCOUNTER — OFFICE VISIT (OUTPATIENT)
Dept: FAMILY MEDICINE CLINIC | Facility: CLINIC | Age: 57
End: 2022-05-10
Payer: COMMERCIAL

## 2022-05-10 VITALS
TEMPERATURE: 99 F | SYSTOLIC BLOOD PRESSURE: 102 MMHG | RESPIRATION RATE: 12 BRPM | DIASTOLIC BLOOD PRESSURE: 70 MMHG | BODY MASS INDEX: 28 KG/M2 | HEART RATE: 64 BPM | WEIGHT: 188 LBS

## 2022-05-10 DIAGNOSIS — R23.2 HOT FLASHES: ICD-10-CM

## 2022-05-10 DIAGNOSIS — R41.3 SHORT-TERM MEMORY LOSS: ICD-10-CM

## 2022-05-10 DIAGNOSIS — R68.82 DECREASED LIBIDO: ICD-10-CM

## 2022-05-10 DIAGNOSIS — N95.1 INSOMNIA ASSOCIATED WITH MENOPAUSE: Primary | ICD-10-CM

## 2022-05-10 PROCEDURE — 3078F DIAST BP <80 MM HG: CPT | Performed by: FAMILY MEDICINE

## 2022-05-10 PROCEDURE — 3074F SYST BP LT 130 MM HG: CPT | Performed by: FAMILY MEDICINE

## 2022-05-10 PROCEDURE — 99214 OFFICE O/P EST MOD 30 MIN: CPT | Performed by: FAMILY MEDICINE

## 2022-05-10 RX ORDER — FLUOXETINE 10 MG/1
10 CAPSULE ORAL NIGHTLY
Qty: 30 CAPSULE | Refills: 1 | Status: SHIPPED | OUTPATIENT
Start: 2022-05-10 | End: 2022-06-09

## 2022-05-16 ENCOUNTER — PATIENT MESSAGE (OUTPATIENT)
Dept: FAMILY MEDICINE CLINIC | Facility: CLINIC | Age: 57
End: 2022-05-16

## 2022-05-16 NOTE — TELEPHONE ENCOUNTER
From: Jin Castle  To: Danette Landau, DO  Sent: 5/16/2022 8:19 AM CDT  Subject: Wrist/Shoulder    Morning. I fell Saturday and landed on both hands/wrists. Today my palm on left hand is still swollen, I cannot put weight on my wrist or flex it up/down with pain. I have full use of my fingers and thumb. My shoulder has burning sensation and when I life my arm it is painful but more importantly I lose feeling in my fingers. I iced wrist and have been taking Advil - which does provide some relief. Should I continue this or do you think I should seek care? Unfortunately working at [employer] I need to life and carry heavy items but today I have called in. I work again Wednesday and Thursday so I want to take correct action. Thank you.

## 2022-05-23 ENCOUNTER — PATIENT MESSAGE (OUTPATIENT)
Dept: FAMILY MEDICINE CLINIC | Facility: CLINIC | Age: 57
End: 2022-05-23

## 2022-05-31 ENCOUNTER — OFFICE VISIT (OUTPATIENT)
Dept: FAMILY MEDICINE CLINIC | Facility: CLINIC | Age: 57
End: 2022-05-31
Payer: COMMERCIAL

## 2022-05-31 VITALS
DIASTOLIC BLOOD PRESSURE: 70 MMHG | WEIGHT: 190.38 LBS | SYSTOLIC BLOOD PRESSURE: 128 MMHG | OXYGEN SATURATION: 98 % | HEART RATE: 68 BPM | TEMPERATURE: 97 F | BODY MASS INDEX: 28.2 KG/M2 | HEIGHT: 69 IN

## 2022-05-31 DIAGNOSIS — Z86.39 HISTORY OF OBESITY IN ADULTHOOD: ICD-10-CM

## 2022-05-31 DIAGNOSIS — R68.82 DECREASED LIBIDO: Primary | ICD-10-CM

## 2022-05-31 DIAGNOSIS — G89.29 CHRONIC LEFT SHOULDER PAIN: ICD-10-CM

## 2022-05-31 DIAGNOSIS — M25.512 CHRONIC LEFT SHOULDER PAIN: ICD-10-CM

## 2022-05-31 PROCEDURE — 3078F DIAST BP <80 MM HG: CPT | Performed by: FAMILY MEDICINE

## 2022-05-31 PROCEDURE — 3074F SYST BP LT 130 MM HG: CPT | Performed by: FAMILY MEDICINE

## 2022-05-31 PROCEDURE — 3008F BODY MASS INDEX DOCD: CPT | Performed by: FAMILY MEDICINE

## 2022-05-31 PROCEDURE — 99214 OFFICE O/P EST MOD 30 MIN: CPT | Performed by: FAMILY MEDICINE

## 2022-06-15 ENCOUNTER — TELEPHONE (OUTPATIENT)
Dept: ORTHOPEDICS CLINIC | Facility: CLINIC | Age: 57
End: 2022-06-15

## 2022-06-15 ENCOUNTER — HOSPITAL ENCOUNTER (OUTPATIENT)
Dept: GENERAL RADIOLOGY | Age: 57
Discharge: HOME | End: 2022-06-15
Attending: ORTHOPAEDIC SURGERY
Payer: COMMERCIAL

## 2022-06-15 ENCOUNTER — OFFICE VISIT (OUTPATIENT)
Dept: ORTHOPEDICS CLINIC | Facility: CLINIC | Age: 57
End: 2022-06-15
Payer: COMMERCIAL

## 2022-06-15 DIAGNOSIS — S46.002A INJURY OF LEFT ROTATOR CUFF, INITIAL ENCOUNTER: Primary | ICD-10-CM

## 2022-06-15 DIAGNOSIS — M25.512 LEFT SHOULDER PAIN, UNSPECIFIED CHRONICITY: Primary | ICD-10-CM

## 2022-06-15 DIAGNOSIS — M25.512 LEFT SHOULDER PAIN, UNSPECIFIED CHRONICITY: ICD-10-CM

## 2022-06-15 PROCEDURE — 99214 OFFICE O/P EST MOD 30 MIN: CPT | Performed by: ORTHOPAEDIC SURGERY

## 2022-06-15 PROCEDURE — 73030 X-RAY EXAM OF SHOULDER: CPT | Performed by: ORTHOPAEDIC SURGERY

## 2022-06-17 ENCOUNTER — PATIENT MESSAGE (OUTPATIENT)
Dept: FAMILY MEDICINE CLINIC | Facility: CLINIC | Age: 57
End: 2022-06-17

## 2022-06-17 DIAGNOSIS — F41.8 TEST ANXIETY: ICD-10-CM

## 2022-06-17 RX ORDER — DIAZEPAM 5 MG/1
TABLET ORAL
Qty: 2 TABLET | Refills: 0 | Status: SHIPPED | OUTPATIENT
Start: 2022-06-17

## 2022-06-17 NOTE — TELEPHONE ENCOUNTER
From: Svitlana Route  To: Ruy Abdi DO  Sent: 6/17/2022 8:27 AM CDT  Subject: MRI    My shoulder MRI is scheduled for 6/23. Since I am claustrophobic they told me to reach out to you for something I can take before I go in.

## 2022-06-23 ENCOUNTER — HOSPITAL ENCOUNTER (OUTPATIENT)
Dept: MRI IMAGING | Age: 57
Discharge: HOME | End: 2022-06-23
Attending: ORTHOPAEDIC SURGERY
Payer: COMMERCIAL

## 2022-06-23 DIAGNOSIS — S46.002A INJURY OF LEFT ROTATOR CUFF, INITIAL ENCOUNTER: ICD-10-CM

## 2022-06-23 PROCEDURE — 73221 MRI JOINT UPR EXTREM W/O DYE: CPT | Performed by: ORTHOPAEDIC SURGERY

## 2022-06-27 ENCOUNTER — TELEPHONE (OUTPATIENT)
Dept: ORTHOPEDICS CLINIC | Facility: CLINIC | Age: 57
End: 2022-06-27

## 2022-06-27 ENCOUNTER — PATIENT MESSAGE (OUTPATIENT)
Dept: ORTHOPEDICS CLINIC | Facility: CLINIC | Age: 57
End: 2022-06-27

## 2022-06-27 NOTE — TELEPHONE ENCOUNTER
From: Vern Rhodes  To: Tang Tanner MD  Sent: 6/27/2022 7:55 AM CDT  Subject: MRI Result    Good morning. I am certainly happy to hear your conclusion of the MRI results. Do you want me to make an appointment to come see you? I am not sure what other options there are in getting some relief as it has really revved up its A game as far as pain lately. Do you think the shoulder issue could be causing my lower palm and wrist pain as well or maybe its the other way around? ? I landed on the palm of my hand when I fell, triggering all of this.

## 2022-06-29 ENCOUNTER — PATIENT MESSAGE (OUTPATIENT)
Dept: FAMILY MEDICINE CLINIC | Facility: CLINIC | Age: 57
End: 2022-06-29

## 2022-06-29 DIAGNOSIS — M25.531 WRIST PAIN, RIGHT: ICD-10-CM

## 2022-06-29 DIAGNOSIS — G56.01 CARPAL TUNNEL SYNDROME ON RIGHT: Primary | ICD-10-CM

## 2022-06-30 ENCOUNTER — TELEPHONE (OUTPATIENT)
Dept: ORTHOPEDICS CLINIC | Facility: CLINIC | Age: 57
End: 2022-06-30

## 2022-06-30 DIAGNOSIS — M25.531 RIGHT WRIST PAIN: Primary | ICD-10-CM

## 2022-07-11 ENCOUNTER — OFFICE VISIT (OUTPATIENT)
Dept: ORTHOPEDICS CLINIC | Facility: CLINIC | Age: 57
End: 2022-07-11
Payer: COMMERCIAL

## 2022-07-11 VITALS — WEIGHT: 190 LBS | BODY MASS INDEX: 28.14 KG/M2 | HEIGHT: 69 IN

## 2022-07-11 DIAGNOSIS — M75.02 ADHESIVE CAPSULITIS OF LEFT SHOULDER: Primary | ICD-10-CM

## 2022-07-11 PROCEDURE — 99214 OFFICE O/P EST MOD 30 MIN: CPT | Performed by: ORTHOPAEDIC SURGERY

## 2022-07-11 PROCEDURE — 3008F BODY MASS INDEX DOCD: CPT | Performed by: ORTHOPAEDIC SURGERY

## 2022-07-11 RX ORDER — MELOXICAM 15 MG/1
15 TABLET ORAL DAILY
Qty: 14 TABLET | Refills: 1 | Status: SHIPPED | OUTPATIENT
Start: 2022-07-11

## 2022-07-14 ENCOUNTER — OFFICE VISIT (OUTPATIENT)
Dept: ORTHOPEDICS CLINIC | Facility: CLINIC | Age: 57
End: 2022-07-14
Payer: COMMERCIAL

## 2022-07-14 ENCOUNTER — APPOINTMENT (OUTPATIENT)
Dept: GENERAL RADIOLOGY | Age: 57
End: 2022-07-14
Attending: PHYSICIAN ASSISTANT
Payer: COMMERCIAL

## 2022-07-14 ENCOUNTER — HOSPITAL ENCOUNTER (OUTPATIENT)
Dept: GENERAL RADIOLOGY | Age: 57
Discharge: HOME | End: 2022-07-14
Attending: PHYSICIAN ASSISTANT
Payer: COMMERCIAL

## 2022-07-14 VITALS — BODY MASS INDEX: 27.4 KG/M2 | HEIGHT: 69 IN | WEIGHT: 185 LBS

## 2022-07-14 DIAGNOSIS — M25.531 RIGHT WRIST PAIN: ICD-10-CM

## 2022-07-14 DIAGNOSIS — M25.532 LEFT WRIST PAIN: ICD-10-CM

## 2022-07-14 DIAGNOSIS — M18.12 PRIMARY OSTEOARTHRITIS OF FIRST CARPOMETACARPAL JOINT OF LEFT HAND: Primary | ICD-10-CM

## 2022-07-14 PROCEDURE — 3008F BODY MASS INDEX DOCD: CPT | Performed by: PHYSICIAN ASSISTANT

## 2022-07-14 PROCEDURE — 20600 DRAIN/INJ JOINT/BURSA W/O US: CPT | Performed by: PHYSICIAN ASSISTANT

## 2022-07-14 PROCEDURE — 99213 OFFICE O/P EST LOW 20 MIN: CPT | Performed by: PHYSICIAN ASSISTANT

## 2022-07-14 PROCEDURE — 73110 X-RAY EXAM OF WRIST: CPT | Performed by: PHYSICIAN ASSISTANT

## 2022-07-14 RX ORDER — TRIAMCINOLONE ACETONIDE 40 MG/ML
40 INJECTION, SUSPENSION INTRA-ARTICULAR; INTRAMUSCULAR ONCE
Status: COMPLETED | OUTPATIENT
Start: 2022-07-14 | End: 2022-07-14

## 2022-07-14 RX ADMIN — TRIAMCINOLONE ACETONIDE 40 MG: 40 INJECTION, SUSPENSION INTRA-ARTICULAR; INTRAMUSCULAR at 14:58:00

## 2022-08-30 ENCOUNTER — LAB ENCOUNTER (OUTPATIENT)
Dept: LAB | Age: 57
End: 2022-08-30
Attending: INTERNAL MEDICINE
Payer: COMMERCIAL

## 2022-08-30 ENCOUNTER — OFFICE VISIT (OUTPATIENT)
Dept: INTERNAL MEDICINE CLINIC | Facility: CLINIC | Age: 57
End: 2022-08-30
Payer: COMMERCIAL

## 2022-08-30 VITALS
BODY MASS INDEX: 28.46 KG/M2 | RESPIRATION RATE: 16 BRPM | HEART RATE: 78 BPM | SYSTOLIC BLOOD PRESSURE: 120 MMHG | DIASTOLIC BLOOD PRESSURE: 76 MMHG | HEIGHT: 68.5 IN | WEIGHT: 190 LBS

## 2022-08-30 DIAGNOSIS — E04.2 MULTIPLE THYROID NODULES: ICD-10-CM

## 2022-08-30 DIAGNOSIS — R23.2 HOT FLASHES: ICD-10-CM

## 2022-08-30 DIAGNOSIS — E66.3 OVERWEIGHT (BMI 25.0-29.9): ICD-10-CM

## 2022-08-30 DIAGNOSIS — Z51.81 THERAPEUTIC DRUG MONITORING: Primary | ICD-10-CM

## 2022-08-30 DIAGNOSIS — N95.1 INSOMNIA ASSOCIATED WITH MENOPAUSE: ICD-10-CM

## 2022-08-30 DIAGNOSIS — Z51.81 THERAPEUTIC DRUG MONITORING: ICD-10-CM

## 2022-08-30 LAB
ALBUMIN SERPL-MCNC: 3.5 G/DL (ref 3.4–5)
ALBUMIN/GLOB SERPL: 1.1 {RATIO} (ref 1–2)
ALP LIVER SERPL-CCNC: 97 U/L
ALT SERPL-CCNC: 29 U/L
ANION GAP SERPL CALC-SCNC: 5 MMOL/L (ref 0–18)
AST SERPL-CCNC: 25 U/L (ref 15–37)
BILIRUB SERPL-MCNC: 0.7 MG/DL (ref 0.1–2)
BUN BLD-MCNC: 18 MG/DL (ref 7–18)
BUN/CREAT SERPL: 18 (ref 10–20)
CALCIUM BLD-MCNC: 9.5 MG/DL (ref 8.5–10.1)
CHLORIDE SERPL-SCNC: 104 MMOL/L (ref 98–112)
CO2 SERPL-SCNC: 30 MMOL/L (ref 21–32)
CREAT BLD-MCNC: 1 MG/DL
EST. AVERAGE GLUCOSE BLD GHB EST-MCNC: 97 MG/DL (ref 68–126)
FASTING STATUS PATIENT QL REPORTED: NO
FOLATE SERPL-MCNC: 12.1 NG/ML (ref 8.7–?)
GFR SERPLBLD BASED ON 1.73 SQ M-ARVRAT: 66 ML/MIN/1.73M2 (ref 60–?)
GLOBULIN PLAS-MCNC: 3.3 G/DL (ref 2.8–4.4)
GLUCOSE BLD-MCNC: 85 MG/DL (ref 70–99)
HBA1C MFR BLD: 5 % (ref ?–5.7)
INSULIN SERPL-ACNC: 4.9 MU/L (ref 3–25)
OSMOLALITY SERPL CALC.SUM OF ELEC: 289 MOSM/KG (ref 275–295)
POTASSIUM SERPL-SCNC: 3.4 MMOL/L (ref 3.5–5.1)
PROT SERPL-MCNC: 6.8 G/DL (ref 6.4–8.2)
SODIUM SERPL-SCNC: 139 MMOL/L (ref 136–145)
T4 FREE SERPL-MCNC: 1 NG/DL (ref 0.8–1.7)
THYROGLOB SERPL-MCNC: 218 U/ML (ref ?–60)
THYROPEROXIDASE AB SERPL-ACNC: 1094 U/ML (ref ?–60)
TSI SER-ACNC: 3.69 MIU/ML (ref 0.36–3.74)
VIT B12 SERPL-MCNC: 585 PG/ML (ref 193–986)
VIT D+METAB SERPL-MCNC: 42.9 NG/ML (ref 30–100)

## 2022-08-30 PROCEDURE — 82306 VITAMIN D 25 HYDROXY: CPT

## 2022-08-30 PROCEDURE — 82607 VITAMIN B-12: CPT

## 2022-08-30 PROCEDURE — 3074F SYST BP LT 130 MM HG: CPT | Performed by: INTERNAL MEDICINE

## 2022-08-30 PROCEDURE — 82746 ASSAY OF FOLIC ACID SERUM: CPT

## 2022-08-30 PROCEDURE — 86376 MICROSOMAL ANTIBODY EACH: CPT

## 2022-08-30 PROCEDURE — 86800 THYROGLOBULIN ANTIBODY: CPT

## 2022-08-30 PROCEDURE — 83036 HEMOGLOBIN GLYCOSYLATED A1C: CPT

## 2022-08-30 PROCEDURE — 3008F BODY MASS INDEX DOCD: CPT | Performed by: INTERNAL MEDICINE

## 2022-08-30 PROCEDURE — 36415 COLL VENOUS BLD VENIPUNCTURE: CPT

## 2022-08-30 PROCEDURE — 84439 ASSAY OF FREE THYROXINE: CPT

## 2022-08-30 PROCEDURE — 80053 COMPREHEN METABOLIC PANEL: CPT

## 2022-08-30 PROCEDURE — 83525 ASSAY OF INSULIN: CPT

## 2022-08-30 PROCEDURE — 84443 ASSAY THYROID STIM HORMONE: CPT

## 2022-08-30 PROCEDURE — 3078F DIAST BP <80 MM HG: CPT | Performed by: INTERNAL MEDICINE

## 2022-08-30 PROCEDURE — 99204 OFFICE O/P NEW MOD 45 MIN: CPT | Performed by: INTERNAL MEDICINE

## 2022-08-30 PROCEDURE — 93000 ELECTROCARDIOGRAM COMPLETE: CPT | Performed by: INTERNAL MEDICINE

## 2022-08-30 RX ORDER — PHENTERMINE HYDROCHLORIDE 37.5 MG/1
37.5 TABLET ORAL
Qty: 30 TABLET | Refills: 2 | Status: SHIPPED | OUTPATIENT
Start: 2022-08-30

## 2022-11-07 ENCOUNTER — IMMUNIZATION (OUTPATIENT)
Dept: FAMILY MEDICINE CLINIC | Facility: CLINIC | Age: 57
End: 2022-11-07
Payer: COMMERCIAL

## 2022-11-07 DIAGNOSIS — Z23 NEED FOR VACCINATION: Primary | ICD-10-CM

## 2022-12-01 ENCOUNTER — OFFICE VISIT (OUTPATIENT)
Dept: INTERNAL MEDICINE CLINIC | Facility: CLINIC | Age: 57
End: 2022-12-01
Payer: COMMERCIAL

## 2022-12-01 VITALS
HEIGHT: 68.5 IN | WEIGHT: 195 LBS | BODY MASS INDEX: 29.21 KG/M2 | RESPIRATION RATE: 16 BRPM | SYSTOLIC BLOOD PRESSURE: 138 MMHG | DIASTOLIC BLOOD PRESSURE: 80 MMHG | HEART RATE: 101 BPM

## 2022-12-01 DIAGNOSIS — Z51.81 THERAPEUTIC DRUG MONITORING: Primary | ICD-10-CM

## 2022-12-01 DIAGNOSIS — E66.09 CLASS 1 OBESITY DUE TO EXCESS CALORIES WITHOUT SERIOUS COMORBIDITY WITH BODY MASS INDEX (BMI) OF 30.0 TO 30.9 IN ADULT: ICD-10-CM

## 2022-12-01 PROCEDURE — 99213 OFFICE O/P EST LOW 20 MIN: CPT | Performed by: INTERNAL MEDICINE

## 2022-12-01 PROCEDURE — 3079F DIAST BP 80-89 MM HG: CPT | Performed by: INTERNAL MEDICINE

## 2022-12-01 PROCEDURE — 3008F BODY MASS INDEX DOCD: CPT | Performed by: INTERNAL MEDICINE

## 2022-12-01 PROCEDURE — 3075F SYST BP GE 130 - 139MM HG: CPT | Performed by: INTERNAL MEDICINE

## 2022-12-01 RX ORDER — PHENTERMINE HYDROCHLORIDE 37.5 MG/1
37.5 TABLET ORAL
Qty: 30 TABLET | Refills: 2 | Status: CANCELLED | OUTPATIENT
Start: 2022-12-01

## 2022-12-11 RX ORDER — LIRAGLUTIDE 6 MG/ML
INJECTION, SOLUTION SUBCUTANEOUS
Qty: 5 EACH | Refills: 0 | Status: SHIPPED | OUTPATIENT
Start: 2022-12-11 | End: 2023-02-07

## 2022-12-11 RX ORDER — PEN NEEDLE, DIABETIC 30 GX3/16"
1 NEEDLE, DISPOSABLE MISCELLANEOUS DAILY
Qty: 90 EACH | Refills: 0 | Status: SHIPPED | OUTPATIENT
Start: 2022-12-11 | End: 2023-03-11

## 2022-12-13 ENCOUNTER — PATIENT MESSAGE (OUTPATIENT)
Dept: INTERNAL MEDICINE CLINIC | Facility: CLINIC | Age: 57
End: 2022-12-13

## 2022-12-13 NOTE — TELEPHONE ENCOUNTER
From: Ronaldo Escobar  To: Sabino Childers MD  Sent: 12/13/2022 2:06 PM CST  Subject: Proper eating    1. Since starting the new medication I am eating even less. What is my daily requirement for protein/carbs/calories. 2. I am on the 1.2 dose until Saturday then I start the 1.8. I am not sure but it seems I will be out soon. My bcbs stated they do not cover unless  Insists I need it. I am unsure what is next.      Any assistance is appreciated

## 2022-12-14 NOTE — TELEPHONE ENCOUNTER
These macronutrient values reflect your cutting calories of 1,692 calories per day    Moderate Carb (30/35/35)  127g  protein  66g  fats  148g  carbs    If she has questions recommend schedule with dietitian

## 2022-12-19 NOTE — TELEPHONE ENCOUNTER
Prime form filled out and faxed with the recent chart notes attached,.          Class 1 obesity due to excess calories without serious comorbidity with body mass index (BMI) of 30.0 to 30.9 in adult  E66.09, Z68.30

## 2022-12-21 RX ORDER — METHYLPREDNISOLONE 4 MG/1
TABLET ORAL
Qty: 1 EACH | Refills: 0 | Status: SHIPPED | OUTPATIENT
Start: 2022-12-21

## 2022-12-21 NOTE — TELEPHONE ENCOUNTER
Rash needs to be completely healed prior to initation of new medicaton management otherwise risk of cross reaction.

## 2022-12-27 NOTE — TELEPHONE ENCOUNTER
rashes resolved, wanted to know if she can try new meds as discussed, please advise, also she is looking for some lab orders that includes hormones,

## 2022-12-28 NOTE — TELEPHONE ENCOUNTER
The 2 non injection options I would recommend to review is plenity or  contrave (cant drink).    Plenity works more on fullness   Contrave on hunger, stress eating emotional eating   Please have patient review

## 2023-01-03 RX ORDER — NALTREXONE HYDROCHLORIDE AND BUPROPION HYDROCHLORIDE 8; 90 MG/1; MG/1
TABLET, EXTENDED RELEASE ORAL
Qty: 120 TABLET | Refills: 0 | Status: SHIPPED | OUTPATIENT
Start: 2023-01-03

## 2023-01-19 ENCOUNTER — PATIENT MESSAGE (OUTPATIENT)
Dept: FAMILY MEDICINE CLINIC | Facility: CLINIC | Age: 58
End: 2023-01-19

## 2023-01-19 NOTE — TELEPHONE ENCOUNTER
From: Micheline Solis  To: Marta Jackson DO  Sent: 1/19/2023 2:44 PM CST  Subject: Treatment inquiry    Good afternoon. I was wondering if my messages with Dr. Mei Yanes office can be seen by your office? Everyone in her office is wonderful but I am running into an issue with my care. I asked a couple of times why I do not see my hormone level tests as I thought that was part of the initial bloodwork she wanted done. When they got back to me they said she is referring me to an office in [facility] to get my hormone levels tested if I wanted it done and I do not understand why? I am trying to find out if my hormones are stopping my weight loss even with medication. Plus that is quite a drive to get bloodwork done. This is not the first communication issue I am having with her office. I am hoping I can get some clarity as to why I would have to do this. I really do want to emphasize that her and her team are wonderful but the communcation is a little frustrating. Thank you!

## 2023-02-09 NOTE — TELEPHONE ENCOUNTER
Diagnosis:   Encounter Diagnoses   Name Primary?   • Malignant neoplasm of left lung, unspecified part of lung (CMS/HCC) Yes     Regimen: CARBOPLATIN AUC=5 + PEMETREXED 500 MG/M2 + PEMBROLIZUMAB 200 MG EVERY 21 DAYS X 4-6 CYCLES FOLLOWED BY MAINTENANCE PEMBROLIZUMAB 200 MG + PEMETREXED 500 MG/M2 EVERY 21 DAYS FOR UP TO 24 MONTHS    Cycle/Day: 1/1  Is this a C1D1 appt?  Yes, the original teaching materials were provided on 2/3/23    ANIA Carrera is supervising clinician today.    ECOG:   ECOG [02/09/23 0935]   ECOG Performance Status 0     Review and verified Advanced Directives: Yes: Advance Directive is in chart     Verified if patient has state DNR bracelet on: No; Full Code    Nursing Assessment:   A focused nursing assessment addressing the toxicity of chemotherapy was performed and the patient reports the following:    Anxiety/Depression/Insomnia: Anxiety: No, Depression: No and Insomnia: No  Pain: NO    Toxicity Assessment    Auditory/Ear  Assessment: Yes (Within Defined Limits)    Cardiac General  Assessment: Yes (Within Defined Limits)    Constitutional  Assessment: Yes (Within Defined Limits)    Dermatology/Skin  Assessment: Yes (Within Defined Limits)    Endocrine  Assessment: Yes (Within Defined Limits)    Gastrointestinal  Assessment: Yes (Within Defined Limits)    Hemorrhage/Bleeding  Assessment: Yes (Within Defined Limits)    Infection  Assessment: Yes (Within Defined Limits)    Lymphatics  Assessment: Yes (Within Defined Limits)    Musculoskeletal  Assessment: Yes (Within Defined Limits)    Neurology  Assessment: Yes (Within Defined Limits)    Ocular  Assessment: Yes (Within Defined Limits)    Pain  Assessment: Yes (Within Defined Limits)    Pulmonary/Upper Respiratory  Assessment: Yes (Within Defined Limits)    Genitourinary  Assessment: Yes (Within Defined Limits)      Patient confirms receipt of a signed copy of Anti-Cancer Treatment consent and verbalizes understanding of treatment plan:  From: Blu Forward  Sent: 5/23/2022 1:54 PM CDT  To: Dany Wilson Clinical Staff  Subject: Wrist/Shoulder    Good afternoon. Over the weekend I attempted to do some light house work using my left arm a little bit. I found that by the end of the day the pain had worsened and started to affect the left side of my neck. Also I cannot bear weight on my left arm without pain in my wrist as well.  Would it be possible to get an extension of my time off from [facility] because I am afraid I will not be able to lift any sort of weight with confidence & some items are very heavy & come in cases (laundry detergent, gallons of  etc) Yes.     Pre-Treatment: Treatment consent signed  Patient has valid pre-authorization  VS completed  Height and weight verified  BSA independently double checked & verified by two practitioners  Premed orders, including hydration, are verified prior to administration  Treatment parameters verified in patient protocol  Chemotherapy doses independently doubled checked & verified by two practitioners    Treatment: I have reviewed the following with the patient:  Name of chemo drug, duration and route of infusion, and reportable infusion-related symptoms.  Chemotherapy has not ; double checked & verified by two practitioners  Appearance and physical integrity of drugs meets standard of drug monograph; double checked & verified by two practitioners  Rate set on infusion pump is in alignment with ordered rate; double checked & verified by two practitioners  Blood return confirmed before, during and after treatment administered  Infusion pump used for non-vesicant drugs  Drugs were administered in proper sequencing  Refer to LDA and MAR for line assessment and medication administration    Post Treatment: Treatment tolerated well; no adverse reaction    Patient reported feeling slightly light-headed and slightly bloated post chemo infusion. Patient reported that she had not eaten since early in the morning and was hungry. RN offered crackers and juice and monitored patient for 15 minutes post infusion. Patient stable and discharged.     Transfusion: Not needed    Integrative Medicine: No    Oral Chemotherapy: No    Education: Instructed on calling if she develops any new symptoms prior to her next appointment. Patient to increase water intake. Patient to take antinausea medication for the next two days scheduled and then PRN. Patient to take Imodium if needed for diarrrhea.     Next appointment scheduled:   Future Appointments   Date Time Provider Department Center   2023  1:30 PM [facility] [facility]  [facility]   2/13/2023  3:00 PM SL[facility] MRI 6 WB (3T) [facility]   3/2/2023  9:15 AM [facility] ACL LAB ACLSLM[facility]   3/2/2023  9:45 AM Benita Desai MD [address]     Patient instructed to call the office with any questions or concerns.    Patient Discharged: patient discharged to home per self, ambulatory.

## 2023-03-07 ENCOUNTER — OFFICE VISIT (OUTPATIENT)
Dept: INTERNAL MEDICINE CLINIC | Facility: CLINIC | Age: 58
End: 2023-03-07
Payer: COMMERCIAL

## 2023-03-07 VITALS
BODY MASS INDEX: 29 KG/M2 | SYSTOLIC BLOOD PRESSURE: 122 MMHG | DIASTOLIC BLOOD PRESSURE: 80 MMHG | HEART RATE: 78 BPM | RESPIRATION RATE: 16 BRPM | HEIGHT: 68.5 IN

## 2023-03-07 DIAGNOSIS — Z51.81 THERAPEUTIC DRUG MONITORING: ICD-10-CM

## 2023-03-07 DIAGNOSIS — E66.09 CLASS 1 OBESITY DUE TO EXCESS CALORIES WITHOUT SERIOUS COMORBIDITY WITH BODY MASS INDEX (BMI) OF 30.0 TO 30.9 IN ADULT: Primary | ICD-10-CM

## 2023-03-07 PROCEDURE — 3008F BODY MASS INDEX DOCD: CPT | Performed by: INTERNAL MEDICINE

## 2023-03-07 PROCEDURE — 3074F SYST BP LT 130 MM HG: CPT | Performed by: INTERNAL MEDICINE

## 2023-03-07 PROCEDURE — 3079F DIAST BP 80-89 MM HG: CPT | Performed by: INTERNAL MEDICINE

## 2023-03-07 PROCEDURE — 99214 OFFICE O/P EST MOD 30 MIN: CPT | Performed by: INTERNAL MEDICINE

## 2023-03-07 RX ORDER — SEMAGLUTIDE 1.34 MG/ML
INJECTION, SOLUTION SUBCUTANEOUS
Qty: 1 EACH | Refills: 1 | Status: SHIPPED | OUTPATIENT
Start: 2023-03-07

## 2023-03-15 ENCOUNTER — TELEPHONE (OUTPATIENT)
Dept: INTERNAL MEDICINE CLINIC | Facility: CLINIC | Age: 58
End: 2023-03-15

## 2023-03-17 NOTE — TELEPHONE ENCOUNTER
Ozempic denied coverage by [employer]. How do you want to proceed?   Must have Diabetes    [id number]

## 2023-04-02 ENCOUNTER — HOSPITAL ENCOUNTER (OUTPATIENT)
Age: 58
Discharge: HOME | End: 2023-04-02
Payer: COMMERCIAL

## 2023-04-02 VITALS
SYSTOLIC BLOOD PRESSURE: 125 MMHG | TEMPERATURE: 98 F | RESPIRATION RATE: 16 BRPM | DIASTOLIC BLOOD PRESSURE: 77 MMHG | HEART RATE: 95 BPM | OXYGEN SATURATION: 96 %

## 2023-04-02 DIAGNOSIS — R05.1 ACUTE COUGH: Primary | ICD-10-CM

## 2023-04-02 DIAGNOSIS — J06.9 UPPER RESPIRATORY TRACT INFECTION, UNSPECIFIED TYPE: ICD-10-CM

## 2023-04-02 LAB — SARS-COV-2 RNA RESP QL NAA+PROBE: NOT DETECTED

## 2023-04-02 RX ORDER — IPRATROPIUM BROMIDE AND ALBUTEROL SULFATE 2.5; .5 MG/3ML; MG/3ML
3 SOLUTION RESPIRATORY (INHALATION) ONCE
Status: COMPLETED | OUTPATIENT
Start: 2023-04-02 | End: 2023-04-02

## 2023-04-02 RX ORDER — ALBUTEROL SULFATE 90 UG/1
2 AEROSOL, METERED RESPIRATORY (INHALATION) EVERY 4 HOURS PRN
Qty: 1 EACH | Refills: 0 | Status: SHIPPED | OUTPATIENT
Start: 2023-04-02 | End: 2023-05-02

## 2023-04-02 NOTE — ED INITIAL ASSESSMENT (HPI)
Cough for 1 week, states having a lot of congestion and now her ears hurt. No fever, taking robitussin and advil.

## 2023-08-02 ENCOUNTER — PATIENT MESSAGE (OUTPATIENT)
Dept: FAMILY MEDICINE CLINIC | Facility: CLINIC | Age: 58
End: 2023-08-02

## 2023-08-02 NOTE — TELEPHONE ENCOUNTER
From: Vito Mueller  To: Lurdes Torrez DO  Sent: 8/2/2023 2:51 PM CDT  Subject: Finger knuckle injury    Several months ago I caught my finger in a hand mixer, dumn I know. It was quite painful but it did not swell nor did it become unusable. However, several months later I still have pain in the knuckle. It can be quite severe. I started taping it up but after removing tape it starts to throb. At what point should I worry. Advil, ice, and keeping it taped does not seem to be helping. Thank you.

## 2023-08-07 ENCOUNTER — OFFICE VISIT (OUTPATIENT)
Dept: FAMILY MEDICINE CLINIC | Facility: CLINIC | Age: 58
End: 2023-08-07
Payer: COMMERCIAL

## 2023-08-07 VITALS
OXYGEN SATURATION: 96 % | TEMPERATURE: 97 F | HEART RATE: 78 BPM | SYSTOLIC BLOOD PRESSURE: 116 MMHG | DIASTOLIC BLOOD PRESSURE: 74 MMHG | RESPIRATION RATE: 16 BRPM | WEIGHT: 201.13 LBS | BODY MASS INDEX: 30 KG/M2

## 2023-08-07 DIAGNOSIS — S63.634A SPRAIN OF INTERPHALANGEAL JOINT OF RIGHT RING FINGER, INITIAL ENCOUNTER: Primary | ICD-10-CM

## 2023-08-07 DIAGNOSIS — M79.641 HAND PAIN, RIGHT: ICD-10-CM

## 2023-08-07 PROCEDURE — 3074F SYST BP LT 130 MM HG: CPT | Performed by: FAMILY MEDICINE

## 2023-08-07 PROCEDURE — 99213 OFFICE O/P EST LOW 20 MIN: CPT | Performed by: FAMILY MEDICINE

## 2023-08-07 PROCEDURE — 3078F DIAST BP <80 MM HG: CPT | Performed by: FAMILY MEDICINE

## 2023-08-15 RX ORDER — CARBOXYMETHYLCELLULOSE/CITRIC 0.75 G
3 CAPSULE ORAL
Qty: 180 CAPSULE | Refills: 1 | Status: SHIPPED | OUTPATIENT
Start: 2023-08-15

## 2023-08-15 NOTE — TELEPHONE ENCOUNTER
Fax from [address] [facility] to refill plenity    Requesting Plenity  LOV: 3/7/23  RTC: not noted  Last Relevant Labs: na  Filled: 6/19/23 #168 with 0 refills    Future Appointments   Date Time Provider Irvin Dela Cruz   8/22/2023  1:30 PM Noelle Wilburn., PA-C [facility]   10/10/2023 11:20 AM Rocky Tamez MD [facility] [address]

## 2023-08-22 ENCOUNTER — OFFICE VISIT (OUTPATIENT)
Dept: INTEGRATIVE MEDICINE | Facility: CLINIC | Age: 58
End: 2023-08-22
Payer: COMMERCIAL

## 2023-08-22 VITALS
HEIGHT: 68 IN | WEIGHT: 197.19 LBS | SYSTOLIC BLOOD PRESSURE: 118 MMHG | HEART RATE: 65 BPM | DIASTOLIC BLOOD PRESSURE: 64 MMHG | BODY MASS INDEX: 29.88 KG/M2 | OXYGEN SATURATION: 99 %

## 2023-08-22 DIAGNOSIS — E34.8 ENDOCRINE AXIS DYSFUNCTION: ICD-10-CM

## 2023-08-22 DIAGNOSIS — R53.82 CHRONIC FATIGUE: ICD-10-CM

## 2023-08-22 DIAGNOSIS — E04.2 MULTIPLE THYROID NODULES: Primary | ICD-10-CM

## 2023-08-22 DIAGNOSIS — R68.89 COLD INTOLERANCE: ICD-10-CM

## 2023-08-22 DIAGNOSIS — R76.8 THYROID ANTIBODY POSITIVE: ICD-10-CM

## 2023-08-22 DIAGNOSIS — B37.9 CANDIDIASIS, UNSPECIFIED: ICD-10-CM

## 2023-08-22 DIAGNOSIS — Z86.000 HISTORY OF CARCINOMA IN SITU OF BREAST: ICD-10-CM

## 2023-08-22 DIAGNOSIS — R79.89 LOW VITAMIN D LEVEL: ICD-10-CM

## 2023-08-22 PROCEDURE — 3008F BODY MASS INDEX DOCD: CPT | Performed by: PHYSICIAN ASSISTANT

## 2023-08-22 PROCEDURE — 3078F DIAST BP <80 MM HG: CPT | Performed by: PHYSICIAN ASSISTANT

## 2023-08-22 PROCEDURE — 99215 OFFICE O/P EST HI 40 MIN: CPT | Performed by: PHYSICIAN ASSISTANT

## 2023-08-22 PROCEDURE — 3074F SYST BP LT 130 MM HG: CPT | Performed by: PHYSICIAN ASSISTANT

## 2023-08-22 NOTE — PROGRESS NOTES
Daiana Krishnan is a 63year old female. Patient presents with:  Establish Care: Battling weight loss about 4 yrs       HPI:   52-year-old female new patient referred to us by Dr. Jagjit Galo presents today for    Weight Gain - reports a strong family h/o morbid obesity and that she has struggled with weight issues most of her life. Had 100lb weight loss with [employer] at about 35yo and was able to keep it off for awhile. Then was diagnosed with carcinoma in situ - had radiation and put on tamoxifen x4yrs. Started to bleed for 25days and off for 3days. Did ablation at 38yo. Quit smoking about that time. Then weight gain started despite exercise and diet changes. Fatigued by end of afternoon/early evening. Stress better - 2020/2021 hard year. +sugar cravings    H/o HTN and was put on medication + diuretic, but was able to come off the one med (unsure of name) after she lost some weight but has stayed on the diuretic due to LE edema. Currently working with a  at the gym x1.5mos - weight training, kick boxing, cardio. Has decreased in inches, but not weight. She was in the medical weight loss program with Dr. Kennedy Rhodes x 1yr with mult meds and body was not responsive in losing the weight. GI - regular daily BM, cholecystectomy in 1990s. Occasional bloating, particularly with fruit. 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   Thyroid nodules   Childhood - frequent strep and sinus infections     Currently taking antihistamine, probiotic  REVIEW OF SYSTEMS:   Review of Systems   Constitutional:  Positive for fatigue. Negative for chills and fever. Unexpected weight change: weight gain. HENT:  Positive for trouble swallowing and voice change (hoarse voice when stressed). Negative for sore throat. Eyes: Negative. Negative for visual disturbance. Respiratory: Negative. Negative for cough, shortness of breath and wheezing. Cardiovascular: Negative.   Negative for chest pain and palpitations. Gastrointestinal:  Positive for abdominal distention. Negative for abdominal pain, constipation, diarrhea, nausea and vomiting. Endocrine: Positive for cold intolerance. Genitourinary: Negative. Musculoskeletal: Negative. Skin: Negative. Allergic/Immunologic: Negative. Neurological:  Positive for tremors. Negative for weakness and headaches. Hematological: Negative. Psychiatric/Behavioral:  Positive for sleep disturbance. Labile mood            FAMILY HISTORY:      Family History   Problem Relation Age of Onset    Diabetes Father     Cancer Mother 79        breast     Breast Cancer Mother 79    Hypertension Mother     Cancer Paternal Uncle         stomach CA   Mother, overactive thyroid   All siblings - obesity    MEDICAL HISTORY:     Past Medical History:   Diagnosis Date    Allergic rhinitis, cause unspecified     Allergic rhinitis, cause unspecified     Anxiety 2018    Arthritis     Mild    Bloating     Last few months - occasional    Blurred vision     Last few months. Body piercing     Ears    Breast cancer ([facility] [address]     Carcinoma in situ of breast 1/3/11    Carpal tunnel syndrome 3/7/11    Chronic rhinitis     Decorative tattoo 2006    Diarrhea, unspecified     Occasional last few months    Essential hypertension, benign 12/22/08    Fatigue     Years. Can fall asleep any time I sit long periods    Flatulence/gas pain/belching     Last 6 months    Frequent UTI 2020    Heart palpitations     Recently gotten worse but has happened for many years    Heavy menses     Until 2011. Had ablasion. History of cardiac murmur     Most of my life - mild    Hoarseness, chronic     Sometimes its hard to speak. Comes w diff swallowing    Pain in joints     Problems with swallowing     Occ difficulty swallow food.  Voice hoarse same time    Stress 2010    Illness and new job    Thyroid disorder     pt. has cysts on thyroids    Tobacco abuse     Unspecified essential hypertension     Wears glasses        CURRENT MEDICATIONS:     Current Outpatient Medications   Medication Sig Dispense Refill    triamterene-hydroCHLOROthiazide 75-50 MG Oral Tab Take 0.5 tablets by mouth daily. (Patient taking differently: Take 0.5 tablets by mouth every other day.) 135 tablet 2    gabapentin 300 MG Oral Cap TAKE 2 CAPSULES BY MOUTH EVERY MORNING, 1 CAPSULE AT MIDDAY, AND 1 CAPSULE AT BEDTIME (Patient taking differently: TAKE 1 CAPSULES BY MOUTH EVERY MORNING, 1 CAPSULE AT MIDDAY, AND 1 CAPSULE AT BEDTIME) 360 capsule 1       SOCIAL HISTORY:   Lifestyle Factors affecting health:  Diet - Better over the last few months. cottage cheese and pineapple for breakfast, protein bar or drink for lunch. Chicken and mixed veggies, fruit for dinner. Rice cake and coffee at night. Exercise - regular, meets with   Stress - improved over last 6mos  Sleep - most of the time good, better since working out. Up at 4am for work, to bed by 9:30pm.     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    Social History     Socioeconomic History    Marital status:    Tobacco Use    Smoking status: Former     Packs/day: 1.00     Years: 30.00     Pack years: 30.00     Types: Cigarettes     Quit date: 10/1/2014     Years since quittin.8    Smokeless tobacco: Never   Vaping Use    Vaping Use: Never used   Substance and Sexual Activity    Alcohol use:  Yes     Alcohol/week: 3.0 standard drinks of alcohol     Types: 3 Standard drinks or equivalent per week     Comment: minimal    Drug use: No    Sexual activity: Yes     Partners: Male     Comment: No HIV high risk behavior   Other Topics Concern    Caffeine Concern Yes     Comment: 2 cups a day    Exercise Yes     Comment: 5x/week    Seat Belt Yes       SURGICAL HISTORY:     Past Surgical History:   Procedure Laterality Date    Biopsy of breast, incisional      Breast surgery      Cholecystectomy      Colonoscopy N/A 2017    Procedure: COLONOSCOPY, POSSIBLE BIOPSY, POSSIBLE POLYPECTOMY 32159;  Surgeon: Domingo Parish MD;  Location: [facility]    Colonoscopy & polypectomy  1/17- repeat 1/18    mulltiple polyps, tics (adenomas, TVA)    Gastro - dmg  1/17    esophageal nodule (papilloma)    Lumpectomy left  01/03/2011    DCIS    Kwame localization wire 1 site right (cpt=19281) Right     BENIGN   Late 90's     Radiation left Left     2011    Removal gallbladder         PHYSICAL EXAM:      08/22/23  1320   BP: 118/64   BP Location: Right arm   Patient Position: Sitting   Cuff Size: adult   Pulse: 65   SpO2: 99%   Weight: 197 lb 3.2 oz (89.4 kg)   Height: 5' 8\" (1.727 m)       Physical Exam  Vitals reviewed. Constitutional:       General: She is not in acute distress. Appearance: Normal appearance. HENT:      Mouth/Throat:      Mouth: Mucous membranes are moist.      Pharynx: Oropharynx is clear. Eyes:      Extraocular Movements: Extraocular movements intact. Conjunctiva/sclera: Conjunctivae normal.   Cardiovascular:      Rate and Rhythm: Normal rate and regular rhythm. Pulses: Normal pulses. Heart sounds: Normal heart sounds. No murmur heard. Pulmonary:      Effort: Pulmonary effort is normal.      Breath sounds: Normal breath sounds. Abdominal:      General: Bowel sounds are normal. There is no distension. Palpations: Abdomen is soft. Tenderness: There is no abdominal tenderness. There is no guarding. Musculoskeletal:         General: No swelling or deformity. Skin:     General: Skin is warm and dry. Neurological:      General: No focal deficit present. Mental Status: She is alert and oriented to person, place, and time. Deep Tendon Reflexes: Reflexes abnormal (Patellar R0/L1+, Brachiorad R0/L1+). Psychiatric:         Mood and Affect: Mood normal.         Behavior: Behavior normal.         Thought Content:  Thought content normal.         Judgment: Judgment normal.          ASSESSMENT AND PLAN:     Admission on 04/02/2023, Discharged on 04/02/2023   Component Date Value Ref Range Status    Rapid SARS-CoV-2 by PCR 04/02/2023 Not Detected  Not Detected Final    This test is intended for the qualitative detection of nucleic acid from the SARS-CoV-2 viral RNA from individuals who are suspected of COVID-19 infection by their healthcare provider. A \"Detected\" result is considered a positive test result for COVID-19. A \"Not Detected\" result for this test means that SARS-CoV-2 RNA was not present in the sample above the limit of detection of the assay. Test performed using the Abbott ID NOW COVID-19 assay performed on the ID NOW Instrument, [facility].; [place] [address]. This test is being used under the Food and Drug Administration's Emergency Use Authorization. The authorized Fact Sheet for Healthcare Providers for this assay is available upon request from the laboratory. [address], [facility] Proc[address] Tovar Chang [address]   Phone: [phone]  Fax: [phone]  CLIA # [id number]       No results found. 1. Multiple thyroid nodules  - US THYROID (MFI=89726); Future  - THYROID PEROXIDASE (TPO) AB; Future  - FREE T4 (FREE THYROXINE); Future  - ASSAY, THYROID STIM HORMONE; Future  - THYROID ANTITHYROGLOBULIN AB; Future  - FREE T3 (TRIIODOTHYRONINE); Future  - REVERSE T3, SERUM; Future  - CREATU+IODINE; Future    2. BMI 29.0-29.9,adult  - CBC WITH DIFFERENTIAL WITH PLATELET; Future  - COMP METABOLIC PANEL (14); Future  - HEMOGLOBIN A1C; Future  - INSULIN; Future    3. Cold intolerance  - CBC WITH DIFFERENTIAL WITH PLATELET; Future  - FERRITIN; Future  - IRON AND TIBC; Future  - THYROID PEROXIDASE (TPO) AB; Future  - FREE T4 (FREE THYROXINE); Future  - ASSAY, THYROID STIM HORMONE; Future  - THYROID ANTITHYROGLOBULIN AB; Future  - FREE T3 (TRIIODOTHYRONINE); Future  - REVERSE T3, SERUM; Future    4.  Thyroid antibody positive  - THYROID PEROXIDASE (TPO) AB; Future  - FREE T4 (FREE THYROXINE); Future  - ASSAY, THYROID STIM HORMONE; Future  - THYROID ANTITHYROGLOBULIN AB; Future  - FREE T3 (TRIIODOTHYRONINE); Future  - REVERSE T3, SERUM; Future  - CREATU+IODINE; Future    5. Chronic fatigue  - CBC WITH DIFFERENTIAL WITH PLATELET; Future  - COMP METABOLIC PANEL (14); Future  - CORTISOL; Future  - FERRITIN; Future  - IRON AND TIBC; Future  - VITAMIN B12; Future  - VITAMIN B6; Future  - THYROID PEROXIDASE (TPO) AB; Future  - FREE T4 (FREE THYROXINE); Future  - ASSAY, THYROID STIM HORMONE; Future  - THYROID ANTITHYROGLOBULIN AB; Future  - FREE T3 (TRIIODOTHYRONINE); Future  - REVERSE T3, SERUM; Future    6. History of carcinoma in situ of breast    7. Candidiasis, unspecified  - CANDIDA IGG/A/M AB PANEL; Future    8. Low vitamin D level  - VITAMIN D; Future    9. Endocrine axis dysfunction  - DEHYDROEPIANDROSTERONE SULFATE; Future  - PREGNENOLONE BY MS/MS, SERUM; Future  - VITAMIN B12; Future  - VITAMIN B6; Future  - CREATU+IODINE; Future      Time spent with patient: Over 50 minutes spent in chart review and in direct communication with patient obtaining and reviewing history, creating a unique care plan, explaining the rationale for treatment, reviewing potential SE and overall treatment plan,  documenting all clinical information in Epic. Over 50% of this time was in education, counseling and coordination of care.      Problem List Items Addressed This Visit          Endocrine and Metabolic    Multiple thyroid nodules - Primary    Relevant Orders     THYROID (CPT=76536)    THYROID PEROXIDASE (TPO) AB    FREE T4 (FREE THYROXINE)    ASSAY, THYROID STIM HORMONE    THYROID ANTITHYROGLOBULIN AB    FREE T3 (TRIIODOTHYRONINE)    REVERSE T3, SERUM    CREATU+IODINE     Other Visit Diagnoses       BMI 29.0-29.9,adult        Relevant Orders    CBC WITH DIFFERENTIAL WITH PLATELET    COMP METABOLIC PANEL (14)    HEMOGLOBIN A1C    INSULIN    Cold intolerance Relevant Orders    CBC WITH DIFFERENTIAL WITH PLATELET    FERRITIN    IRON AND TIBC    THYROID PEROXIDASE (TPO) AB    FREE T4 (FREE THYROXINE)    ASSAY, THYROID STIM HORMONE    THYROID ANTITHYROGLOBULIN AB    FREE T3 (TRIIODOTHYRONINE)    REVERSE T3, SERUM    Thyroid antibody positive        Relevant Orders    THYROID PEROXIDASE (TPO) AB    FREE T4 (FREE THYROXINE)    ASSAY, THYROID STIM HORMONE    THYROID ANTITHYROGLOBULIN AB    FREE T3 (TRIIODOTHYRONINE)    REVERSE T3, SERUM    CREATU+IODINE    Chronic fatigue        Relevant Orders    CBC WITH DIFFERENTIAL WITH PLATELET    COMP METABOLIC PANEL (14)    CORTISOL    FERRITIN    IRON AND TIBC    VITAMIN B12    VITAMIN B6    THYROID PEROXIDASE (TPO) AB    FREE T4 (FREE THYROXINE)    ASSAY, THYROID STIM HORMONE    THYROID ANTITHYROGLOBULIN AB    FREE T3 (TRIIODOTHYRONINE)    REVERSE T3, SERUM    History of carcinoma in situ of breast        Candidiasis, unspecified        Relevant Orders    DIETER IGG/A/M AB PANEL    Low vitamin D level        Relevant Orders    VITAMIN D    Endocrine axis dysfunction        Relevant Orders    DEHYDROEPIANDROSTERONE SULFATE    PREGNENOLONE BY MS/MS, SERUM    VITAMIN B12    VITAMIN B6    CREATU+IODINE           Endocrine dysfunction-patient has complaints of resistance to weight loss despite minimal food intake and daily exercise, fatigue, abdominal bloating, hoarse voice and difficulty swallowing in response to stress, and cold intolerance. Upon review of last lab work in 2022, patient had significant thyroid antibodies and high normal TSH. Recheck levels today. Obesity resistant to weight loss medications and positive lifestyle changes. Discussed with patient the cause is most likely multifactorial including genetics, intestinal permeability, inflammation, toxin exposures and endocrine dysfunction.      Dysbiosis -suspect Candida overgrowth as abdominal bloating is made worse with fruit or sugar and patient reports significant sweet cravings. History of recurrent Candida vaginitis. Will check for antibody levels today. Recommendations regarding removal of gluten and reducing high glycemic foods as they may be contributing to the immune system activation and intestinal permeability. Follow-up in 1 to 2 months once lab work is complete to discuss results, recommendations, and next steps. Orders Placed This Visit:  Orders Placed This Encounter      Candida IgG, IgA, IgM Antibody Panel      CBC With Differential With Platelet      Comp Metabolic Panel (14)      Cortisol      Dehydroepiandrosterone Sulfate      Pregnenolone by MS/MS, Serum      Ferritin      Iron And Tibc      Hemoglobin A1C      Insulin      Vitamin D      Vitamin B12      Vitamin B6      Thyroid Peroxidase (TPO) AB      Free T4, (Free Thyroxine)      Assay, Thyroid Stim Hormone      Thyroid Antithyroglobulin AB      Free T3 (Triiodothryronine)      Reverse T3, Serum      Iodine and Creatinine, Urine 24hr    No orders of the defined types were placed in this encounter. Patient Instructions   See handouts for avoiding gluten and foods that are safer to eat if there is a candida issue. No follow-ups on file. Patient affirmed understanding of plan and all questions were answered.      Shoshana Wilson PA-C

## 2023-08-25 ENCOUNTER — LAB ENCOUNTER (OUTPATIENT)
Dept: LAB | Age: 58
End: 2023-08-25
Attending: PHYSICIAN ASSISTANT
Payer: COMMERCIAL

## 2023-08-25 DIAGNOSIS — R68.89 COLD INTOLERANCE: ICD-10-CM

## 2023-08-25 DIAGNOSIS — R79.89 LOW VITAMIN D LEVEL: ICD-10-CM

## 2023-08-25 DIAGNOSIS — E04.2 MULTIPLE THYROID NODULES: ICD-10-CM

## 2023-08-25 DIAGNOSIS — B37.9 CANDIDIASIS, UNSPECIFIED: ICD-10-CM

## 2023-08-25 DIAGNOSIS — E34.8 ENDOCRINE AXIS DYSFUNCTION: ICD-10-CM

## 2023-08-25 DIAGNOSIS — R76.8 THYROID ANTIBODY POSITIVE: ICD-10-CM

## 2023-08-25 DIAGNOSIS — R53.82 CHRONIC FATIGUE: ICD-10-CM

## 2023-08-25 LAB
ALBUMIN SERPL-MCNC: 3.5 G/DL (ref 3.4–5)
ALBUMIN/GLOB SERPL: 0.9 {RATIO} (ref 1–2)
ALP LIVER SERPL-CCNC: 98 U/L
ALT SERPL-CCNC: 23 U/L
ANION GAP SERPL CALC-SCNC: 2 MMOL/L (ref 0–18)
AST SERPL-CCNC: 21 U/L (ref 15–37)
BASOPHILS # BLD AUTO: 0.04 X10(3) UL (ref 0–0.2)
BASOPHILS NFR BLD AUTO: 0.5 %
BILIRUB SERPL-MCNC: 0.9 MG/DL (ref 0.1–2)
BUN BLD-MCNC: 16 MG/DL (ref 7–18)
CALCIUM BLD-MCNC: 9.2 MG/DL (ref 8.5–10.1)
CHLORIDE SERPL-SCNC: 104 MMOL/L (ref 98–112)
CO2 SERPL-SCNC: 32 MMOL/L (ref 21–32)
CORTIS SERPL-MCNC: 7.8 UG/DL
CREAT BLD-MCNC: 0.96 MG/DL
DEPRECATED HBV CORE AB SER IA-ACNC: 44.6 NG/ML
DHEA-S SERPL-MCNC: 16.7 UG/DL
EGFRCR SERPLBLD CKD-EPI 2021: 69 ML/MIN/1.73M2 (ref 60–?)
EOSINOPHIL # BLD AUTO: 0.1 X10(3) UL (ref 0–0.7)
EOSINOPHIL NFR BLD AUTO: 1.3 %
ERYTHROCYTE [DISTWIDTH] IN BLOOD BY AUTOMATED COUNT: 12.9 %
EST. AVERAGE GLUCOSE BLD GHB EST-MCNC: 108 MG/DL (ref 68–126)
FASTING STATUS PATIENT QL REPORTED: YES
GLOBULIN PLAS-MCNC: 3.8 G/DL (ref 2.8–4.4)
GLUCOSE BLD-MCNC: 105 MG/DL (ref 70–99)
HBA1C MFR BLD: 5.4 % (ref ?–5.7)
HCT VFR BLD AUTO: 44.8 %
HGB BLD-MCNC: 15.4 G/DL
IMM GRANULOCYTES # BLD AUTO: 0.03 X10(3) UL (ref 0–1)
IMM GRANULOCYTES NFR BLD: 0.4 %
INSULIN SERPL-ACNC: 12.4 MU/L (ref 3–25)
IRON SATN MFR SERPL: 24 %
IRON SERPL-MCNC: 96 UG/DL
LYMPHOCYTES # BLD AUTO: 2.11 X10(3) UL (ref 1–4)
LYMPHOCYTES NFR BLD AUTO: 26.4 %
MCH RBC QN AUTO: 28.4 PG (ref 26–34)
MCHC RBC AUTO-ENTMCNC: 34.4 G/DL (ref 31–37)
MCV RBC AUTO: 82.7 FL
MONOCYTES # BLD AUTO: 0.73 X10(3) UL (ref 0.1–1)
MONOCYTES NFR BLD AUTO: 9.1 %
NEUTROPHILS # BLD AUTO: 4.97 X10 (3) UL (ref 1.5–7.7)
NEUTROPHILS # BLD AUTO: 4.97 X10(3) UL (ref 1.5–7.7)
NEUTROPHILS NFR BLD AUTO: 62.3 %
OSMOLALITY SERPL CALC.SUM OF ELEC: 288 MOSM/KG (ref 275–295)
PLATELET # BLD AUTO: 279 10(3)UL (ref 150–450)
POTASSIUM SERPL-SCNC: 3.6 MMOL/L (ref 3.5–5.1)
PROT SERPL-MCNC: 7.3 G/DL (ref 6.4–8.2)
RBC # BLD AUTO: 5.42 X10(6)UL
SODIUM SERPL-SCNC: 138 MMOL/L (ref 136–145)
T3FREE SERPL-MCNC: 2.83 PG/ML (ref 2.4–4.2)
T4 FREE SERPL-MCNC: 0.9 NG/DL (ref 0.8–1.7)
THYROGLOB SERPL-MCNC: 112 U/ML (ref ?–60)
THYROPEROXIDASE AB SERPL-ACNC: 854 U/ML (ref ?–60)
TIBC SERPL-MCNC: 399 UG/DL (ref 240–450)
TRANSFERRIN SERPL-MCNC: 268 MG/DL (ref 200–360)
TSI SER-ACNC: 3.82 MIU/ML (ref 0.36–3.74)
VIT B12 SERPL-MCNC: 615 PG/ML (ref 193–986)
VIT D+METAB SERPL-MCNC: 32.3 NG/ML (ref 30–100)
WBC # BLD AUTO: 8 X10(3) UL (ref 4–11)

## 2023-08-25 PROCEDURE — 82306 VITAMIN D 25 HYDROXY: CPT

## 2023-08-25 PROCEDURE — 84482 T3 REVERSE: CPT

## 2023-08-25 PROCEDURE — 82627 DEHYDROEPIANDROSTERONE: CPT

## 2023-08-25 PROCEDURE — 83036 HEMOGLOBIN GLYCOSYLATED A1C: CPT

## 2023-08-25 PROCEDURE — 86800 THYROGLOBULIN ANTIBODY: CPT

## 2023-08-25 PROCEDURE — 84439 ASSAY OF FREE THYROXINE: CPT

## 2023-08-25 PROCEDURE — 82607 VITAMIN B-12: CPT

## 2023-08-25 PROCEDURE — 86628 CANDIDA ANTIBODY: CPT

## 2023-08-25 PROCEDURE — 36415 COLL VENOUS BLD VENIPUNCTURE: CPT

## 2023-08-25 PROCEDURE — 86376 MICROSOMAL ANTIBODY EACH: CPT

## 2023-08-25 PROCEDURE — 84140 ASSAY OF PREGNENOLONE: CPT

## 2023-08-25 PROCEDURE — 84481 FREE ASSAY (FT-3): CPT

## 2023-08-25 PROCEDURE — 84443 ASSAY THYROID STIM HORMONE: CPT

## 2023-08-25 PROCEDURE — 85025 COMPLETE CBC W/AUTO DIFF WBC: CPT

## 2023-08-25 PROCEDURE — 80053 COMPREHEN METABOLIC PANEL: CPT

## 2023-08-25 PROCEDURE — 83525 ASSAY OF INSULIN: CPT

## 2023-08-25 PROCEDURE — 83540 ASSAY OF IRON: CPT

## 2023-08-25 PROCEDURE — 83550 IRON BINDING TEST: CPT

## 2023-08-25 PROCEDURE — 82728 ASSAY OF FERRITIN: CPT

## 2023-08-25 PROCEDURE — 84207 ASSAY OF VITAMIN B-6: CPT

## 2023-08-25 PROCEDURE — 82533 TOTAL CORTISOL: CPT

## 2023-08-27 ENCOUNTER — HOSPITAL ENCOUNTER (OUTPATIENT)
Dept: ULTRASOUND IMAGING | Age: 58
Discharge: HOME | End: 2023-08-27
Attending: PHYSICIAN ASSISTANT
Payer: COMMERCIAL

## 2023-08-27 ENCOUNTER — HOSPITAL ENCOUNTER (OUTPATIENT)
Dept: ULTRASOUND IMAGING | Age: 58
End: 2023-08-27
Attending: PHYSICIAN ASSISTANT
Payer: COMMERCIAL

## 2023-08-27 DIAGNOSIS — E04.2 MULTIPLE THYROID NODULES: ICD-10-CM

## 2023-08-27 PROCEDURE — 76536 US EXAM OF HEAD AND NECK: CPT | Performed by: PHYSICIAN ASSISTANT

## 2023-08-28 LAB
CANDIDA IGA AB: NEGATIVE
CANDIDA IGG AB: NEGATIVE
CANDIDA IGM AB IGM: NEGATIVE

## 2023-08-29 LAB — VITAMIN B6: 8.5 UG/L

## 2023-09-01 LAB — REVERSE T3: 14.3 NG/DL

## 2023-09-02 LAB — PREGNENOLONE: <10 NG/DL

## 2023-09-07 ENCOUNTER — PATIENT MESSAGE (OUTPATIENT)
Dept: INTEGRATIVE MEDICINE | Facility: CLINIC | Age: 58
End: 2023-09-07

## 2023-09-07 DIAGNOSIS — E06.3 HYPOTHYROIDISM DUE TO HASHIMOTO'S THYROIDITIS: Primary | ICD-10-CM

## 2023-09-07 DIAGNOSIS — E03.8 HYPOTHYROIDISM DUE TO HASHIMOTO'S THYROIDITIS: Primary | ICD-10-CM

## 2023-09-13 ENCOUNTER — PATIENT MESSAGE (OUTPATIENT)
Dept: FAMILY MEDICINE CLINIC | Facility: CLINIC | Age: 58
End: 2023-09-13

## 2023-09-14 RX ORDER — CLINDAMYCIN HYDROCHLORIDE 300 MG/1
300 CAPSULE ORAL 3 TIMES DAILY
Qty: 21 CAPSULE | Refills: 0 | Status: SHIPPED | OUTPATIENT
Start: 2023-09-14 | End: 2023-09-21

## 2023-09-15 RX ORDER — LEVOTHYROXINE SODIUM 0.03 MG/1
25 TABLET ORAL
Qty: 30 TABLET | Refills: 1 | Status: SHIPPED | OUTPATIENT
Start: 2023-09-15

## 2023-09-15 RX ORDER — LEVOTHYROXINE SODIUM 0.03 MG/1
25 TABLET ORAL
Qty: 30 TABLET | Refills: 1 | Status: SHIPPED | OUTPATIENT
Start: 2023-09-15 | End: 2023-09-15

## 2023-10-10 ENCOUNTER — OFFICE VISIT (OUTPATIENT)
Dept: INTERNAL MEDICINE CLINIC | Facility: CLINIC | Age: 58
End: 2023-10-10
Payer: COMMERCIAL

## 2023-10-10 VITALS
HEIGHT: 68.5 IN | WEIGHT: 200 LBS | BODY MASS INDEX: 29.96 KG/M2 | RESPIRATION RATE: 18 BRPM | DIASTOLIC BLOOD PRESSURE: 78 MMHG | SYSTOLIC BLOOD PRESSURE: 120 MMHG | HEART RATE: 84 BPM

## 2023-10-10 DIAGNOSIS — R11.0 NAUSEA: ICD-10-CM

## 2023-10-10 DIAGNOSIS — E66.09 CLASS 1 OBESITY DUE TO EXCESS CALORIES WITHOUT SERIOUS COMORBIDITY WITH BODY MASS INDEX (BMI) OF 30.0 TO 30.9 IN ADULT: ICD-10-CM

## 2023-10-10 DIAGNOSIS — Z51.81 THERAPEUTIC DRUG MONITORING: Primary | ICD-10-CM

## 2023-10-10 PROCEDURE — 3078F DIAST BP <80 MM HG: CPT | Performed by: INTERNAL MEDICINE

## 2023-10-10 PROCEDURE — 3008F BODY MASS INDEX DOCD: CPT | Performed by: INTERNAL MEDICINE

## 2023-10-10 PROCEDURE — 99214 OFFICE O/P EST MOD 30 MIN: CPT | Performed by: INTERNAL MEDICINE

## 2023-10-10 PROCEDURE — 3074F SYST BP LT 130 MM HG: CPT | Performed by: INTERNAL MEDICINE

## 2023-10-10 RX ORDER — PHENTERMINE HYDROCHLORIDE 37.5 MG/1
37.5 TABLET ORAL
Qty: 30 TABLET | Refills: 2 | Status: SHIPPED | OUTPATIENT
Start: 2023-10-10

## 2023-10-10 NOTE — PATIENT INSTRUCTIONS
Ozempic new start at [address] [place] [address]. [facility]  Watch [email or web address] how to use video      IF has questions can schedule nurse visit once Rx comes in         [email or web address]  Phone: [phone]   Fax : [phone]      FOR THE 2 MG PEN    9 clicks -1.19   18 clicks - 0.5 mg   37 clicks -1 mg

## 2023-10-24 ENCOUNTER — OFFICE VISIT (OUTPATIENT)
Dept: INTEGRATIVE MEDICINE | Facility: CLINIC | Age: 58
End: 2023-10-24

## 2023-10-24 VITALS
BODY MASS INDEX: 30.16 KG/M2 | OXYGEN SATURATION: 97 % | HEART RATE: 88 BPM | SYSTOLIC BLOOD PRESSURE: 110 MMHG | WEIGHT: 199 LBS | HEIGHT: 68 IN | RESPIRATION RATE: 14 BRPM | DIASTOLIC BLOOD PRESSURE: 80 MMHG

## 2023-10-24 DIAGNOSIS — R53.82 CHRONIC FATIGUE: ICD-10-CM

## 2023-10-24 DIAGNOSIS — E04.2 MULTIPLE THYROID NODULES: ICD-10-CM

## 2023-10-24 DIAGNOSIS — E03.8 HYPOTHYROIDISM DUE TO HASHIMOTO'S THYROIDITIS: Primary | ICD-10-CM

## 2023-10-24 DIAGNOSIS — E06.3 HYPOTHYROIDISM DUE TO HASHIMOTO'S THYROIDITIS: Primary | ICD-10-CM

## 2023-10-24 DIAGNOSIS — R79.89 LOW VITAMIN D LEVEL: ICD-10-CM

## 2023-10-24 DIAGNOSIS — G47.00 INSOMNIA, UNSPECIFIED TYPE: ICD-10-CM

## 2023-10-24 DIAGNOSIS — E34.8 ENDOCRINE AXIS DYSFUNCTION: ICD-10-CM

## 2023-10-24 PROCEDURE — 3079F DIAST BP 80-89 MM HG: CPT | Performed by: PHYSICIAN ASSISTANT

## 2023-10-24 PROCEDURE — 99215 OFFICE O/P EST HI 40 MIN: CPT | Performed by: PHYSICIAN ASSISTANT

## 2023-10-24 PROCEDURE — 3074F SYST BP LT 130 MM HG: CPT | Performed by: PHYSICIAN ASSISTANT

## 2023-10-24 PROCEDURE — 3008F BODY MASS INDEX DOCD: CPT | Performed by: PHYSICIAN ASSISTANT

## 2023-10-25 ENCOUNTER — PATIENT MESSAGE (OUTPATIENT)
Dept: INTEGRATIVE MEDICINE | Facility: CLINIC | Age: 58
End: 2023-10-25

## 2023-10-26 NOTE — TELEPHONE ENCOUNTER
From: Vito Mueller  To: Jamilah Hare  Sent: 10/25/2023 5:43 PM CDT  Subject: Prescription     Good evening. My pharmacy has no record of the prescription for thyroid medicine . 50 mg.     I still have . 25mg so can I take 2 daily until prescription can be completed? Thanks!     Maddy Alcides

## 2023-10-30 ENCOUNTER — LAB ENCOUNTER (OUTPATIENT)
Dept: LAB | Age: 58
End: 2023-10-30
Attending: PHYSICIAN ASSISTANT
Payer: COMMERCIAL

## 2023-10-30 DIAGNOSIS — E03.8 HYPOTHYROIDISM DUE TO HASHIMOTO'S THYROIDITIS: ICD-10-CM

## 2023-10-30 DIAGNOSIS — E06.3 HYPOTHYROIDISM DUE TO HASHIMOTO'S THYROIDITIS: ICD-10-CM

## 2023-10-30 LAB
T3FREE SERPL-MCNC: 2.68 PG/ML (ref 2.4–4.2)
T4 FREE SERPL-MCNC: 1.2 NG/DL (ref 0.8–1.7)
TSI SER-ACNC: 3.88 MIU/ML (ref 0.36–3.74)

## 2023-10-30 PROCEDURE — 84482 T3 REVERSE: CPT

## 2023-10-30 PROCEDURE — 84439 ASSAY OF FREE THYROXINE: CPT

## 2023-10-30 PROCEDURE — 84443 ASSAY THYROID STIM HORMONE: CPT

## 2023-10-30 PROCEDURE — 36415 COLL VENOUS BLD VENIPUNCTURE: CPT

## 2023-10-30 PROCEDURE — 84481 FREE ASSAY (FT-3): CPT

## 2023-11-05 LAB — REVERSE T3: 17.9 NG/DL

## 2023-11-10 ENCOUNTER — OFFICE VISIT (OUTPATIENT)
Dept: ORTHOPEDICS CLINIC | Facility: CLINIC | Age: 58
End: 2023-11-10
Payer: COMMERCIAL

## 2023-11-10 DIAGNOSIS — M75.02 ADHESIVE CAPSULITIS OF LEFT SHOULDER: Primary | ICD-10-CM

## 2023-11-10 RX ORDER — TRIAMCINOLONE ACETONIDE 40 MG/ML
40 INJECTION, SUSPENSION INTRA-ARTICULAR; INTRAMUSCULAR ONCE
Status: COMPLETED | OUTPATIENT
Start: 2023-11-10 | End: 2023-11-10

## 2023-11-10 RX ORDER — KETOROLAC TROMETHAMINE 30 MG/ML
30 INJECTION, SOLUTION INTRAMUSCULAR; INTRAVENOUS ONCE
Status: COMPLETED | OUTPATIENT
Start: 2023-11-10 | End: 2023-11-10

## 2023-11-10 RX ADMIN — TRIAMCINOLONE ACETONIDE 40 MG: 40 INJECTION, SUSPENSION INTRA-ARTICULAR; INTRAMUSCULAR at 12:30:00

## 2023-11-10 RX ADMIN — KETOROLAC TROMETHAMINE 30 MG: 30 INJECTION, SOLUTION INTRAMUSCULAR; INTRAVENOUS at 12:30:00

## 2023-11-10 NOTE — PROCEDURES
Left Shoulder Glenohumeral Joint Injection    Name: Douglas Gasca   MRN: [id number]  Date: 11/10/2023     Clinical Indications:   Adhesive Capsulitis. After informed consent, the injection site was marked, sterilized with topical chlorhexidine antiseptic, and locally anesthetized with skin refrigerant. The patient was seated upright and the shoulder was exposed. Using sterile technique: 1 mL of 30mg/mL of Ketorolac, 2 mL of 0.5% Bupivicaine, 2 mL of 1% Lidocaine, and 1 mg of 40mg/mL of Triamcinolone (Kenalog) was injected with a Anterior approach utilizing a 22 gauge needle. A band-aid was applied. The patient tolerated the procedure well. Anmol Hines. Jaylen Hall MD  Knee, Shoulder, & Elbow Surgery / Sports Medicine Specialist  THE [facility]  [address]   [email or web address]  t: [phone]  o: [phone]  f: [phone]

## 2023-11-25 RX ORDER — TRIAMTERENE AND HYDROCHLOROTHIAZIDE 75; 50 MG/1; MG/1
0.5 TABLET ORAL DAILY
Qty: 45 TABLET | Refills: 1 | Status: SHIPPED | OUTPATIENT
Start: 2023-11-25

## 2023-12-21 ENCOUNTER — TELEPHONE (OUTPATIENT)
Dept: INTEGRATIVE MEDICINE | Facility: CLINIC | Age: 58
End: 2023-12-21

## 2023-12-21 ENCOUNTER — LAB ENCOUNTER (OUTPATIENT)
Dept: LAB | Age: 58
End: 2023-12-21
Attending: PHYSICIAN ASSISTANT
Payer: COMMERCIAL

## 2023-12-21 ENCOUNTER — TELEPHONE (OUTPATIENT)
Dept: FAMILY MEDICINE CLINIC | Facility: CLINIC | Age: 58
End: 2023-12-21

## 2023-12-21 DIAGNOSIS — E06.3 HYPOTHYROIDISM DUE TO HASHIMOTO'S THYROIDITIS: ICD-10-CM

## 2023-12-21 DIAGNOSIS — E03.8 HYPOTHYROIDISM DUE TO HASHIMOTO'S THYROIDITIS: Primary | ICD-10-CM

## 2023-12-21 DIAGNOSIS — E06.3 HYPOTHYROIDISM DUE TO HASHIMOTO'S THYROIDITIS: Primary | ICD-10-CM

## 2023-12-21 DIAGNOSIS — E03.8 HYPOTHYROIDISM DUE TO HASHIMOTO'S THYROIDITIS: ICD-10-CM

## 2023-12-21 LAB
T3FREE SERPL-MCNC: 2.63 PG/ML (ref 2.4–4.2)
T4 FREE SERPL-MCNC: 1.2 NG/DL (ref 0.8–1.7)
TSI SER-ACNC: 3.64 MIU/ML (ref 0.36–3.74)

## 2023-12-21 PROCEDURE — 36415 COLL VENOUS BLD VENIPUNCTURE: CPT

## 2023-12-21 PROCEDURE — 84443 ASSAY THYROID STIM HORMONE: CPT

## 2023-12-21 PROCEDURE — 84481 FREE ASSAY (FT-3): CPT

## 2023-12-21 PROCEDURE — 84439 ASSAY OF FREE THYROXINE: CPT

## 2023-12-21 PROCEDURE — 84482 T3 REVERSE: CPT

## 2023-12-21 NOTE — TELEPHONE ENCOUNTER
Patient contacted clinic requesting repeat bloodwork for thyroid, she is at the lab to complete them.    Thank you

## 2023-12-28 LAB — REVERSE T3: 31.4 NG/DL

## 2024-01-06 ENCOUNTER — PATIENT MESSAGE (OUTPATIENT)
Dept: INTEGRATIVE MEDICINE | Facility: CLINIC | Age: 59
End: 2024-01-06

## 2024-01-06 DIAGNOSIS — E34.8 ENDOCRINE AXIS DYSFUNCTION: ICD-10-CM

## 2024-01-06 DIAGNOSIS — E03.8 HYPOTHYROIDISM DUE TO HASHIMOTO'S THYROIDITIS: Primary | ICD-10-CM

## 2024-01-06 DIAGNOSIS — E06.3 HYPOTHYROIDISM DUE TO HASHIMOTO'S THYROIDITIS: Primary | ICD-10-CM

## 2024-01-08 NOTE — TELEPHONE ENCOUNTER
From: Shruthi Arroyo  To: Daniela Forrest  Sent: 1/6/2024 5:04 PM CST  Subject: Bloodwork results     Had bloodwork done on 12/21. Does not show theyve been seen yet.     Looks like after taking all supplements and higher dose thyroid meds nothing has changed.     Please advise.     Thank you.

## 2024-01-13 RX ORDER — LEVOTHYROXINE SODIUM 0.07 MG/1
75 TABLET ORAL
Qty: 30 TABLET | Refills: 2 | Status: SHIPPED | OUTPATIENT
Start: 2024-01-13

## 2024-01-25 ENCOUNTER — PATIENT MESSAGE (OUTPATIENT)
Dept: INTEGRATIVE MEDICINE | Facility: CLINIC | Age: 59
End: 2024-01-25

## 2024-01-25 DIAGNOSIS — E03.8 HYPOTHYROIDISM DUE TO HASHIMOTO'S THYROIDITIS: Primary | ICD-10-CM

## 2024-01-25 DIAGNOSIS — E06.3 HYPOTHYROIDISM DUE TO HASHIMOTO'S THYROIDITIS: Primary | ICD-10-CM

## 2024-01-25 NOTE — TELEPHONE ENCOUNTER
From: Shruthi Arroyo  To: Daniela Forrest  Sent: 1/25/2024 4:21 PM CST  Subject: Appointment    Good afternoon.    I entered an appt time in the system. I scheduled it 2 months out because my Thyroid medicine was recently changed so I would like to get bloodwork done to see if the numbers have changed before I come in for a visit.    Can we please get the bloodwork order in so I can go the beginning of March?    Let me know.  Thank you!

## 2024-01-25 NOTE — TELEPHONE ENCOUNTER
No current appt for Daniela IVY scheduled. Message sent to  to contact the pt to schedule an appt.     Message sent to Daniela IVY with pt's request for thyroid lab orders prior to appt.

## 2024-03-09 ENCOUNTER — LAB ENCOUNTER (OUTPATIENT)
Dept: LAB | Age: 59
End: 2024-03-09
Attending: PHYSICIAN ASSISTANT
Payer: COMMERCIAL

## 2024-03-09 DIAGNOSIS — E03.8 HYPOTHYROIDISM DUE TO HASHIMOTO'S THYROIDITIS: ICD-10-CM

## 2024-03-09 DIAGNOSIS — E06.3 HYPOTHYROIDISM DUE TO HASHIMOTO'S THYROIDITIS: ICD-10-CM

## 2024-03-09 DIAGNOSIS — E34.8 ENDOCRINE AXIS DYSFUNCTION: ICD-10-CM

## 2024-03-09 LAB
DHEA-S SERPL-MCNC: 92.6 UG/DL
T3FREE SERPL-MCNC: 2.49 PG/ML (ref 2.4–4.2)
T4 FREE SERPL-MCNC: 1.3 NG/DL (ref 0.8–1.7)
THYROGLOB SERPL-MCNC: 146 U/ML (ref ?–60)
THYROPEROXIDASE AB SERPL-ACNC: 687 U/ML (ref ?–60)
TSI SER-ACNC: 2.21 MIU/ML (ref 0.36–3.74)

## 2024-03-09 PROCEDURE — 36415 COLL VENOUS BLD VENIPUNCTURE: CPT

## 2024-03-09 PROCEDURE — 86376 MICROSOMAL ANTIBODY EACH: CPT

## 2024-03-09 PROCEDURE — 86800 THYROGLOBULIN ANTIBODY: CPT

## 2024-03-09 PROCEDURE — 84482 T3 REVERSE: CPT

## 2024-03-09 PROCEDURE — 84439 ASSAY OF FREE THYROXINE: CPT

## 2024-03-09 PROCEDURE — 84443 ASSAY THYROID STIM HORMONE: CPT

## 2024-03-09 PROCEDURE — 84481 FREE ASSAY (FT-3): CPT

## 2024-03-09 PROCEDURE — 82627 DEHYDROEPIANDROSTERONE: CPT

## 2024-03-09 PROCEDURE — 84140 ASSAY OF PREGNENOLONE: CPT

## 2024-03-13 LAB — REVERSE T3: 30.5 NG/DL

## 2024-03-18 LAB — PREGNENOLONE: 30 NG/DL

## 2024-03-26 ENCOUNTER — PATIENT MESSAGE (OUTPATIENT)
Dept: INTEGRATIVE MEDICINE | Facility: CLINIC | Age: 59
End: 2024-03-26

## 2024-03-26 ENCOUNTER — OFFICE VISIT (OUTPATIENT)
Dept: INTEGRATIVE MEDICINE | Facility: CLINIC | Age: 59
End: 2024-03-26
Payer: COMMERCIAL

## 2024-03-26 VITALS
BODY MASS INDEX: 29.7 KG/M2 | OXYGEN SATURATION: 97 % | HEIGHT: 68 IN | DIASTOLIC BLOOD PRESSURE: 82 MMHG | WEIGHT: 196 LBS | HEART RATE: 77 BPM | SYSTOLIC BLOOD PRESSURE: 124 MMHG

## 2024-03-26 DIAGNOSIS — E03.8 HYPOTHYROIDISM DUE TO HASHIMOTO'S THYROIDITIS: Primary | ICD-10-CM

## 2024-03-26 DIAGNOSIS — E88.819 INSULIN RESISTANCE: ICD-10-CM

## 2024-03-26 DIAGNOSIS — R76.8 THYROID ANTIBODY POSITIVE: ICD-10-CM

## 2024-03-26 DIAGNOSIS — E06.3 HYPOTHYROIDISM DUE TO HASHIMOTO'S THYROIDITIS: Primary | ICD-10-CM

## 2024-03-26 DIAGNOSIS — R53.82 CHRONIC FATIGUE: ICD-10-CM

## 2024-03-26 DIAGNOSIS — E34.8 ENDOCRINE AXIS DYSFUNCTION: ICD-10-CM

## 2024-03-26 DIAGNOSIS — R68.82 LOW LIBIDO: ICD-10-CM

## 2024-03-26 DIAGNOSIS — E04.2 MULTIPLE THYROID NODULES: ICD-10-CM

## 2024-03-26 DIAGNOSIS — G47.00 INSOMNIA, UNSPECIFIED TYPE: ICD-10-CM

## 2024-03-26 PROCEDURE — 3079F DIAST BP 80-89 MM HG: CPT | Performed by: PHYSICIAN ASSISTANT

## 2024-03-26 PROCEDURE — 3074F SYST BP LT 130 MM HG: CPT | Performed by: PHYSICIAN ASSISTANT

## 2024-03-26 PROCEDURE — 3008F BODY MASS INDEX DOCD: CPT | Performed by: PHYSICIAN ASSISTANT

## 2024-03-26 PROCEDURE — 99215 OFFICE O/P EST HI 40 MIN: CPT | Performed by: PHYSICIAN ASSISTANT

## 2024-03-26 RX ORDER — THYROID 15 MG/1
15 TABLET ORAL DAILY
Qty: 90 TABLET | Refills: 1 | Status: SHIPPED | OUTPATIENT
Start: 2024-03-26 | End: 2024-03-27

## 2024-03-26 RX ORDER — BLOOD-GLUCOSE,RECEIVER,CONT
1 EACH MISCELLANEOUS
Qty: 1 EACH | Refills: 0 | Status: SHIPPED | OUTPATIENT
Start: 2024-03-26

## 2024-03-26 RX ORDER — BLOOD-GLUCOSE SENSOR
1 EACH MISCELLANEOUS
Qty: 1 EACH | Refills: 0 | Status: SHIPPED | OUTPATIENT
Start: 2024-03-26

## 2024-03-26 NOTE — PROGRESS NOTES
Shruthi Arroyo is a 59 year old female.  Chief Complaint   Patient presents with    Follow - Up     Lab results, medication review       HPI:   59-year-old female patient presents today for follow up.    Labs: 3/9/24 - to review today    Fatigue   Better sleep - taking Mg glycinate, Pregnenolone 20mg. Has not taken the STEPHANIE yet.  Weight training 3d/wk, treadmill 2d/wk - feeling stronger but not noticing losing weight  More stress in last 2mos    Thyroid antibodies/thyromegaly/nodules -  Still fatigued, dry skin, dry mouth (which phentermine was causing), hoarse voice. Denies changes in mood, constipation, hair.   Ultrasound 8/27/23 -4 nodules, 1 new, stable.     Low DHEA-S and Pregnenolone.  Exercise recovery better. Still no libido. Is gaining more muscle with  now, but still with stagnant weight.  Fatigued by end of afternoon/early evening.     Still with sugar cravings - tries to have fruit after meals in afternoon.    Weight Gain - Hyperinsulinemia  -strong family h/o morbid obesity and has struggled with weight issues most of her life.   -100lb weight loss with [employer] at about 35yo and was able to keep it off for awhile.   -Diagnosed with carcinoma in situ - had radiation and put on tamoxifen x4yrs.    -Ablation at 45yo due to menorrhagia. Quit smoking about that time. Then weight gain started despite exercise and diet changes.   -Discontinued the Phentermine, Ozempic (only on for few months) with Dr. Shannon     GI - regular daily BM, cholecystectomy in 1990s. Occasional bloating, particularly with fruit, so cut out most fruit.     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   H/o HTN now just on diuretic due to LE edema.   Childhood - frequent strep and sinus infections     REVIEW OF SYSTEMS:   Review of Systems   Constitutional:  Positive for fatigue. Negative for chills and fever. Unexpected weight change: weight gain.  HENT:  Positive for voice change (hoarse voice  when stressed). Negative for sore throat.    Eyes: Negative.  Negative for visual disturbance.   Respiratory: Negative.  Negative for cough, shortness of breath and wheezing.    Cardiovascular: Negative.  Negative for chest pain and palpitations.   Gastrointestinal:  Negative for abdominal pain, constipation, diarrhea, nausea and vomiting.   Endocrine: Positive for cold intolerance.   Genitourinary: Negative.    Musculoskeletal: Negative.    Skin: Negative.    Allergic/Immunologic: Negative.    Neurological:  Positive for tremors. Negative for weakness and headaches.   Hematological: Negative.    Psychiatric/Behavioral:  Positive for sleep disturbance.         Labile mood            FAMILY HISTORY:      Family History   Problem Relation Age of Onset    Diabetes Father     Cancer Mother 70        breast     Breast Cancer Mother 70    Hypertension Mother     Cancer Paternal Uncle         stomach CA   Mother, overactive thyroid   All siblings - obesity    MEDICAL HISTORY:     Past Medical History:   Diagnosis Date    Allergic rhinitis, cause unspecified     Allergic rhinitis, cause unspecified     Anxiety 2018    Arthritis     Mild    Bloating     Last few months - occasional    Blurred vision     Last few months.    Body piercing     Ears    Breast cancer (HCC)     Carcinoma in situ of breast 1/3/11    Carpal tunnel syndrome 3/7/11    Chronic rhinitis     Decorative tattoo 2006    Diarrhea, unspecified     Occasional last few months    Essential hypertension, benign 12/22/08    Fatigue     Years. Can fall asleep any time I sit long periods    Flatulence/gas pain/belching     Last 6 months    Frequent UTI 2020    Heart palpitations     Recently gotten worse but has happened for many years    Heavy menses     Until 2011. Had ablasion.    History of cardiac murmur     Most of my life - mild    Hoarseness, chronic     Sometimes its hard to speak. Comes w diff swallowing    Pain in joints     Problems with swallowing      Occ difficulty swallow food. Voice hoarse same time    Stress     Illness and new job    Thyroid disorder     pt. has cysts on thyroids    Tobacco abuse     Unspecified essential hypertension     Wears glasses        CURRENT MEDICATIONS:     Current Outpatient Medications   Medication Sig Dispense Refill    thyroid (NP THYROID) 15 MG Oral Tab Take 1 tablet (15 mg total) by mouth daily. Take 2 tablets in the AM 30-60min away from food, drink. Increase to 3 tabs or 45mg after 2weeks if needed. 90 tablet 1    Continuous Blood Gluc Sensor (FREESTYLE JOSE 3 SENSOR) Does not apply Misc 1 each every 14 (fourteen) days. 1 each 0    Continuous Blood Gluc  (FREESTYLE JOSE 3 READER) Does not apply Misc 1 each every 14 (fourteen) days. 1 each 0    levothyroxine 75 MCG Oral Tab Take 1 tablet (75 mcg total) by mouth before breakfast. Take at least 30-60min away from other food or supplements. 30 tablet 2    triamterene-hydroCHLOROthiazide 75-50 MG Oral Tab Take 0.5 tablets by mouth daily. 45 tablet 1       SOCIAL HISTORY:   Lifestyle Factors affecting health:  Diet - Avoids carbs and gluten. Eating a lot more protein. Egg whites and cottage cheese for breakfast, protein bar or drink for lunch. Chicken and mixed veggies, fruit for dinner. Limits veggies and carbs.    Exercise - regular, meets with , walks dogs  Stress - improved over last 6mos, but fluctuates  Sleep - Better. Had Not been good for years. Up at 4am for work, to bed by 9:30pm.     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    Social History     Socioeconomic History    Marital status:    Tobacco Use    Smoking status: Former     Packs/day: 1.00     Years: 30.00     Additional pack years: 0.00     Total pack years: 30.00     Types: Cigarettes     Quit date: 10/1/2014     Years since quittin.4    Smokeless tobacco: Never   Vaping Use    Vaping Use: Never used   Substance and Sexual Activity    Alcohol use: Yes     Alcohol/week: 3.0 standard drinks of  alcohol     Types: 3 Standard drinks or equivalent per week     Comment: minimal    Drug use: No    Sexual activity: Yes     Partners: Male     Comment: No HIV high risk behavior   Other Topics Concern    Caffeine Concern Yes     Comment: 2 cups a day    Exercise Yes     Comment: 5x/week    Seat Belt Yes       SURGICAL HISTORY:     Past Surgical History:   Procedure Laterality Date    Biopsy of breast, incisional      Breast surgery      Cholecystectomy      Colonoscopy N/A 1/31/2017    Procedure: COLONOSCOPY, POSSIBLE BIOPSY, POSSIBLE POLYPECTOMY 08444;  Surgeon: Luc Calabrese MD;  Location: [facility]    Colonoscopy & polypectomy  1/17- repeat 1/18    mulltiple polyps, tics (adenomas, TVA)    Gastro - dmg  1/17    esophageal nodule (papilloma)    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   Constitutional:       General: She is not in acute distress.     Appearance: Normal appearance.   HENT:      Mouth/Throat:      Mouth: Mucous membranes are moist.      Pharynx: Oropharynx is clear.   Eyes:      Extraocular Movements: Extraocular movements intact.      Conjunctiva/sclera: Conjunctivae normal.   Cardiovascular:      Rate and Rhythm: Normal rate and regular rhythm.      Pulses: Normal pulses.      Heart sounds: Normal heart sounds. No murmur heard.  Pulmonary:      Effort: Pulmonary effort is normal.      Breath sounds: Normal breath sounds.   Abdominal:      General: Bowel sounds are normal. There is no distension.      Palpations: Abdomen is soft.      Tenderness: There is no abdominal tenderness. There is no guarding.   Musculoskeletal:         General: No swelling or deformity.   Skin:      General: Skin is warm and dry.   Neurological:      General: No focal deficit present.      Mental Status: She is alert and oriented to person, place, and time.      Deep Tendon Reflexes: Reflexes abnormal (Patellar R0/L1+, Brachiorad R0/L1+).   Psychiatric:         Mood and Affect: Mood normal.         Behavior: Behavior normal.         Thought Content: Thought content normal.         Judgment: Judgment normal.          ASSESSMENT AND PLAN:     [facility] Lab Encounter on 03/09/2024   Component Date Value Ref Range Status    Free T4 03/09/2024 1.3  0.8 - 1.7 ng/dL Final    If applicable: Pregnancy Reference Intervals  First trimester 10-13 weeks gestation    0.9-1.4 ng/dL  Second trimester 14-26 weeks gestation   0.7-1.3 ng/dL      This test may exhibit interference when a sample is collected from a person who is consuming high dose of biotin (a.k.a., vitamin B7, vitamin H, coenzyme R) supplements resulting in serum concentrations >100 ng/mL.  Intake of the recommended daily allowance (RDA) for biotin (0.03 mg) has not been shown to typically cause significant interference; however, high dose daily dietary supplements may contain biotin concentrations greater than 150 times (5-10 mg) the RDA.  It is recommended that physicians ask all patients who may be on biotin supplementation to stop biotin consumption at least 72 hours prior to collection of a new sample.      TSH 03/09/2024 2.210  0.358 - 3.740 mIU/mL Final    This test may exhibit interference when a sample is collected from a person who is consuming high dose of biotin (a.k.a., vitamin B7, vitamin H, coenzyme R) supplements resulting in serum concentrations >100 ng/mL.  Intake of the recommended daily allowance (RDA) for biotin (0.03 mg) has not been shown to typically cause significant interference; however, high dose daily dietary supplements may contain biotin concentrations greater than 150 times (5-10 mg) the RDA.  It is recommended that physicians ask all  patients who may be on biotin supplementation to stop biotin consumption at least 72 hours prior to collection of a new sample.      T3 Free 03/09/2024 2.49  2.40 - 4.20 pg/mL Final    This test may exhibit interference when a sample is collected from a person who is consuming high dose of biotin (a.k.a., vitamin B7, vitamin H, coenzyme R) supplements resulting in serum concentrations >100 ng/mL.  Intake of the recommended daily allowance (RDA) for biotin (0.03 mg) has not been shown to typically cause significant interference; however, high dose daily dietary supplements may contain biotin concentrations greater than 150 times (5-10 mg) the RDA.  It is recommended that physicians ask all patients who may be on biotin supplementation to stop biotin consumption at least 72 hours prior to collection of a new sample.      Reverse T3 03/09/2024 30.5 (H)  9.2 - 24.1 ng/dL Final    DHEA Sulfate 03/09/2024 92.6  25.9 - 460.2 ug/dL Final    This test may exhibit interference of greater than 10% when a sample is collected from a person who is consuming high dose of biotin (a.k.a., vitamin B7, vitamin H, coenzyme R) supplements resulting in serum concentrations &gt;12.5 ng/mL, leading to either falsely elevated or falsely depressed results.  Intake of the recommended daily allowance (RDA) for biotin (0.03 mg) has not been shown to typically cause significant interference; however, high dose daily dietary supplements may contain biotin concentrations greater than 150 times (5-10 mg) the RDA.  It is recommended that physicians ask all patients who may be on biotin supplementation to stop biotin consumption at least 72 hours prior to collection of a new sample.    Pregnenolone 03/09/2024 30  ng/dL Final    This test was developed and its performance characteristics  determined by [facility]. It has not been cleared or approved  by the Food and Drug Administration.  Reference Range:  Adults: <151    Anti-Thyroperoxidase 03/09/2024  687 (H)  <60 U/mL Final    Anti-Thyroglobulin 03/09/2024 146 (H)  <60 U/mL Final    This test may exhibit interference when a sample is collected from a person who is consuming high dose of biotin (a.k.a., vitamin B7, vitamin H, coenzyme R) supplements resulting in serum concentrations >=100 ng/mL, leading to falsely depressed Thyroglobulin results (32% - 37% decrease).  Intake of the recommended daily allowance (RDA) for biotin (0.03 mg) has not been shown to typically cause significant interference; however, high dose daily dietary supplements may contain biotin concentrations greater than 150 times (5-10 mg) the RDA.  It is recommended that physicians ask all patients who may be on biotin supplementation to stop biotin consumption at least 72 hours prior to collection of a new sample.     [facility] Lab Encounter on 12/21/2023   Component Date Value Ref Range Status    TSH 12/21/2023 3.640  0.358 - 3.740 mIU/mL Final    This test may exhibit interference when a sample is collected from a person who is consuming high dose of biotin (a.k.a., vitamin B7, vitamin H, coenzyme R) supplements resulting in serum concentrations >100 ng/mL.  Intake of the recommended daily allowance (RDA) for biotin (0.03 mg) has not been shown to typically cause significant interference; however, high dose daily dietary supplements may contain biotin concentrations greater than 150 times (5-10 mg) the RDA.  It is recommended that physicians ask all patients who may be on biotin supplementation to stop biotin consumption at least 72 hours prior to collection of a new sample.      Free T4 12/21/2023 1.2  0.8 - 1.7 ng/dL Final    If applicable: Pregnancy Reference Intervals  First trimester 10-13 weeks gestation    0.9-1.4 ng/dL  Second trimester 14-26 weeks gestation   0.7-1.3 ng/dL      This test may exhibit interference when a sample is collected from a person who is consuming high dose of biotin (a.k.a., vitamin B7, vitamin H, coenzyme R)  supplements resulting in serum concentrations >100 ng/mL.  Intake of the recommended daily allowance (RDA) for biotin (0.03 mg) has not been shown to typically cause significant interference; however, high dose daily dietary supplements may contain biotin concentrations greater than 150 times (5-10 mg) the RDA.  It is recommended that physicians ask all patients who may be on biotin supplementation to stop biotin consumption at least 72 hours prior to collection of a new sample.      T3 Free 12/21/2023 2.63  2.40 - 4.20 pg/mL Final    This test may exhibit interference when a sample is collected from a person who is consuming high dose of biotin (a.k.a., vitamin B7, vitamin H, coenzyme R) supplements resulting in serum concentrations >100 ng/mL.  Intake of the recommended daily allowance (RDA) for biotin (0.03 mg) has not been shown to typically cause significant interference; however, high dose daily dietary supplements may contain biotin concentrations greater than 150 times (5-10 mg) the RDA.  It is recommended that physicians ask all patients who may be on biotin supplementation to stop biotin consumption at least 72 hours prior to collection of a new sample.      Reverse T3 12/21/2023 31.4 (H)  9.2 - 24.1 ng/dL Final   [facility] Lab Encounter on 10/30/2023   Component Date Value Ref Range Status    TSH 10/30/2023 3.880 (H)  0.358 - 3.740 mIU/mL Final    This test may exhibit interference when a sample is collected from a person who is consuming high dose of biotin (a.k.a., vitamin B7, vitamin H, coenzyme R) supplements resulting in serum concentrations >100 ng/mL.  Intake of the recommended daily allowance (RDA) for biotin (0.03 mg) has not been shown to typically cause significant interference; however, high dose daily dietary supplements may contain biotin concentrations greater than 150 times (5-10 mg) the RDA.  It is recommended that physicians ask all patients who may be on biotin supplementation to stop biotin  consumption at least 72 hours prior to collection of a new sample.      Free T4 10/30/2023 1.2  0.8 - 1.7 ng/dL Final    If applicable: Pregnancy Reference Intervals  First trimester 10-13 weeks gestation    0.9-1.4 ng/dL  Second trimester 14-26 weeks gestation   0.7-1.3 ng/dL      This test may exhibit interference when a sample is collected from a person who is consuming high dose of biotin (a.k.a., vitamin B7, vitamin H, coenzyme R) supplements resulting in serum concentrations >100 ng/mL.  Intake of the recommended daily allowance (RDA) for biotin (0.03 mg) has not been shown to typically cause significant interference; however, high dose daily dietary supplements may contain biotin concentrations greater than 150 times (5-10 mg) the RDA.  It is recommended that physicians ask all patients who may be on biotin supplementation to stop biotin consumption at least 72 hours prior to collection of a new sample.      T3 Free 10/30/2023 2.68  2.40 - 4.20 pg/mL Final    This test may exhibit interference when a sample is collected from a person who is consuming high dose of biotin (a.k.a., vitamin B7, vitamin H, coenzyme R) supplements resulting in serum concentrations >100 ng/mL.  Intake of the recommended daily allowance (RDA) for biotin (0.03 mg) has not been shown to typically cause significant interference; however, high dose daily dietary supplements may contain biotin concentrations greater than 150 times (5-10 mg) the RDA.  It is recommended that physicians ask all patients who may be on biotin supplementation to stop biotin consumption at least 72 hours prior to collection of a new sample.      Reverse T3 10/30/2023 17.9  9.2 - 24.1 ng/dL Final      No results found.    1. Hypothyroidism due to Hashimoto's thyroiditis  - Free T4, (Free Thyroxine); Future  - Assay, Thyroid Stim Hormone; Future  - Free T3 (Triiodothryronine); Future  - Iodine and Creatinine, Urine 24hr; Future  - Reverse T3, Serum; Future    2.  Endocrine axis dysfunction  - Testosterone,Total and Weakly Bound w/ SHBG; Future  - Progesterone; Future  - Estrogens Fractionated, Serum; Future  - Dehydroepiandrosterone Sulfate; Future  - Pregnenolone by MS/MS, Serum; Future  - Cortisol; Future    3. Multiple thyroid nodules  - Free T4, (Free Thyroxine); Future  - Assay, Thyroid Stim Hormone; Future  - Free T3 (Triiodothryronine); Future  - Iodine and Creatinine, Urine 24hr; Future  - Reverse T3, Serum; Future    4. Chronic fatigue    5. Thyroid antibody positive    6. Insomnia, unspecified type    7. Insulin resistance  - Insulin; Future  - Hemoglobin A1C; Future  - Continuous Blood Gluc Sensor (FREESTYLE JOSE 3 SENSOR) Does not apply Misc; 1 each every 14 (fourteen) days.  Dispense: 1 each; Refill: 0  - Continuous Blood Gluc  (FREESTYLE JOSE 3 READER) Does not apply Misc; 1 each every 14 (fourteen) days.  Dispense: 1 each; Refill: 0    8. Low libido  - Testosterone,Total and Weakly Bound w/ SHBG; Future    9. BMI 29.0-29.9,adult      Time spent with patient: Over 50 minutes spent in chart review and in direct communication with patient obtaining and reviewing history, creating a unique care plan, explaining the rationale for treatment, reviewing potential SE and overall treatment plan,  documenting all clinical information in Epic. Over 50% of this time was in education, counseling and coordination of care.     Problem List Items Addressed This Visit          Endocrine and Metabolic    Multiple thyroid nodules    Relevant Medications    thyroid (NP THYROID) 15 MG Oral Tab    Other Relevant Orders    Free T4, (Free Thyroxine)    Assay, Thyroid Stim Hormone    Free T3 (Triiodothryronine)    Iodine and Creatinine, Urine 24hr    Reverse T3, Serum     Other Visit Diagnoses       Hypothyroidism due to Hashimoto's thyroiditis    -  Primary    Relevant Medications    thyroid (NP THYROID) 15 MG Oral Tab    Other Relevant Orders    Free T4, (Free Thyroxine)     Assay, Thyroid Stim Hormone    Free T3 (Triiodothryronine)    Iodine and Creatinine, Urine 24hr    Reverse T3, Serum    Endocrine axis dysfunction        Relevant Medications    thyroid (NP THYROID) 15 MG Oral Tab    Other Relevant Orders    Testosterone,Total and Weakly Bound w/ SHBG    Progesterone    Estrogens Fractionated, Serum    Dehydroepiandrosterone Sulfate    Pregnenolone by MS/MS, Serum    Cortisol    Chronic fatigue        Thyroid antibody positive        Insomnia, unspecified type        Insulin resistance        Relevant Medications    Continuous Blood Gluc Sensor (FREESTYLE JOSE 3 SENSOR) Does not apply Misc    Continuous Blood Gluc  (FREESTYLE JOSE 3 READER) Does not apply Misc    Other Relevant Orders    Insulin    Hemoglobin A1C    Low libido        Relevant Orders    Testosterone,Total and Weakly Bound w/ SHBG    BMI 29.0-29.9,adult        Relevant Medications    thyroid (NP THYROID) 15 MG Oral Tab             Endocrine dysfunction:  Hashimoto's hypothyroid-thyroid antibodies, TSH and free T4 have improved on levothyroxine, but suspect poor conversion to T3 as her free T3 are still low and her reverse T3 has increased, along with the fact she is still experiencing underactive symptoms.  Plan is to switch to NP thyroid and recheck labs in 4-6wks.    Low adrenal hormones-DHEA-S and pregnenolone improving but not optimal.  Increase Pregnenolone to 30 mg every night.  Most likely due to increased stress.   Craves sugar and tries to watch any carbohydrate intake.   -> Recommend starting Relora  -> Suspect low nutrient intake with restricted diet.  Discussed adding in more complex carbohydrates and healthy fats to support energy levels, hormones, and daily workouts.  Handouts from detox and renew plan shared for meal ideas.    Insomnia- improved, may start STEPHANIE as needed.    ?  Menopausal -low libido.  Ablation at 46 years old.  -> Check hormones at next blood draw    Resistant weight loss due  to all of the above as well as ongoing inflammation (stress and possible food sensitivities suspected).   -> Start metabolic maintenance detox shakes for nutrients and optimizing detoxification pathways  -> CGM to monitor glucose levels  -> Check hemoglobin A1c and insulin    Follow-up in 3 months.  Follow-up lab testing in 4 to 6 weeks    Orders Placed This Visit:  Orders Placed This Encounter   Procedures    Free T4, (Free Thyroxine)    Assay, Thyroid Stim Hormone    Free T3 (Triiodothryronine)    Iodine and Creatinine, Urine 24hr    Reverse T3, Serum    Insulin    Hemoglobin A1C    Testosterone,Total and Weakly Bound w/ SHBG    Progesterone    Estrogens Fractionated, Serum    Dehydroepiandrosterone Sulfate    Pregnenolone by MS/MS, Serum    Cortisol     No orders of the defined types were placed in this encounter.      Patient Instructions   Relora by Pure Encapsulations:      Relora by Pure [employer]caps[employer]: Relora is a plant-based formula designed to help the central nervous system relax and moderate occasional stress. Relora also helps support emotional well-being and diminish stress-related sugary cravings to promote healthy eating habits.    Dose: Take 3 capsules per day, in divided doses, with or between meals.     2. PhytoMulti:       Metagenics Phytomulti - Multivitamin with plant based antioxidant support       Dose: 2  tabs daily with food, Avoid taking on empty stomach.     3. Metabolic Detox Complete Natural Chocolate by Metabolic Maintenance    Metabolic Detox Complete, is an excellent supplement for comprehensive detoxification programs and elimination diets. It contains a hypoallergenic blend of pea, rice, and hemp, non-GMO proteins, beneficial medium chain triglycerides, and omega-3 fatty acids. Metabolic Detox® Complete also includes a balanced combination of vitamins, minerals, essential amino acids, and other beneficial nutrient cofactors to simplify and support both Phase I and Phase II  detoxification. With 20 grams of protein per serving, it makes an excellent meal replacement or a daily protein shake.*    Provides 20 grams of clean protein for sustained energy*  Nourishes Phase I and II detoxification pathways*  Enhanced with liver protective silymarin and green tea plant extracts      Suggested Use:  Blend, shake or stir 2 scoops (50 grams) into 8-12 ounces of water - or preferably almond milk, 1-2 times per day.     4. Continuous Glucose Monitor - check after eating, working out, walking, upon waking, etc.    5. Increase Pregnenolone to 30mg at night    6. Get labs drawn 4weeks after thyroid medication change. Get drawn in the morning after fasting overnight.    7.  Switch from Levothyroxine to NP Thyroid. Start with 2 tablets every AM and if not feeling better then can increase to 3tablets after 2weeks. Please let us know after 2 weeks how you are doing.     8. Consider Food Testing:  [facility] [place].     The  Test is a patented, multi-pathway delayed food sensitivity test. The test uses patented technology that measures both IgG and Immune Complexes, the most common food-related pathways in the body. This enables the FIT test to be able to identify, food sensitivities, inflammation and leaky gut from a single test.    Food Sensitivities affect more than 100 million people worldwide. They are very difficult to identify because the symptoms can be delayed up to 72 hours after eating.    Go Here to see the foods that are tested with each one: https://[employer]/wp-content/uploads/[facility] [place]Foods[place]We_Test.pdf           Return in about 3 months (around 6/26/2024) for x60min: thyroid, weight loss.    Patient affirmed understanding of plan and all questions were answered.     Daniela Forrest PA-C

## 2024-03-26 NOTE — PATIENT INSTRUCTIONS
Relora by Pure Encapsulations:      Relora by [employer] Encaps[employer]: Relora is a plant-based formula designed to help the central nervous system relax and moderate occasional stress. Relora also helps support emotional well-being and diminish stress-related sugary cravings to promote healthy eating habits.    Dose: Take 3 capsules per day, in divided doses, with or between meals.     2. PhytoMulti:       [employer] Phyt[employer] - Multivitamin with plant based antioxidant support       Dose: 2  tabs daily with food, Avoid taking on empty stomach.     3. Metabolic Detox Complete Natural Chocolate by Metabolic Maintenance    Metabolic Detox Complete, is an excellent supplement for comprehensive detoxification programs and elimination diets. It contains a hypoallergenic blend of pea, rice, and hemp, non-GMO proteins, beneficial medium chain triglycerides, and omega-3 fatty acids. Metabolic Detox® Complete also includes a balanced combination of vitamins, minerals, essential amino acids, and other beneficial nutrient cofactors to simplify and support both Phase I and Phase II detoxification. With 20 grams of protein per serving, it makes an excellent meal replacement or a daily protein shake.*    Provides 20 grams of clean protein for sustained energy*  Nourishes Phase I and II detoxification pathways*  Enhanced with liver protective silymarin and green tea plant extracts      Suggested Use:  Blend, shake or stir 2 scoops (50 grams) into 8-12 ounces of water - or preferably almond milk, 1-2 times per day.     4. Continuous Glucose Monitor - check after eating, working out, walking, upon waking, etc.    5. Increase Pregnenolone to 30mg at night    6. Get labs drawn 4weeks after thyroid medication change. Get drawn in the morning after fasting overnight.    7.  Switch from Levothyroxine to NP Thyroid. Start with 2 tablets every AM and if not feeling better then can increase to 3tablets after 2weeks. Please let us know after 2  weeks how you are doing.     8. Consider Food Testing:  [facility] [place] [place] FIT testing for identifying food sensitivities.     The  Test is a patented, multi-pathway delayed food sensitivity test. The test uses patented technology that measures both IgG and Immune Complexes, the most common food-related pathways in the body. This enables the FIT test to be able to identify, food sensitivities, inflammation and leaky gut from a single test.    Food Sensitivities affect more than 100 million people worldwide. They are very difficult to identify because the symptoms can be delayed up to 72 hours after eating.    Go Here to see the foods that are tested with each one: [email or web address]/wp-content/uploads/[facility] [place] [place]_Foods_We_Test.pdf

## 2024-03-27 RX ORDER — THYROID 15 MG/1
TABLET ORAL
Qty: 90 TABLET | Refills: 1 | Status: SHIPPED | OUTPATIENT
Start: 2024-03-27

## 2024-04-24 ENCOUNTER — HOSPITAL ENCOUNTER (OUTPATIENT)
Age: 59
Discharge: HOME | End: 2024-04-24
Payer: COMMERCIAL

## 2024-04-24 VITALS
HEART RATE: 68 BPM | DIASTOLIC BLOOD PRESSURE: 75 MMHG | TEMPERATURE: 98 F | OXYGEN SATURATION: 100 % | RESPIRATION RATE: 16 BRPM | SYSTOLIC BLOOD PRESSURE: 133 MMHG

## 2024-04-24 DIAGNOSIS — H65.192 ACUTE EFFUSION OF LEFT EAR: Primary | ICD-10-CM

## 2024-04-24 PROCEDURE — 99213 OFFICE O/P EST LOW 20 MIN: CPT | Performed by: NURSE PRACTITIONER

## 2024-04-24 RX ORDER — NAPROXEN 500 MG/1
500 TABLET ORAL 2 TIMES DAILY WITH MEALS
Qty: 14 TABLET | Refills: 0 | Status: SHIPPED | OUTPATIENT
Start: 2024-04-24 | End: 2024-05-01

## 2024-04-24 NOTE — ED PROVIDER NOTES
Patient Seen in: [facility]      History     Chief Complaint   Patient presents with    Ear Problem     Ear pain and neck pain on left side. - Entered by patient     Stated Complaint: Ear Problem - Ear pain and neck pain on left side.    Subjective:   59-year-old female complaining of pain to the left ear that radiates down to the left side of the neck.  States she has had the pain this past Sunday.  No drainage.  Denies stuffy or runny nose.  No known fevers.  No recent travel.  States she has been taking Advil which helps a bit.  Describes the pain as a feeling of fullness.  States that her hearing does not seem to be diminished.  Denies pain to the mastoid bone.            Objective:   Past Medical History:    Allergic rhinitis, cause unspecified    Allergic rhinitis, cause unspecified    Anxiety    Arthritis    Mild    Bloating    Last few months - occasional    Blurred vision    Last few months.    Body piercing    Ears    Breast cancer (HCC)    Carcinoma in situ of breast    Carpal tunnel syndrome    Chronic rhinitis    Decorative tattoo    Diarrhea, unspecified    Occasional last few months    Essential hypertension, benign    Fatigue    Years. Can fall asleep any time I sit long periods    Flatulence/gas pain/belching    Last 6 months    Frequent UTI    Heart palpitations    Recently gotten worse but has happened for many years    Heavy menses    Until 2011. Had ablasion.    History of cardiac murmur    Most of my life - mild    Hoarseness, chronic    Sometimes its hard to speak. Comes w diff swallowing    Pain in joints    Problems with swallowing    Occ difficulty swallow food. Voice hoarse same time    Stress    Illness and new job    Thyroid disorder    pt. has cysts on thyroids    Tobacco abuse    Unspecified essential hypertension    Wears glasses              Past Surgical History:   Procedure Laterality Date    Biopsy of breast, incisional      Breast surgery      Cholecystectomy       Colonoscopy N/A 2017    Procedure: COLONOSCOPY, POSSIBLE BIOPSY, POSSIBLE POLYPECTOMY 73401;  Surgeon: Luc Calabrese MD;  Location: [facility]    Colonoscopy & polypectomy  - repeat     mulltiple polyps, tics (adenomas, TVA)    Gastro - dmg      esophageal nodule (papilloma)    Lumpectomy left  2011    DCIS    Kwame localization wire 1 site right (cpt=19281) Right     BENIGN   Late 90's     Radiation left Left         Removal gallbladder                  Social History     Socioeconomic History    Marital status:    Tobacco Use    Smoking status: Former     Current packs/day: 0.00     Average packs/day: 1 pack/day for 30.0 years (30.0 ttl pk-yrs)     Types: Cigarettes     Start date: 10/1/1984     Quit date: 10/1/2014     Years since quittin.5    Smokeless tobacco: Never   Vaping Use    Vaping status: Never Used   Substance and Sexual Activity    Alcohol use: Yes     Alcohol/week: 3.0 standard drinks of alcohol     Types: 3 Standard drinks or equivalent per week     Comment: minimal    Drug use: No    Sexual activity: Yes     Partners: Male     Comment: No HIV high risk behavior   Other Topics Concern    Caffeine Concern Yes     Comment: 2 cups a day    Exercise Yes     Comment: 5x/week    Seat Belt Yes     Social Determinants of Health      [facility]    Social Connections    [facility] from [facility]    Housing Stability              Review of Systems   Constitutional: Negative.    HENT:  Positive for ear pain. Negative for congestion, ear discharge and rhinorrhea.    All other systems reviewed and are negative.      Positive for stated complaint: Ear Problem - Ear pain and neck pain on left side.  Other systems are as noted in HPI.  Constitutional and vital signs reviewed.      All other systems reviewed and negative except as noted above.    Physical  Exam     ED Triage Vitals [04/24/24 1642]   /75   Pulse 68   Resp 16   Temp 97.5 °F (36.4 °C)   Temp src Temporal   SpO2 100 %   O2 Device        Current:/75   Pulse 68   Temp 97.5 °F (36.4 °C) (Temporal)   Resp 16   SpO2 100%         Physical Exam  Vitals and nursing note reviewed.   Constitutional:       General: She is not in acute distress.     Appearance: Normal appearance. She is not ill-appearing, toxic-appearing or diaphoretic.   HENT:      Head:      Jaw: No trismus.      Right Ear: Tympanic membrane, ear canal and external ear normal.      Left Ear: Ear canal and external ear normal. A middle ear effusion is present. There is no impacted cerumen. No foreign body. No mastoid tenderness. No hemotympanum. Tympanic membrane is not injected, erythematous or bulging.      Mouth/Throat:      Lips: Pink.      Mouth: Mucous membranes are moist. No angioedema.      Pharynx: Uvula midline. No pharyngeal swelling or posterior oropharyngeal erythema.   Pulmonary:      Effort: No respiratory distress.   Skin:     Coloration: Skin is not pale.   Neurological:      Mental Status: She is alert.               ED Course   Labs Reviewed - No data to display                   MDM                                         Medical Decision Making  Differential diagnosis initially included but was not limited to: Otitis media, otitis externa mastoiditis, cerumen impaction    59-year-old female with left ear pain.  No mastoid  tenderness, edema, swelling, unlikely to be mastoiditis.  Physical exam does not support the finding of cerumen impaction or otitis externa.  Does not support physical exam findings of otitis media.  There is fluid behind the left eardrum, consistent with effusion.  However does not look infected.  I did offer oral prednisone, which she states she cannot take it as it makes her whole body swell up.  Will prescribe her naproxen instead.  If no improvement, follow-up with ENT.    Supportive/home  management of diagnosis/illness/injury discussed. Red flag symptoms discussed.  Signs and symptoms/criteria that would necessitate reevaluation, including ER evaluation discussed.  Patient and/or responsible adult verbalize and agree with management and plan of care.    Speech recognition software was used during this dictation.  There may be minor errors in transcription.      Risk  Prescription drug management.        Disposition and Plan     Clinical Impression:  1. Acute effusion of left ear         Disposition:  Discharge  4/24/2024  4:53 pm    Follow-up:  Singh Ryder DO  [address] [facility] [address]  [phone]    Call in 5 days      Alex Coronado MD  [address] [facility] [id number] [phone]    Call in 5 days  As needed no improvement.  ENT recommendation.          Medications Prescribed:  Discharge Medication List as of 4/24/2024  4:55 PM        START taking these medications    Details   naproxen 500 MG Oral Tab Take 1 tablet (500 mg total) by mouth 2 (two) times daily with meals for 7 days., Normal, Disp-14 tablet, R-0

## 2024-05-05 ENCOUNTER — PATIENT MESSAGE (OUTPATIENT)
Dept: INTEGRATIVE MEDICINE | Facility: CLINIC | Age: 59
End: 2024-05-05

## 2024-05-10 NOTE — TELEPHONE ENCOUNTER
Spoke with patient, informed her of below. Patient expresses understanding, the fit test 132 and 176 was placed in christ for pt along with codes to check for insurance coverage. Patient is aware to call office back once she decides which test she would like to proceed with.

## 2024-05-10 NOTE — TELEPHONE ENCOUNTER
From: Shruthi Arroyo  To: Daniela Forrest  Sent: 5/5/2024 9:28 AM CDT  Subject: Bloodwork     Good morning.   I have set my follow up bloodwork for May 18th. I cannot see the referral so I just wanted to make sure it was done.     Also Daniela wanted to know if I bumped the NP Thyroid med to 3 per day and I did because I was not feeling any different. Its been couple weeks and still feeling quite tired most days.   Also bumped Pregnenolone to 3 per evening.

## 2024-05-14 RX ORDER — TRIAMTERENE AND HYDROCHLOROTHIAZIDE 75; 50 MG/1; MG/1
0.5 TABLET ORAL DAILY
Qty: 45 TABLET | Refills: 0 | Status: SHIPPED | OUTPATIENT
Start: 2024-05-14

## 2024-05-14 NOTE — TELEPHONE ENCOUNTER
Hypertension Medications Protocol [id number]/14/2024 10:06 AM   Protocol Details CMP or BMP in past 12 months    Last BP reading less than 140/90    In person appointment or virtual visit in the past 12 mos or appointment in next 3 mos    EGFRCR or GFRNAA > 50     Refilled per protocol  triamterene-hydroCHLOROthiazide 75-50 MG Oral Tab   Last refilled on 11/25/23 #45 with 1 rf.  LOV- 8/7/23  Last labs- 8/25/23    Sent to pharmacy

## 2024-05-18 ENCOUNTER — LAB ENCOUNTER (OUTPATIENT)
Dept: LAB | Age: 59
End: 2024-05-18
Attending: PHYSICIAN ASSISTANT

## 2024-05-18 DIAGNOSIS — R68.82 LOW LIBIDO: ICD-10-CM

## 2024-05-18 DIAGNOSIS — E06.3 HYPOTHYROIDISM DUE TO HASHIMOTO'S THYROIDITIS: ICD-10-CM

## 2024-05-18 DIAGNOSIS — E88.819 INSULIN RESISTANCE: ICD-10-CM

## 2024-05-18 DIAGNOSIS — E34.8 ENDOCRINE AXIS DYSFUNCTION: ICD-10-CM

## 2024-05-18 DIAGNOSIS — E04.2 MULTIPLE THYROID NODULES: ICD-10-CM

## 2024-05-18 DIAGNOSIS — E03.8 HYPOTHYROIDISM DUE TO HASHIMOTO'S THYROIDITIS: ICD-10-CM

## 2024-05-18 LAB
CORTIS SERPL-MCNC: 9.6 UG/DL
DHEA-S SERPL-MCNC: 96.8 UG/DL
EST. AVERAGE GLUCOSE BLD GHB EST-MCNC: 108 MG/DL (ref 68–126)
HBA1C MFR BLD: 5.4 % (ref ?–5.7)
INSULIN SERPL-ACNC: 9.6 MU/L (ref 3–25)
PROGEST SERPL-MCNC: 0.66 NG/ML
T3FREE SERPL-MCNC: 2.35 PG/ML (ref 2.4–4.2)
T4 FREE SERPL-MCNC: 0.8 NG/DL (ref 0.8–1.7)
TSI SER-ACNC: 4.21 MIU/ML (ref 0.36–3.74)

## 2024-05-18 PROCEDURE — 84482 T3 REVERSE: CPT

## 2024-05-18 PROCEDURE — 82627 DEHYDROEPIANDROSTERONE: CPT

## 2024-05-18 PROCEDURE — 82671 ASSAY OF ESTROGENS: CPT

## 2024-05-18 PROCEDURE — 84439 ASSAY OF FREE THYROXINE: CPT

## 2024-05-18 PROCEDURE — 84443 ASSAY THYROID STIM HORMONE: CPT

## 2024-05-18 PROCEDURE — 83036 HEMOGLOBIN GLYCOSYLATED A1C: CPT

## 2024-05-18 PROCEDURE — 82533 TOTAL CORTISOL: CPT

## 2024-05-18 PROCEDURE — 84481 FREE ASSAY (FT-3): CPT

## 2024-05-18 PROCEDURE — 84140 ASSAY OF PREGNENOLONE: CPT

## 2024-05-18 PROCEDURE — 84144 ASSAY OF PROGESTERONE: CPT

## 2024-05-18 PROCEDURE — 84410 TESTOSTERONE BIOAVAILABLE: CPT

## 2024-05-18 PROCEDURE — 36415 COLL VENOUS BLD VENIPUNCTURE: CPT

## 2024-05-18 PROCEDURE — 83525 ASSAY OF INSULIN: CPT

## 2024-05-20 RX ORDER — THYROID 15 MG/1
45 TABLET ORAL DAILY
Qty: 90 TABLET | Refills: 2 | Status: SHIPPED | OUTPATIENT
Start: 2024-05-20

## 2024-05-20 NOTE — TELEPHONE ENCOUNTER
A refill request was received for:  Requested Prescriptions     Pending Prescriptions Disp Refills    NP THYROID 15 MG Oral Tab [Pharmacy Med Name: NP Thyroid 15 MG Oral Tablet] 90 tablet 0     Sig: TAKE 2 TABLETS BY MOUTH IN THE MORNING 30-60 MINUTES AWAY FROM FOOD, DRINK. INCREASE TO 3 TABLETS OR 45MG AFTER 2 WEEKS IF NEEDED     Last refill date:  3/27/24  Qty: 90 and 1   Dx: hypothyroidism  Last office visit: 3/26/24   When is follow up due: 6/26/24         Future Appointments   Date Time Provider Department Center   8/13/2024  2:30 PM Daniela Forrest PA-C [facility] [place]

## 2024-05-21 LAB
ESTRADIOL: 14.3 PG/ML
ESTRONE: 39 PG/ML

## 2024-05-23 LAB — REVERSE T3: 16.2 NG/DL

## 2024-05-24 LAB
SEX HORM BIND GLOB: 65.5 NMOL/L
TESTOST % FREE+WEAK BND: 6.5 %
TESTOST FREE+WEAK BND: 2.5 NG/DL
TESTOSTERONE TOT /MS: 38.1 NG/DL

## 2024-05-28 LAB — PREGNENOLONE: 40 NG/DL

## 2024-06-08 RX ORDER — THYROID 60 MG/1
60 TABLET ORAL DAILY
Qty: 30 TABLET | Refills: 2 | Status: SHIPPED | OUTPATIENT
Start: 2024-06-08

## 2024-06-18 ENCOUNTER — PATIENT MESSAGE (OUTPATIENT)
Dept: INTEGRATIVE MEDICINE | Facility: CLINIC | Age: 59
End: 2024-06-18

## 2024-06-26 NOTE — TELEPHONE ENCOUNTER
Spoke with patient and explained the difference between fit test 132 and 176. Patient states she will call her insurance and verify if any of the test are covered with insurance. Patient will send a message once she decides which test to do.

## 2024-06-28 DIAGNOSIS — Z12.31 BREAST CANCER SCREENING BY MAMMOGRAM: Primary | ICD-10-CM

## 2024-06-28 DIAGNOSIS — R10.84 GENERALIZED ABDOMINAL PAIN: Primary | ICD-10-CM

## 2024-07-20 ENCOUNTER — LAB ENCOUNTER (OUTPATIENT)
Dept: LAB | Age: 59
End: 2024-07-20
Attending: FAMILY MEDICINE
Payer: COMMERCIAL

## 2024-07-20 DIAGNOSIS — R10.84 GENERALIZED ABDOMINAL PAIN: ICD-10-CM

## 2024-07-20 PROCEDURE — 86003 ALLG SPEC IGE CRUDE XTRC EA: CPT

## 2024-07-20 PROCEDURE — 36415 COLL VENOUS BLD VENIPUNCTURE: CPT

## 2024-07-20 PROCEDURE — 82785 ASSAY OF IGE: CPT

## 2024-07-24 LAB
ALLERGEN BRAZIL NUT: <0.1 KUA/L (ref ?–0.1)
ALMOND IGE QN: <0.1 KUA/L (ref ?–0.1)
CASHEW NUT IGE QN: <0.1 KUA/L (ref ?–0.1)
CLAM IGE QN: <0.1 KUA/L (ref ?–0.1)
CODFISH IGE QN: <0.1 KUA/L (ref ?–0.1)
CORN IGE QN: <0.1 KUA/L (ref ?–0.1)
COW MILK IGE QN: <0.1 KUA/L (ref ?–0.1)
EGG WHITE IGE QN: <0.1 KUA/L (ref ?–0.1)
HAZELNUT IGE QN: <0.1 KUA/L (ref ?–0.1)
IGE SERPL-ACNC: 75.5 KU/L (ref 2–214)
PEANUT IGE QN: <0.1 KUA/L (ref ?–0.1)
SALMON IGE QN: <0.1 KUA/L (ref ?–0.1)
SCALLOP IGE QN: <0.1 KUA/L (ref ?–0.1)
SESAME SEED IGE QN: <0.1 KUA/L (ref ?–0.1)
SHRIMP IGE QN: <0.1 KUA/L (ref ?–0.1)
SOYBEAN IGE QN: <0.1 KUA/L (ref ?–0.1)
WALNUT IGE QN: <0.1 KUA/L (ref ?–0.1)
WHEAT IGE QN: <0.1 KUA/L (ref ?–0.1)

## 2024-07-25 ENCOUNTER — PATIENT MESSAGE (OUTPATIENT)
Dept: INTEGRATIVE MEDICINE | Facility: CLINIC | Age: 59
End: 2024-07-25

## 2024-07-25 ENCOUNTER — PATIENT MESSAGE (OUTPATIENT)
Dept: FAMILY MEDICINE CLINIC | Facility: CLINIC | Age: 59
End: 2024-07-25

## 2024-07-25 DIAGNOSIS — E06.3 HYPOTHYROIDISM DUE TO HASHIMOTO'S THYROIDITIS: Primary | ICD-10-CM

## 2024-07-25 DIAGNOSIS — R76.8 THYROID ANTIBODY POSITIVE: ICD-10-CM

## 2024-07-25 NOTE — TELEPHONE ENCOUNTER
From: Shruthi Arroyo  Sent: 7/25/2024 10:10 AM CDT  To: [facility] Clinical Staff  Subject: Allergy labs    Yes to Daniela Forrest please.     Thank you!

## 2024-07-27 NOTE — TELEPHONE ENCOUNTER
From: Shruthi Arroyo  To: Daniela Forrest  Sent: 7/25/2024 8:08 AM CDT  Subject: Food allergy test    Morning. I received the results of the food allergy test done on Saturday. They could not complete a thyroid panel because they did not see an order. It looks like the allergy test is not showing any issues and to date I am still not losing any weight.   Since the allergy test had to be requested by my PCP I wasn't sure if your office would be made aware.    Please let me know how we can proceed.    Thank you!  Alison Arroyo

## 2024-08-10 ENCOUNTER — LAB ENCOUNTER (OUTPATIENT)
Dept: LAB | Age: 59
End: 2024-08-10
Attending: PHYSICIAN ASSISTANT
Payer: COMMERCIAL

## 2024-08-10 DIAGNOSIS — E06.3 HYPOTHYROIDISM DUE TO HASHIMOTO'S THYROIDITIS: ICD-10-CM

## 2024-08-10 DIAGNOSIS — R76.8 THYROID ANTIBODY POSITIVE: ICD-10-CM

## 2024-08-10 LAB
T3FREE SERPL-MCNC: 3.22 PG/ML (ref 2.4–4.2)
T4 FREE SERPL-MCNC: 1.1 NG/DL (ref 0.8–1.7)
THYROGLOB SERPL-MCNC: 149 U/ML (ref ?–60)
THYROPEROXIDASE AB SERPL-ACNC: 719 U/ML (ref ?–60)
TSI SER-ACNC: 3.56 MIU/ML (ref 0.55–4.78)

## 2024-08-10 PROCEDURE — 84481 FREE ASSAY (FT-3): CPT

## 2024-08-10 PROCEDURE — 86376 MICROSOMAL ANTIBODY EACH: CPT

## 2024-08-10 PROCEDURE — 84443 ASSAY THYROID STIM HORMONE: CPT

## 2024-08-10 PROCEDURE — 84439 ASSAY OF FREE THYROXINE: CPT

## 2024-08-10 PROCEDURE — 86800 THYROGLOBULIN ANTIBODY: CPT

## 2024-08-10 PROCEDURE — 84482 T3 REVERSE: CPT

## 2024-08-10 PROCEDURE — 36415 COLL VENOUS BLD VENIPUNCTURE: CPT

## 2024-08-16 LAB — REVERSE T3: 17.7 NG/DL

## 2024-08-26 ENCOUNTER — PATIENT MESSAGE (OUTPATIENT)
Dept: FAMILY MEDICINE CLINIC | Facility: CLINIC | Age: 59
End: 2024-08-26

## 2024-08-26 ENCOUNTER — TELEPHONE (OUTPATIENT)
Dept: FAMILY MEDICINE CLINIC | Facility: CLINIC | Age: 59
End: 2024-08-26

## 2024-08-26 ENCOUNTER — OFFICE VISIT (OUTPATIENT)
Dept: FAMILY MEDICINE CLINIC | Facility: CLINIC | Age: 59
End: 2024-08-26
Payer: COMMERCIAL

## 2024-08-26 VITALS
BODY MASS INDEX: 30.31 KG/M2 | HEIGHT: 68 IN | DIASTOLIC BLOOD PRESSURE: 82 MMHG | OXYGEN SATURATION: 97 % | RESPIRATION RATE: 16 BRPM | SYSTOLIC BLOOD PRESSURE: 118 MMHG | HEART RATE: 118 BPM | WEIGHT: 200 LBS | TEMPERATURE: 97 F

## 2024-08-26 DIAGNOSIS — Z12.11 COLON CANCER SCREENING: Primary | ICD-10-CM

## 2024-08-26 DIAGNOSIS — Z87.891 HISTORY OF TOBACCO ABUSE: ICD-10-CM

## 2024-08-26 DIAGNOSIS — Z00.00 ROUTINE HEALTH MAINTENANCE: ICD-10-CM

## 2024-08-26 RX ORDER — TRIAMTERENE AND HYDROCHLOROTHIAZIDE 75; 50 MG/1; MG/1
0.5 TABLET ORAL DAILY
Qty: 135 TABLET | Refills: 2 | Status: SHIPPED | OUTPATIENT
Start: 2024-08-26

## 2024-08-26 NOTE — TELEPHONE ENCOUNTER
Patient calling to request refill of TRIAMTERENE-HYDROCHLOROTHIAZIDE 75-50 MG Oral Tab   Pharmacy [place] [id number] [phone], [phone] [[id number]]

## 2024-08-26 NOTE — PROGRESS NOTES
HPI:   Shruthi Arroyo is a 59 year old female who presents for a complete physical exam. Symptoms: denies discharge, itching, burning or dysuria. Patient complains of needing colonoscopy , has an appt for .mammogram. Needs tdap. Has been exercising multiple days a week with a , has not lost a pound. Clothes are fitting the same but has actually gained a few pounds.    Immunization History   Administered Date(s) Administered    Covid-19 Vaccine Moderna 100 mcg/0.5 ml 04/08/2021, 05/13/2021, 01/14/2022    FLULAVAL 6 months & older 0.5 ml Prefilled syringe (79599) 11/19/2020, 10/23/2021, 11/07/2022    TDAP 01/01/2004, 08/29/2011, 08/26/2024    Zoster Vaccine Recombinant Adjuvanted (Shingrix) 10/23/2021, 01/15/2022     Wt Readings from Last 6 Encounters:   08/26/24 200 lb (90.7 kg)   03/26/24 196 lb (88.9 kg)   10/24/23 199 lb (90.3 kg)   10/10/23 200 lb (90.7 kg)   08/22/23 197 lb 3.2 oz (89.4 kg)   08/07/23 201 lb 2 oz (91.2 kg)     Body mass index is 30.41 kg/m².     Lab Results   Component Value Date     (H) 08/25/2023    GLU 85 08/30/2022    GLU 88 03/05/2022     Lab Results   Component Value Date    CHOLEST 176 03/05/2022    CHOLEST 179 08/27/2020    CHOLEST 192 08/24/2019     Lab Results   Component Value Date    HDL 75 (H) 03/05/2022    HDL 67 (H) 08/27/2020    HDL 57 08/24/2019     Lab Results   Component Value Date    LDL 90 03/05/2022     (H) 08/27/2020     (H) 08/24/2019     Lab Results   Component Value Date    AST 21 08/25/2023    AST 25 08/30/2022    AST 24 03/05/2022     Lab Results   Component Value Date    ALT 23 08/25/2023    ALT 29 08/30/2022    ALT 24 03/05/2022       Current Outpatient Medications   Medication Sig Dispense Refill    thyroid (NP THYROID) 60 MG Oral Tab Take 1 tablet (60 mg total) by mouth daily. 30 tablet 2    TRIAMTERENE-HYDROCHLOROTHIAZIDE 75-50 MG Oral Tab Take 1/2 (one-half) tablet by mouth once daily 45 tablet 0      Past Medical  History:    Allergic rhinitis, cause unspecified    Allergic rhinitis, cause unspecified    Anxiety    Arthritis    Mild    Bloating    Last few months - occasional    Blurred vision    Last few months.    Body piercing    Ears    Breast cancer (HCC)    Carcinoma in situ of breast    Carpal tunnel syndrome    Chronic rhinitis    Decorative tattoo    Diarrhea, unspecified    Occasional last few months    Essential hypertension, benign    Fatigue    Years. Can fall asleep any time I sit long periods    Flatulence/gas pain/belching    Last 6 months    Frequent UTI    Heart palpitations    Recently gotten worse but has happened for many years    Heavy menses    Until 2011. Had ablasion.    History of cardiac murmur    Most of my life - mild    Hoarseness, chronic    Sometimes its hard to speak. Comes w diff swallowing    Pain in joints    Problems with swallowing    Occ difficulty swallow food. Voice hoarse same time    Stress    Illness and new job    Thyroid disorder    pt. has cysts on thyroids    Tobacco abuse    Unspecified essential hypertension    Wears glasses      Past Surgical History:   Procedure Laterality Date    Biopsy of breast, incisional      Breast surgery      Cholecystectomy      Colonoscopy N/A 1/31/2017    Procedure: COLONOSCOPY, POSSIBLE BIOPSY, POSSIBLE POLYPECTOMY 97146;  Surgeon: Luc Calabrese MD;  Location: [facility]    Colonoscopy & polypectomy  1/17- repeat 1/18    mulltiple polyps, tics (adenomas, TVA)    Gastro - dmg  1/17    esophageal nodule (papilloma)    Lumpectomy left  01/03/2011    DCIS    Kwame localization wire 1 site right (cpt=19281) Right     BENIGN   Late 90's     Radiation left Left     2011    Removal gallbladder        Family History   Problem Relation Age of Onset    Diabetes Father     Cancer Mother 70        breast     Breast Cancer Mother 70    Hypertension Mother     Cancer Paternal Uncle         stomach CA      Social History:   Social History      Socioeconomic History    Marital status:    Tobacco Use    Smoking status: Former     Current packs/day: 0.00     Average packs/day: 1 pack/day for 30.0 years (30.0 ttl pk-yrs)     Types: Cigarettes     Start date: 10/1/1984     Quit date: 10/1/2014     Years since quittin.9    Smokeless tobacco: Never   Vaping Use    Vaping status: Never Used   Substance and Sexual Activity    Alcohol use: Yes     Alcohol/week: 3.0 standard drinks of alcohol     Types: 3 Standard drinks or equivalent per week     Comment: minimal    Drug use: No    Sexual activity: Yes     Partners: Male     Comment: No HIV high risk behavior   Other Topics Concern    Caffeine Concern Yes     Comment: 2 cups a day    Exercise Yes     Comment: 5x/week    Seat Belt Yes     Social Determinants of Health      [facility]    Social Connections    [facility]    Housing Stability     Occ:  at [employer]. : . Children:  .   Exercise:  several  times per week.  Diet: watches calories closely     REVIEW OF SYSTEMS:   GENERAL: feels well otherwise  SKIN: denies any unusual skin lesions  EYES:denies blurred vision or double vision  HEENT: denies nasal congestion, sinus pain or ST  LUNGS: denies shortness of breath with exertion  CARDIOVASCULAR: denies chest pain on exertion  GI: denies abdominal pain,denies heartburn  : denies dysuria, vaginal discharge or itching,periods regular   MUSCULOSKELETAL: denies back pain  NEURO: denies headaches  PSYCHE: denies depression or anxiety  HEMATOLOGIC: denies hx of anemia  ENDOCRINE: denies thyroid history  ALL/ASTHMA: denies hx of allergy or asthma    EXAM:   /82   Pulse 118   Temp 97.1 °F (36.2 °C) (Temporal)   Resp 16   Ht 5' 8\" (1.727 m)   Wt 200 lb (90.7 kg)   SpO2 97%   BMI 30.41 kg/m²   Body mass index is 30.41 kg/m².    GENERAL: well developed, well nourished,in no apparent distress  SKIN: no rashes,no suspicious lesions  HEENT: atraumatic, normocephalic,ears and throat are clear  EYES:PERRLA, EOMI, normal optic disk,conjunctiva are clear  NECK: supple,no adenopathy,no bruits  CHEST: no chest tenderness  LUNGS: clear to auscultation  CARDIO: RRR without murmur  GI: good BS's,no masses, HSM or tenderness  MUSCULOSKELETAL: back is not tender,FROM of the back  EXTREMITIES: no cyanosis, clubbing or edema  NEURO: Oriented times three,cranial nerves are intact,motor and sensory are grossly intact    ASSESSMENT AND PLAN:   Shruthi Arroyo is a 59 year old female who presents for a complete physical exam. NO Pap and pelvic done. Order put in for mammogram . Self breast exam explained. Health maintenance, will check fasting Lipids, CMP, and CBC. Pt referred for screening colonoscopy. Pt info handouts given for: exercise, low fat diet and breast self-exam. Pt' s weight is Body mass index is 30.41 kg/m²., recommended low fat diet and aerobic exercise 30 minutes three times weekly.  The patient indicates understanding of these issues and agrees to the plan.  The patient is asked to return for CPX in 1 year.  Encounter Diagnoses   Name Primary?    Routine health maintenance     Colon cancer screening Yes    History of tobacco abuse        Orders Placed This Encounter   Procedures    Comp Metabolic Panel (14) [E]    CBC W Differential W Platelet [E]    Lipid Panel [E]    TdaP (Adacel, Boostrix) [80742]       Meds & Refills for this Visit:  Requested Prescriptions      No prescriptions requested or ordered in this encounter       Imaging & Consults:  GASTRO - INTERNAL  TETANUS, DIPHTHERIA TOXOIDS AND ACELLULAR PERTUSIS VACCINE (TDAP), >7 YEARS, IM USE  CT LUNG LD SCREENING(CPT=71271)  CONTINUE CURRENT DIET AND MAYBE ADD SOME CALORIES TO GET TO 2000, HAS ENOUGH WATER AND PROTEIN IN HER DIET, ADD SOME CARDIO, FOR 30 MIN, TREADMILL, OR  ELLIPTICAL

## 2024-09-17 ENCOUNTER — PATIENT MESSAGE (OUTPATIENT)
Dept: INTEGRATIVE MEDICINE | Facility: CLINIC | Age: 59
End: 2024-09-17

## 2024-09-17 ENCOUNTER — TELEMEDICINE (OUTPATIENT)
Dept: INTEGRATIVE MEDICINE | Facility: CLINIC | Age: 59
End: 2024-09-17
Payer: COMMERCIAL

## 2024-09-17 DIAGNOSIS — E61.7 DEFICIENCY OF MULTIPLE NUTRIENT ELEMENTS: ICD-10-CM

## 2024-09-17 DIAGNOSIS — R53.82 CHRONIC FATIGUE: ICD-10-CM

## 2024-09-17 DIAGNOSIS — R79.89 LOW VITAMIN D LEVEL: ICD-10-CM

## 2024-09-17 DIAGNOSIS — E34.8 ENDOCRINE AXIS DYSFUNCTION: ICD-10-CM

## 2024-09-17 DIAGNOSIS — E06.3 HYPOTHYROIDISM DUE TO HASHIMOTO'S THYROIDITIS: Primary | ICD-10-CM

## 2024-09-17 DIAGNOSIS — R23.2 HOT FLASHES: ICD-10-CM

## 2024-09-17 PROCEDURE — 99214 OFFICE O/P EST MOD 30 MIN: CPT | Performed by: PHYSICIAN ASSISTANT

## 2024-09-17 NOTE — PATIENT INSTRUCTIONS
[employer] - Multivitamin with plant based antioxidant support   Dose: 2  tabs daily with food, Avoid taking on empty stomach.     2. You will receive an email from [employer] to purchase one of the IgG food testing kits.  Please call [employer] and discussed which 1 would be best for you before proceeding.    3. Low dose naltrexone - LDN is a safe, non-toxic and inexpensive drug that helps regulate a dysfunctional immune system.    Resources to learn more:  https://ldnresearchtrust.org/what-is-low-dose-naltrexone-l  Http://www.lowdosenaltrexone.org/    \"The LDN Book\" by Bonita Coppola     4.  Get labs drawn in 1 to 2 months before next follow-up.

## 2024-09-17 NOTE — PROGRESS NOTES
Shruthi Arroyo is a 59 year old female.  Chief Complaint   Patient presents with    Follow - Up       HPI:   59-year-old postmenopausal female patient with known history of breast carcinoma in situ with tamoxifen presents today for follow up.    Labs: 8/10/24 - to review today    Feels stronger with adding one day of weight training    Still feels tired   Not dropping any weight    Did not get the [employer] FIT/Infinite test yet due to finances and not getting email from [employer].    Has been on the increased dose of NP 60mg since June, but unsure if it helped since stress drastically increased at the same time.  No longer with cold intolerance, but now feeling less tolerant of heat, some hot flashes over last 2-3weeks   Still with hoarse voice when stressed  Denies heart palpitations, anxiety.     Sleeping more heavy and getting 7hours  Has been on dhea and Pregnenolone.        ---------------------------------Last OV 3/26/24------------------------------    Fatigue   Better sleep - taking Mg glycinate, Pregnenolone 20mg. Has not taken the STEPHANIE yet.  Weight training 3d/wk, treadmill 2d/wk - feeling stronger but not noticing losing weight  More stress in last 2mos    Thyroid antibodies/thyromegaly/nodules -  Still fatigued, dry skin, dry mouth (which phentermine was causing), hoarse voice. Denies changes in mood, constipation, hair.   Ultrasound 8/27/23 -4 nodules, 1 new, stable.     Low DHEA-S and Pregnenolone.  Exercise recovery better. Still no libido. Is gaining more muscle with  now, but still with stagnant weight.  Fatigued by end of afternoon/early evening.     Still with sugar cravings - tries to have fruit after meals in afternoon.    Weight Gain - Hyperinsulinemia  -strong family h/o morbid obesity and has struggled with weight issues most of her life.   -100lb weight loss with [employer] at about 37yo and was able to keep it off for awhile.   -Diagnosed with breast carcinoma in situ - had  radiation and put on tamoxifen x4yrs.    -Ablation at 45yo due to menorrhagia. Quit smoking about that time. Then weight gain started despite exercise and diet changes.   -Discontinued the Phentermine, Ozempic (only on for few months but sourcing was an issue and never made it to the therapeutic) with Dr. Shannon. Ozempic did help with inflammation and she felt better, but no weight loss.      GI - regular daily BM, cholecystectomy in 1990s. Occasional bloating, particularly with fruit, so cut out most fruit.     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   H/o HTN now just on diuretic due to LE edema.   Childhood - frequent strep and sinus infections     REVIEW OF SYSTEMS:   Review of Systems   Constitutional:  Positive for fatigue. Negative for chills and fever. Unexpected weight change: weight gain.  HENT:  Positive for voice change (hoarse voice when stressed). Negative for sore throat.    Eyes: Negative.  Negative for visual disturbance.   Respiratory: Negative.  Negative for cough, shortness of breath and wheezing.    Cardiovascular: Negative.  Negative for chest pain and palpitations.   Gastrointestinal:  Negative for abdominal pain, constipation, diarrhea, nausea and vomiting.   Endocrine: Positive for heat intolerance. Negative for cold intolerance.   Genitourinary: Negative.    Musculoskeletal: Negative.    Skin: Negative.    Allergic/Immunologic: Negative.    Neurological:  Positive for tremors. Negative for weakness and headaches.   Hematological: Negative.    Psychiatric/Behavioral:  Negative for sleep disturbance.         Labile mood            FAMILY HISTORY:      Family History   Problem Relation Age of Onset    Diabetes Father     Cancer Mother 70        breast     Breast Cancer Mother 70    Hypertension Mother     Cancer Paternal Uncle         stomach CA   Mother, overactive thyroid   All siblings - obesity    MEDICAL HISTORY:     Past Medical History:    Allergic rhinitis, cause  unspecified    Allergic rhinitis, cause unspecified    Anxiety    Arthritis    Mild    Bloating    Last few months - occasional    Blurred vision    Last few months.    Body piercing    Ears    Breast cancer (HCC)    Carcinoma in situ of breast    Carpal tunnel syndrome    Chronic rhinitis    Decorative tattoo    Diarrhea, unspecified    Occasional last few months    Essential hypertension, benign    Fatigue    Years. Can fall asleep any time I sit long periods    Flatulence/gas pain/belching    Last 6 months    Frequent UTI    Heart palpitations    Recently gotten worse but has happened for many years    Heavy menses    Until 2011. Had ablasion.    History of cardiac murmur    Most of my life - mild    Hoarseness, chronic    Sometimes its hard to speak. Comes w diff swallowing    Pain in joints    Problems with swallowing    Occ difficulty swallow food. Voice hoarse same time    Stress    Illness and new job    Thyroid disorder    pt. has cysts on thyroids    Tobacco abuse    Unspecified essential hypertension    Wears glasses       CURRENT MEDICATIONS:     Current Outpatient Medications   Medication Sig Dispense Refill    triamterene-hydroCHLOROthiazide 75-50 MG Oral Tab Take 0.5 tablets by mouth daily. 135 tablet 2    thyroid (NP THYROID) 60 MG Oral Tab Take 1 tablet (60 mg total) by mouth daily. 30 tablet 2       SOCIAL HISTORY:   Lifestyle Factors affecting health:  Diet - Avoids carbs and gluten. Eating a lot more protein. Egg whites and cottage cheese for breakfast, protein bar or drink for lunch. Chicken and mixed veggies, fruit for dinner. Limits veggies and carbs.    Exercise - regular, meets with , walks dogs  Stress - improved over last 6mos, but fluctuates  Sleep - Better. Had Not been good for years. Up at 4am for work, to bed by 9:30pm.     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 [employer]    Social History     Socioeconomic History    Marital status:    Tobacco Use    Smoking status: Former     Current  packs/day: 0.00     Average packs/day: 1 pack/day for 30.0 years (30.0 ttl pk-yrs)     Types: Cigarettes     Start date: 10/1/1984     Quit date: 10/1/2014     Years since quittin.9    Smokeless tobacco: Never   Vaping Use    Vaping status: Never Used   Substance and Sexual Activity    Alcohol use: Yes     Alcohol/week: 3.0 standard drinks of alcohol     Types: 3 Standard drinks or equivalent per week     Comment: minimal    Drug use: No    Sexual activity: Yes     Partners: Male     Comment: No HIV high risk behavior   Other Topics Concern    Caffeine Concern Yes     Comment: 2 cups a day    Exercise Yes     Comment: 5x/week    Seat Belt Yes     Social Determinants of Health      [facility]    Social Connections    [facility] from [facility]    Housing Stability       SURGICAL HISTORY:     Past Surgical History:   Procedure Laterality Date    Biopsy of breast, incisional      Breast surgery      Cholecystectomy      Colonoscopy N/A 2017    Procedure: COLONOSCOPY, POSSIBLE BIOPSY, POSSIBLE POLYPECTOMY 73725;  Surgeon: Luc Calabrese MD;  Location: [facility]    Colonoscopy & polypectomy  - repeat     mulltiple polyps, tics (adenomas, TVA)    Gastro - dmg      esophageal nodule (papilloma)    Lumpectomy left  2011    DCIS    Kwame localization wire 1 site right (cpt=19281) Right     BENIGN   Late 90's     Radiation left Left         Removal gallbladder         PHYSICAL EXAM:     There were no vitals filed for this visit.          Physical Exam  Constitutional:       General: She is not in acute distress.     Appearance: Normal appearance. She is not ill-appearing or toxic-appearing.   HENT:      Head: Normocephalic and atraumatic.   Eyes:      Extraocular Movements: Extraocular movements intact.   Pulmonary:      Effort: Pulmonary effort is normal. No respiratory  distress.   Musculoskeletal:      Cervical back: Normal range of motion.   Skin:     Coloration: Skin is not jaundiced.      Findings: No lesion.   Neurological:      Mental Status: She is alert and oriented to person, place, and time.   Psychiatric:         Mood and Affect: Mood normal.         Behavior: Behavior normal.         Thought Content: Thought content normal.         Judgment: Judgment normal.          ASSESSMENT AND PLAN:     [facility] Lab Encounter on 08/10/2024   Component Date Value Ref Range Status    TSH 08/10/2024 3.560  0.550 - 4.780 mIU/mL Final    Free T4 08/10/2024 1.1  0.8 - 1.7 ng/dL Final    Reverse T3 08/10/2024 17.7  9.2 - 24.1 ng/dL Final    T3 Free 08/10/2024 3.22  2.40 - 4.20 pg/mL Final    Anti-Thyroglobulin 08/10/2024 149 (H)  <60 U/mL Final    Anti-Thyroperoxidase 08/10/2024 719 (H)  <60 U/mL Final   [facility] Lab Encounter on 07/20/2024   Component Date Value Ref Range Status    Allergen [place] 07/20/2024 <0.10  <0.10 kUA/L Final    Allergen Brazil Nut 07/20/2024 <0.10  <0.10 kUA/L Final    Allergen Cashew 07/20/2024 <0.10  <0.10 kUA/L Final    Allergen Clam 07/20/2024 <0.10  <0.10 kUA/L Final    Allergen Corn 07/20/2024 <0.10  <0.10 kUA/L Final    Allergen Egg White 07/20/2024 <0.10  <0.10 kUA/L Final    Allergen Fish 07/20/2024 <0.10  <0.10 kUA/L Final    Allergen Hazelnut 07/20/2024 <0.10  <0.10 kUA/L Final    Allergen Milk 07/20/2024 <0.10  <0.10 kUA/L Final    Allergen Peanut 07/20/2024 <0.10  <0.10 kUA/L Final    Allergen [place] 07/20/2024 <0.10  <0.10 kUA/L Final    Allergen Scallop 07/20/2024 <0.10  <0.10 kUA/L Final    Allergen Sesame Seed 07/20/2024 <0.10  <0.10 kUA/L Final    Allergen Shrimp 07/20/2024 <0.10  <0.10 kUA/L Final    Allergen Soybean 07/20/2024 <0.10  <0.10 kUA/L Final    Allergen [place] 07/20/2024 <0.10  <0.10 kUA/L Final    Allergen Wheat 07/20/2024 <0.10  <0.10 kUA/L Final    Immunoglobulin E 07/20/2024 75.50  2.00 - 214.00 kU/L Final   [facility] Lab Encounter on 05/18/2024    Component Date Value Ref Range Status    Cortisol 05/18/2024 9.6  ug/dL Final    7AM - 9AM Serum: 5.27-22.45 µg/dL  3PM - 5PM Serum: 3.44-16.76 µg/dL    Serum cortisol levels drawn outside these time frames do not have an associated reference range.              Pregnenolone 05/18/2024 40  ng/dL Final    This test was developed and its performance characteristics  determined by [facility]. It has not been cleared or approved  by the Food and Drug Administration.  Reference Range:  Adults: <151    DHEA Sulfate 05/18/2024 96.8  25.9 - 460.2 ug/dL Final    This test may exhibit interference of greater than 10% when a sample is collected from a person who is consuming high dose of biotin (a.k.a., vitamin B7, vitamin H, coenzyme R) supplements resulting in serum concentrations &gt;12.5 ng/mL, leading to either falsely elevated or falsely depressed results.  Intake of the recommended daily allowance (RDA) for biotin (0.03 mg) has not been shown to typically cause significant interference; however, high dose daily dietary supplements may contain biotin concentrations greater than 150 times (5-10 mg) the RDA.  It is recommended that physicians ask all patients who may be on biotin supplementation to stop biotin consumption at least 72 hours prior to collection of a new sample.    Estrone 05/18/2024 39  pg/mL Final                                                  Range                      Adult (Premenopausal)    27 - 231                Menstrual Cycle (1-10 days)    19 - 149                Menstrual Cycle (11-20 days)   32 - 176                Menstrual Cycle (21-30 days)   37 - 200                      Adult (Postmenopausal)    0 - 125    Estradiol 05/18/2024 14.3  pg/mL Final                         Adult Female             Range                        Follicular phase     12.5 - 166.0                        Ovulation phase      85.8 - 498.0                        Luteal phase         43.8 - 211.0                         Postmenopausal       <6.0 -  54.7                       Pregnancy                        1st trimester     215.0 - >4300.0  Roche ECLIA methodology    Progesterone 05/18/2024 0.66  No established range for female sex ng/mL Final    Normal Females   Follicular       0.210 - 1.70   ng/mL   Luteal           2.25 - 24.2    ng/mL   Mid-Luteal       8.76 - 21.6    ng/mL   Post Menopausal  <0.200 - 0.901 ng/mL    Pregnant Females   First Trimester  11.4 - 41.0   ng/mL   Second Trimester 13.8 - 156.0  ng/mL   Third Trimester  51.4 - >200.0 ng/mL  This test may exhibit interference when a sample is collected from a person who is consuming high dose of biotin (a.k.a., vitamin B7, vitamin H, coenzyme R) supplements resulting in serum concentrations >100 ng/mL.  Intake of the recommended daily allowance (RDA) for biotin (0.03 mg) has not been shown to typically cause significant interference; however, high dose daily dietary supplements may contain biotin concentrations greater than 150 times (5-10 mg) the RDA.  It is recommended that physicians ask all patients who may be on biotin supplementation to stop biotin consumption at least 72 hours prior to collection of a new sample.      Testosterone Tot LC/MS 05/18/2024 38.1  ng/dL Final                             Female:                            Premenopausal    10.0 - 55.0                            Postmenopausal    7.0 - 40.0    Testost % Free+Weak Bnd 05/18/2024 6.5  3.0 - 18.0 % Final    This test was developed and its performance characteristics  determined by [facility]. It has not been cleared or approved  by the Food and Drug Administration.    Testost Free+Weak Bnd 05/18/2024 2.5  0.0 - 9.5 ng/dL Final    Sex Horm Bind Glob 05/18/2024 65.5  17.3 - 125.0 nmol/L Final    HgbA1C 05/18/2024 5.4  <5.7 % Final     Normal HbA1C:     <5.7%      Pre-Diabetic:     5.7 - 6.4%      Diabetic:         >6.4%      Diabetic Control: <7.0%        Estimated Average Glucose 05/18/2024 108   68 - 126 mg/dL Final    eAG is the estimated average glucose calculated from Hgb A1c according to the formula recommended by the American Diabetes Association. eAG levels reflect the long term average glucose and may not correlate with random or fasting glucose levels since these represent specific points in time.           Insulin 05/18/2024 9.6  3.0 - 25.0 mU/L Final    Reverse T3 05/18/2024 16.2  9.2 - 24.1 ng/dL Final    T3 Free 05/18/2024 2.35 (L)  2.40 - 4.20 pg/mL Final    This test may exhibit interference when a sample is collected from a person who is consuming high dose of biotin (a.k.a., vitamin B7, vitamin H, coenzyme R) supplements resulting in serum concentrations >100 ng/mL.  Intake of the recommended daily allowance (RDA) for biotin (0.03 mg) has not been shown to typically cause significant interference; however, high dose daily dietary supplements may contain biotin concentrations greater than 150 times (5-10 mg) the RDA.  It is recommended that physicians ask all patients who may be on biotin supplementation to stop biotin consumption at least 72 hours prior to collection of a new sample.      TSH 05/18/2024 4.210 (H)  0.358 - 3.740 mIU/mL Final    This test may exhibit interference when a sample is collected from a person who is consuming high dose of biotin (a.k.a., vitamin B7, vitamin H, coenzyme R) supplements resulting in serum concentrations >100 ng/mL.  Intake of the recommended daily allowance (RDA) for biotin (0.03 mg) has not been shown to typically cause significant interference; however, high dose daily dietary supplements may contain biotin concentrations greater than 150 times (5-10 mg) the RDA.  It is recommended that physicians ask all patients who may be on biotin supplementation to stop biotin consumption at least 72 hours prior to collection of a new sample.      Free T4 05/18/2024 0.8  0.8 - 1.7 ng/dL Final    If applicable: Pregnancy Reference Intervals  First trimester 10-13  weeks gestation    0.9-1.4 ng/dL  Second trimester 14-26 weeks gestation   0.7-1.3 ng/dL      This test may exhibit interference when a sample is collected from a person who is consuming high dose of biotin (a.k.a., vitamin B7, vitamin H, coenzyme R) supplements resulting in serum concentrations >100 ng/mL.  Intake of the recommended daily allowance (RDA) for biotin (0.03 mg) has not been shown to typically cause significant interference; however, high dose daily dietary supplements may contain biotin concentrations greater than 150 times (5-10 mg) the RDA.  It is recommended that physicians ask all patients who may be on biotin supplementation to stop biotin consumption at least 72 hours prior to collection of a new sample.        No results found.    1. Hypothyroidism due to Hashimoto's thyroiditis  - Comp Metabolic Panel (14); Future  - Assay, Thyroid Stim Hormone; Future  - Free T4, (Free Thyroxine); Future  - Free T3 (Triiodothryronine); Future  - Reverse T3, Serum; Future    2. Low vitamin D level  - Vitamin D; Future    3. Chronic fatigue    4. Hot flashes    5. Endocrine axis dysfunction  - Dehydroepiandrosterone Sulfate; Future  - Pregnenolone by MS/MS, Serum; Future    6. Deficiency of multiple nutrient elements  - Comp Metabolic Panel (14); Future  - Vitamin B12; Future      This visit was conducted using Telemedicine with live, interactive video and audio.   The patient understands the risks and benefits of Telemedicine and that a Telemedicine visit limits the ability to perform a thorough physical examination which may affect objective findings related to specific symptoms and conditions which can, in turn, affect treatment.     The patient was located in the [facility] at the time of the encounter.     Time spent with patient: Over 33 minutes spent in chart review and in direct communication with patient obtaining and reviewing history, creating a unique care plan, explaining the rationale for  treatment, reviewing potential SE and overall treatment plan,  documenting all clinical information in Epic. Over 50% of this time was in education, counseling and coordination of care.     Problem List Items Addressed This Visit          Symptoms and Signs    Hot flashes     Other Visit Diagnoses       Hypothyroidism due to Hashimoto's thyroiditis    -  Primary    Relevant Orders    Comp Metabolic Panel (14)    Assay, Thyroid Stim Hormone    Free T4, (Free Thyroxine)    Free T3 (Triiodothryronine)    Reverse T3, Serum    Low vitamin D level        Relevant Orders    Vitamin D    Chronic fatigue        Endocrine axis dysfunction        Relevant Orders    Dehydroepiandrosterone Sulfate    Pregnenolone by MS/MS, Serum    Deficiency of multiple nutrient elements        Relevant Orders    Comp Metabolic Panel (14)    Vitamin B12              Endocrine dysfunction:  Hashimoto's hypothyroid-thyroid hormones have improved on NP thyroid 60 mg.  Thyroid antibodies remain elevated.  -> Consider LDN therapy, research info placed below    Low adrenal hormones-supplementing with DHEA-S and pregnenolone   -> Does report significantly increased stress over the last 2 months  -> Recheck adrenal hormones  -> Suspect low nutrient intake with restricted diet.  Discussed adding in more complex carbohydrates and healthy fats to support energy levels, hormones, and daily workouts.  Handouts from detox and renew plan shared for meal ideas.    Insomnia- improved, may start STEPHANIE as needed.    ? Menopausal -starting to have heat intolerance.  Ablation at 46 years old and history of breast carcinoma in situ.    Resistant weight loss due to all of the above as well as ongoing inflammation (stress and possible food sensitivities suspected).   -> Discussed IgG food testing again, patient is open to taking and we will resend email    Fatigue persists  -> Check vitamin D and vitamin B12 levels  -> Start back on PhytoMulti    Follow-up in 2-3  months.  Follow-up lab testing in 4 to 6 weeks    Orders Placed This Visit:  Orders Placed This Encounter   Procedures    Comp Metabolic Panel (14)    Dehydroepiandrosterone Sulfate    Pregnenolone by MS/MS, Serum    Vitamin D    Vitamin B12    Assay, Thyroid Stim Hormone    Free T4, (Free Thyroxine)    Free T3 (Triiodothryronine)    Reverse T3, Serum     No orders of the defined types were placed in this encounter.      Patient Instructions       [employer] - Multivitamin with plant based antioxidant support   Dose: 2  tabs daily with food, Avoid taking on empty stomach.     2. You will receive an email from [employer] to purchase one of the IgG food testing kits.  Please call [employer] and discussed which 1 would be best for you before proceeding.    3. Low dose naltrexone - LDN is a safe, non-toxic and inexpensive drug that helps regulate a dysfunctional immune system.    Resources to learn more:  https://ldnresearchtrust.org/what-is-low-dose-naltrexone-l  Http://www.lowdosenaltrexone.org/    \"The LDN Book\" by Bonita Coppola     4.  Get labs drawn in 1 to 2 months before next follow-up.      Return in about 2 months (around 11/17/2024) for x60min: review IgG testing, thyroid, adrenal hormones, weight.    Patient affirmed understanding of plan and all questions were answered.     Daniela Forrest PA-C

## 2024-10-08 RX ORDER — THYROID 60 MG/1
60 TABLET ORAL DAILY
Qty: 30 TABLET | Refills: 2 | Status: SHIPPED | OUTPATIENT
Start: 2024-10-08

## 2024-10-08 NOTE — TELEPHONE ENCOUNTER
A refill request was received for:  Requested Prescriptions     Pending Prescriptions Disp Refills    thyroid (NP THYROID) 60 MG Oral Tab 30 tablet 2     Sig: Take 1 tablet (60 mg total) by mouth daily.     Last refill date:  06/08/2024  Qty: 30/2 refills   Dx:   Multiple thyroid nodules   Last office visit: 09/17/2024   When is follow up due: Schedule          Future Appointments   Date Time Provider Department Center   12/17/2024  4:30 PM Daniela Forrest PA-C [facility] [place]

## 2024-12-17 ENCOUNTER — OFFICE VISIT (OUTPATIENT)
Dept: INTEGRATIVE MEDICINE | Facility: CLINIC | Age: 59
End: 2024-12-17
Payer: COMMERCIAL

## 2024-12-17 VITALS
OXYGEN SATURATION: 97 % | HEIGHT: 68 IN | BODY MASS INDEX: 31.49 KG/M2 | SYSTOLIC BLOOD PRESSURE: 122 MMHG | DIASTOLIC BLOOD PRESSURE: 72 MMHG | WEIGHT: 207.81 LBS | HEART RATE: 94 BPM

## 2024-12-17 DIAGNOSIS — E06.3 HYPOTHYROIDISM DUE TO HASHIMOTO'S THYROIDITIS: Primary | ICD-10-CM

## 2024-12-17 DIAGNOSIS — E34.8 ENDOCRINE AXIS DYSFUNCTION: ICD-10-CM

## 2024-12-17 DIAGNOSIS — G47.00 INSOMNIA, UNSPECIFIED TYPE: ICD-10-CM

## 2024-12-17 DIAGNOSIS — R76.8 THYROID ANTIBODY POSITIVE: ICD-10-CM

## 2024-12-17 DIAGNOSIS — E88.819 INSULIN RESISTANCE: ICD-10-CM

## 2024-12-17 DIAGNOSIS — F43.9 STRESS: ICD-10-CM

## 2024-12-17 DIAGNOSIS — R53.82 CHRONIC FATIGUE: ICD-10-CM

## 2024-12-17 DIAGNOSIS — R79.89 LOW VITAMIN D LEVEL: ICD-10-CM

## 2024-12-17 PROCEDURE — 3078F DIAST BP <80 MM HG: CPT | Performed by: PHYSICIAN ASSISTANT

## 2024-12-17 PROCEDURE — G2211 COMPLEX E/M VISIT ADD ON: HCPCS | Performed by: PHYSICIAN ASSISTANT

## 2024-12-17 PROCEDURE — 3074F SYST BP LT 130 MM HG: CPT | Performed by: PHYSICIAN ASSISTANT

## 2024-12-17 PROCEDURE — 3008F BODY MASS INDEX DOCD: CPT | Performed by: PHYSICIAN ASSISTANT

## 2024-12-17 PROCEDURE — 99215 OFFICE O/P EST HI 40 MIN: CPT | Performed by: PHYSICIAN ASSISTANT

## 2024-12-17 NOTE — PROGRESS NOTES
Shruthi Arroyo is a 59 year old female.  Chief Complaint   Patient presents with    Follow - Up       HPI:   59-year-old postmenopausal female patient with known history of breast carcinoma in situ with tamoxifen presents today for follow up.    A lot of stress at work    Started the relora more regularly more recently.     Resistant weight loss   IgG food testing     Hashimoto's hypothyroid-thyroid hormones have improved on NP thyroid 60 mg.  Thyroid antibodies remain elevated.  Considered LDN therapy    Low adrenal hormones-supplementing with DHEA-S and pregnenolone   -> Does report significantly increased stress over the last 2 months  -> Recheck adrenal hormones  -> Suspect low nutrient intake with restricted diet.  Started to add in more complex carbohydrates and healthy fats to support energy levels, hormones, and daily workouts.  Handouts from detox and renew plan shared for meal ideas.     Insomnia- improved, has been able to sleep 7hours. Is on the pregnenolone and mg which helps.   Did start the STEPHANIE as needed.     ?Menopausal -starting to have heat intolerance.  Ablation at 46 years old and history of breast carcinoma in situ.    Fatigue persists  Did not start the PhytoMulti  --------------------------------------------Last OV---------------------------------------------------    Labs: 8/10/24 - to review today    Feels stronger with adding one day of weight training    Still feels tired   Not dropping any weight    Did not get the [employer] FIT/Infinite test yet due to finances and not getting email from [employer].    Has been on the increased dose of NP 60mg since June, but unsure if it helped since stress drastically increased at the same time.  No longer with cold intolerance, but now feeling less tolerant of heat, some hot flashes over last 2-3weeks   Still with hoarse voice when stressed  Denies heart palpitations, anxiety.     Sleeping more heavy and getting 7hours  Has been on dhea and  Pregnenolone.        ---------------------------------Last OV 3/26/24------------------------------    Fatigue   Better sleep - taking Mg glycinate, Pregnenolone 20mg. Has not taken the STEPHANIE yet.  Weight training 3d/wk, treadmill 2d/wk - feeling stronger but not noticing losing weight  More stress in last 2mos    Thyroid antibodies/thyromegaly/nodules -  Still fatigued, dry skin, dry mouth (which phentermine was causing), hoarse voice. Denies changes in mood, constipation, hair.   Ultrasound 8/27/23 -4 nodules, 1 new, stable.     Low DHEA-S and Pregnenolone.  Exercise recovery better. Still no libido. Is gaining more muscle with  now, but still with stagnant weight.  Fatigued by end of afternoon/early evening.     Still with sugar cravings - tries to have fruit after meals in afternoon.    Weight Gain - Hyperinsulinemia  -strong family h/o morbid obesity and has struggled with weight issues most of her life.   -100lb weight loss with [employer] at about 37yo and was able to keep it off for awhile.   -Diagnosed with breast carcinoma in situ - had radiation and put on tamoxifen x4yrs.    -Ablation at 47yo due to menorrhagia. Quit smoking about that time. Then weight gain started despite exercise and diet changes.   -Discontinued the Phentermine, Ozempic (only on for few months but sourcing was an issue and never made it to the therapeutic) with Dr. Shannon. Ozempic did help with inflammation and she felt better, but no weight loss.      GI - regular daily BM, cholecystectomy in 1990s. Occasional bloating, particularly with fruit, so cut out most fruit.     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   H/o HTN now just on diuretic due to LE edema.   Childhood - frequent strep and sinus infections     REVIEW OF SYSTEMS:   Review of Systems   Constitutional:  Positive for fatigue. Negative for chills and fever. Unexpected weight change: weight gain.  HENT:  Positive for voice change (hoarse  voice when stressed). Negative for sore throat.    Eyes: Negative.  Negative for visual disturbance.   Respiratory: Negative.  Negative for cough, shortness of breath and wheezing.    Cardiovascular: Negative.  Negative for chest pain and palpitations.   Gastrointestinal:  Negative for abdominal pain, constipation, diarrhea, nausea and vomiting.   Endocrine: Positive for heat intolerance. Negative for cold intolerance.   Genitourinary: Negative.    Musculoskeletal: Negative.    Skin: Negative.    Allergic/Immunologic: Negative.    Neurological:  Positive for tremors. Negative for weakness and headaches.   Hematological: Negative.    Psychiatric/Behavioral:  Negative for sleep disturbance.         Labile mood            FAMILY HISTORY:      Family History   Problem Relation Age of Onset    Diabetes Father     Cancer Mother 70        breast     Breast Cancer Mother 70    Hypertension Mother     Cancer Paternal Uncle         stomach CA   Mother, overactive thyroid   All siblings - obesity    MEDICAL HISTORY:     Past Medical History:    Allergic rhinitis, cause unspecified    Allergic rhinitis, cause unspecified    Anxiety    Arthritis    Mild    Bloating    Last few months - occasional    Blurred vision    Last few months.    Body piercing    Ears    Breast cancer (HCC)    Carcinoma in situ of breast    Carpal tunnel syndrome    Chronic rhinitis    Decorative tattoo    Diarrhea, unspecified    Occasional last few months    Essential hypertension, benign    Fatigue    Years. Can fall asleep any time I sit long periods    Flatulence/gas pain/belching    Last 6 months    Frequent UTI    Heart palpitations    Recently gotten worse but has happened for many years    Heavy menses    Until 2011. Had ablasion.    History of cardiac murmur    Most of my life - mild    Hoarseness, chronic    Sometimes its hard to speak. Comes w diff swallowing    Pain in joints    Problems with swallowing    Occ difficulty swallow food. Voice  hoarse same time    Stress    Illness and new job    Thyroid disorder    pt. has cysts on thyroids    Tobacco abuse    Unspecified essential hypertension    Wears glasses       CURRENT MEDICATIONS:     Current Outpatient Medications   Medication Sig Dispense Refill    thyroid (NP THYROID) 60 MG Oral Tab Take 1 tablet (60 mg total) by mouth daily. 30 tablet 2    triamterene-hydroCHLOROthiazide 75-50 MG Oral Tab Take 0.5 tablets by mouth daily. 135 tablet 2       SOCIAL HISTORY:   Lifestyle Factors affecting health:  Diet - Avoids carbs and gluten. Eating a lot more protein. Egg whites and cottage cheese for breakfast, protein bar or drink for lunch. Chicken and mixed veggies, fruit for dinner. Limits veggies and carbs.    Exercise - regular, meets with , walks dogs  Stress - improved over last 6mos, but fluctuates  Sleep - Better. Had Not been good for years. Up at 4am for work, to bed by 9:30pm.     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 [employer]    Social History     Socioeconomic History    Marital status:    Tobacco Use    Smoking status: Former     Current packs/day: 0.00     Average packs/day: 1 pack/day for 30.0 years (30.0 ttl pk-yrs)     Types: Cigarettes     Start date: 10/1/1984     Quit date: 10/1/2014     Years since quitting: 10.2    Smokeless tobacco: Never   Vaping Use    Vaping status: Never Used   Substance and Sexual Activity    Alcohol use: Yes     Alcohol/week: 3.0 standard drinks of alcohol     Types: 3 Standard drinks or equivalent per week     Comment: minimal    Drug use: No    Sexual activity: Yes     Partners: Male     Comment: No HIV high risk behavior   Other Topics Concern    Caffeine Concern Yes     Comment: 2 cups a day    Exercise Yes     Comment: 5x/week    Seat Belt Yes     Social Drivers of Health      Received from [facility], [facility]    Social Connections    Received from [facility], [facility]     Housing Stability       SURGICAL HISTORY:     Past Surgical History:   Procedure Laterality Date    Biopsy of breast, incisional      Breast surgery      Cholecystectomy      Colonoscopy N/A 1/31/2017    Procedure: COLONOSCOPY, POSSIBLE BIOPSY, POSSIBLE POLYPECTOMY 39494;  Surgeon: Luc Calabrese MD;  Location: [facility]    Colonoscopy & polypectomy  1/17- repeat 1/18    mulltiple polyps, tics (adenomas, TVA)    Gastro - dmg  1/17    esophageal nodule (papilloma)    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   Constitutional:       General: She is not in acute distress.     Appearance: Normal appearance.   HENT:      Mouth/Throat:      Mouth: Mucous membranes are moist.      Pharynx: Oropharynx is clear.   Eyes:      Extraocular Movements: Extraocular movements intact.      Conjunctiva/sclera: Conjunctivae normal.   Cardiovascular:      Rate and Rhythm: Normal rate and regular rhythm.      Pulses: Normal pulses.      Heart sounds: Normal heart sounds. No murmur heard.  Pulmonary:      Effort: Pulmonary effort is normal.      Breath sounds: Normal breath sounds.   Musculoskeletal:         General: No swelling or deformity.   Skin:     General: Skin is warm and dry.   Neurological:      General: No focal deficit present.      Mental Status: She is alert and oriented to person, place, and time.   Psychiatric:         Mood and Affect: Mood normal.         Behavior: Behavior normal.         Thought Content: Thought content normal.         Judgment: Judgment normal.          ASSESSMENT AND PLAN:     [facility] Lab Encounter on  08/10/2024   Component Date Value Ref Range Status    TSH 08/10/2024 3.560  0.550 - 4.780 mIU/mL Final    Free T4 08/10/2024 1.1  0.8 - 1.7 ng/dL Final    Reverse T3 08/10/2024 17.7  9.2 - 24.1 ng/dL Final    T3 Free 08/10/2024 3.22  2.40 - 4.20 pg/mL Final    Anti-Thyroglobulin 08/10/2024 149 (H)  <60 U/mL Final    Anti-Thyroperoxidase 08/10/2024 719 (H)  <60 U/mL Final   [facility] Lab Encounter on 07/20/2024   Component Date Value Ref Range Status    Allergen [place] 07/20/2024 <0.10  <0.10 kUA/L Final    Allergen Brazil Nut 07/20/2024 <0.10  <0.10 kUA/L Final    Allergen Cashew 07/20/2024 <0.10  <0.10 kUA/L Final    Allergen Clam 07/20/2024 <0.10  <0.10 kUA/L Final    Allergen Corn 07/20/2024 <0.10  <0.10 kUA/L Final    Allergen Egg White 07/20/2024 <0.10  <0.10 kUA/L Final    Allergen Fish 07/20/2024 <0.10  <0.10 kUA/L Final    Allergen Hazelnut 07/20/2024 <0.10  <0.10 kUA/L Final    Allergen Milk 07/20/2024 <0.10  <0.10 kUA/L Final    Allergen Peanut 07/20/2024 <0.10  <0.10 kUA/L Final    Allergen [place] 07/20/2024 <0.10  <0.10 kUA/L Final    Allergen Scallop 07/20/2024 <0.10  <0.10 kUA/L Final    Allergen Sesame Seed 07/20/2024 <0.10  <0.10 kUA/L Final    Allergen Shrimp 07/20/2024 <0.10  <0.10 kUA/L Final    Allergen Soybean 07/20/2024 <0.10  <0.10 kUA/L Final    Allergen [place] 07/20/2024 <0.10  <0.10 kUA/L Final    Allergen Wheat 07/20/2024 <0.10  <0.10 kUA/L Final    Immunoglobulin E 07/20/2024 75.50  2.00 - 214.00 kU/L Final      No results found.    1. Hypothyroidism due to Hashimoto's thyroiditis    2. Low vitamin D level    3. Chronic fatigue    4. Endocrine axis dysfunction    5. Thyroid antibody positive    6. Insomnia, unspecified type    7. Insulin resistance    8. Stress      Time spent with patient: Over 50 minutes spent in chart review and in direct communication with patient obtaining and reviewing history, creating a unique care plan, explaining the rationale for treatment, reviewing potential SE and  overall treatment plan,  documenting all clinical information in Epic. Over 50% of this time was in education, counseling and coordination of care.     Problem List Items Addressed This Visit    None  Visit Diagnoses       Hypothyroidism due to Hashimoto's thyroiditis    -  Primary    Low vitamin D level        Chronic fatigue        Endocrine axis dysfunction        Thyroid antibody positive        Insomnia, unspecified type        Insulin resistance        Stress                Endocrine dysfunction:  Hashimoto's hypothyroid-thyroid hormones have improved on NP thyroid 60 mg.  Thyroid antibodies remain elevated.  -> Consider LDN therapy.    Low adrenal hormones-supplementing with DHEA-S and pregnenolone   -> Does report significantly increased stress over the last 6 months  -> Recheck adrenal hormones  -> Suspect low nutrient intake with restricted diet.  Discussed adding in more complex carbohydrates and healthy fats to support energy levels, hormones, and daily workouts.     Insomnia- improved    Stress very high at year end - professionally.  Discussed far reaching health repercussions of long term stress  Rec  and decreasing stressors where possible.    ?Menopausal -starting to have heat intolerance.  Ablation at 46 years old and history of breast carcinoma in situ.    Resistant weight loss due to all of the above as well as ongoing inflammation (stress and possible food sensitivities suspected).     Fatigue persists  -> Check vitamin D and vitamin B12 levels  -> Start back on PhytoMulti    Follow-up in 2-3 months.  Follow-up lab testing in 4 to 6 weeks    Orders Placed This Visit:  No orders of the defined types were placed in this encounter.    No orders of the defined types were placed in this encounter.      Patient Instructions   NeuroCalm from [employer]    NeuroCalm™ is designed to promote activity of STEPHANIE and serotonin, which may help support healthy mood, cravings, and feelings of  calm, satiety, and satisfaction.* NeuroCalm™ contains PharmaGABA™, a form of STEPHANIE naturally manufactured via a fermentation process, which is considered more effective than chemically produced synthetic forms. Support for the production of calming neurotransmitters is also provided by L-theanine and taurine.    Made with non-GMO ingredients.    As a dietary supplement, take two capsules per day, or as directed by your health care practitioner.    Serving Size: Two Capsules    Amount Per Serving  Vitamin B-6 ... 2.5mg  (as Pyridoxal-5-Phosphate)  Vitamin B-12 ... 1000mcg  (as Methylcobalamin)  Magnesium ... 75mg  (as Di-Magnesium Malate)  Inositol ... 400mg  Taurine ... 300mg  Chamomile ... 200mg  (Matricaria chamomilla)(flower)  gamma-Aminobutyric acid ... 100mg  (as PharmaGABA™)  L-Theanine ... 100mg  5-HTP ... 50mg  (5-Hydroxytryptophan)  Phosphatidylserine ... 50mg  (from sunflower lecithin)    Other Ingredients: Cellulose (capsule), silicon dioxide, vegetable stearate.  This product does not contain gluten.     ------------------------------------------------------------------------------------    : Florencia Last   *can text or leave message on phone [phone].  [email or web address]   -----------------------------------------------------------------------------------    Get labs done after the start of the new year.     ------------------------------------------------------------------------------------  SIX FACTORS TO FIT book by Dr. Alex Harry    It is a habits-based program that uses personalized strategies instead of generic advice to help you lose weight in a healthy way. It targets YOUR issues that have been holding YOU back from losing weight and keeping it off.    Using nutritional nudges and lifestyle habit tweaks, Six Factors to Fit helps you ease into a healthier and more energetic life. More info and where to take the quiz to find out what factors  personally keep you from weight loss/maintenance:   [email or web address]://[employer]/six-factors-to-fit/     Return in about 3 months (around 3/17/2025) for x60min.    Patient affirmed understanding of plan and all questions were answered.     Daniela Forrest PA-C

## 2024-12-17 NOTE — PATIENT INSTRUCTIONS
NeuroCalm from [employer]    NeuroCalm™ is designed to promote activity of STEPHANIE and serotonin, which may help support healthy mood, cravings, and feelings of calm, satiety, and satisfaction.* NeuroCalm™ contains PharmaGABA™, a form of STEPHNAIE naturally manufactured via a fermentation process, which is considered more effective than chemically produced synthetic forms. Support for the production of calming neurotransmitters is also provided by L-theanine and taurine.    Made with non-GMO ingredients.    As a dietary supplement, take two capsules per day, or as directed by your health care practitioner.    Serving Size: Two Capsules    Amount Per Serving  Vitamin B-6 ... 2.5mg  (as Pyridoxal-5-Phosphate)  Vitamin B-12 ... 1000mcg  (as Methylcobalamin)  Magnesium ... 75mg  (as Di-Magnesium Malate)  Inositol ... 400mg  Taurine ... 300mg  Chamomile ... 200mg  (Matricaria chamomilla)(flower)  gamma-Aminobutyric acid ... 100mg  (as PharmaGABA™)  L-Theanine ... 100mg  5-HTP ... 50mg  (5-Hydroxytryptophan)  Phosphatidylserine ... 50mg  (from sunflower lecithin)    Other Ingredients: Cellulose (capsule), silicon dioxide, vegetable stearate.  This product does not contain gluten.     ------------------------------------------------------------------------------------    : Florencia Last   *can text or leave message on phone [phone].  [email or web address]   -----------------------------------------------------------------------------------    Get labs done after the start of the new year.     ------------------------------------------------------------------------------------  SIX FACTORS TO FIT book by Dr. Alex Harry    It is a habits-based program that uses personalized strategies instead of generic advice to help you lose weight in a healthy way. It targets YOUR issues that have been holding YOU back from losing weight and keeping it off.    Using nutritional nudges and  lifestyle habit tweaks, Six Factors to Fit helps you ease into a healthier and more energetic life. More info and where to take the quiz to find out what factors personally keep you from weight loss/maintenance:   [email or web address]/six-factors-to-[email or web address]

## 2025-01-02 NOTE — TELEPHONE ENCOUNTER
A refill request was received for:  Requested Prescriptions     Pending Prescriptions Disp Refills    NP THYROID 60 MG Oral Tab [Pharmacy Med Name: NP Thyroid 60 MG Oral Tablet] 30 tablet 0     Sig: Take 1 tablet by mouth once daily     Last refill date:  10/8/24   Qty: 30 and 2   Dx: hypothyroidism   Last office visit: 12/17/24   When is follow up due:  3/17/25

## 2025-01-03 RX ORDER — LEVOTHYROXINE, LIOTHYRONINE 38; 9 UG/1; UG/1
60 TABLET ORAL DAILY
Qty: 30 TABLET | Refills: 2 | Status: SHIPPED | OUTPATIENT
Start: 2025-01-03

## 2025-02-19 ENCOUNTER — PATIENT MESSAGE (OUTPATIENT)
Dept: FAMILY MEDICINE CLINIC | Facility: CLINIC | Age: 60
End: 2025-02-19

## 2025-02-19 DIAGNOSIS — J01.00 SUBACUTE MAXILLARY SINUSITIS: Primary | ICD-10-CM

## 2025-02-19 DIAGNOSIS — Z87.891 HISTORY OF TOBACCO ABUSE: ICD-10-CM

## 2025-02-19 RX ORDER — PREDNISONE 10 MG/1
TABLET ORAL
Qty: 18 TABLET | Refills: 0 | Status: SHIPPED | OUTPATIENT
Start: 2025-02-19

## 2025-02-19 RX ORDER — DOXYCYCLINE HYCLATE 100 MG
100 TABLET ORAL 2 TIMES DAILY
Qty: 20 TABLET | Refills: 0 | Status: SHIPPED | OUTPATIENT
Start: 2025-02-19

## 2025-02-26 ENCOUNTER — LAB ENCOUNTER (OUTPATIENT)
Dept: LAB | Age: 60
End: 2025-02-26
Attending: PHYSICIAN ASSISTANT
Payer: COMMERCIAL

## 2025-02-26 ENCOUNTER — OFFICE VISIT (OUTPATIENT)
Dept: INTEGRATIVE MEDICINE | Facility: CLINIC | Age: 60
End: 2025-02-26
Payer: COMMERCIAL

## 2025-02-26 VITALS
HEART RATE: 71 BPM | OXYGEN SATURATION: 97 % | SYSTOLIC BLOOD PRESSURE: 114 MMHG | BODY MASS INDEX: 31.86 KG/M2 | DIASTOLIC BLOOD PRESSURE: 78 MMHG | HEIGHT: 68 IN | WEIGHT: 210.19 LBS

## 2025-02-26 DIAGNOSIS — E34.8 ENDOCRINE AXIS DYSFUNCTION: ICD-10-CM

## 2025-02-26 DIAGNOSIS — E88.819 INSULIN RESISTANCE: ICD-10-CM

## 2025-02-26 DIAGNOSIS — E06.3 HYPOTHYROIDISM DUE TO HASHIMOTO'S THYROIDITIS: Primary | ICD-10-CM

## 2025-02-26 DIAGNOSIS — R53.82 CHRONIC FATIGUE: ICD-10-CM

## 2025-02-26 DIAGNOSIS — R79.89 LOW VITAMIN D LEVEL: ICD-10-CM

## 2025-02-26 DIAGNOSIS — E61.7 DEFICIENCY OF MULTIPLE NUTRIENT ELEMENTS: ICD-10-CM

## 2025-02-26 DIAGNOSIS — E06.3 HYPOTHYROIDISM DUE TO HASHIMOTO'S THYROIDITIS: ICD-10-CM

## 2025-02-26 DIAGNOSIS — G47.00 INSOMNIA, UNSPECIFIED TYPE: ICD-10-CM

## 2025-02-26 LAB
ALBUMIN SERPL-MCNC: 4.5 G/DL (ref 3.2–4.8)
ALBUMIN/GLOB SERPL: 1.8 {RATIO} (ref 1–2)
ALP LIVER SERPL-CCNC: 105 U/L
ALT SERPL-CCNC: 24 U/L
ANION GAP SERPL CALC-SCNC: 5 MMOL/L (ref 0–18)
AST SERPL-CCNC: 27 U/L (ref ?–34)
BILIRUB SERPL-MCNC: 1 MG/DL (ref 0.3–1.2)
BUN BLD-MCNC: 16 MG/DL (ref 9–23)
BUN/CREAT SERPL: 14.7 (ref 10–20)
CALCIUM BLD-MCNC: 9.4 MG/DL (ref 8.7–10.4)
CHLORIDE SERPL-SCNC: 105 MMOL/L (ref 98–112)
CO2 SERPL-SCNC: 31 MMOL/L (ref 21–32)
CREAT BLD-MCNC: 1.09 MG/DL
DHEA-S SERPL-MCNC: 118.7 UG/DL
EGFRCR SERPLBLD CKD-EPI 2021: 59 ML/MIN/1.73M2 (ref 60–?)
FASTING STATUS PATIENT QL REPORTED: YES
GLOBULIN PLAS-MCNC: 2.5 G/DL (ref 2–3.5)
GLUCOSE BLD-MCNC: 95 MG/DL (ref 70–99)
OSMOLALITY SERPL CALC.SUM OF ELEC: 293 MOSM/KG (ref 275–295)
POTASSIUM SERPL-SCNC: 3.5 MMOL/L (ref 3.5–5.1)
PROT SERPL-MCNC: 7 G/DL (ref 5.7–8.2)
SODIUM SERPL-SCNC: 141 MMOL/L (ref 136–145)
T3FREE SERPL-MCNC: 2.96 PG/ML (ref 2.4–4.2)
T4 FREE SERPL-MCNC: 1.1 NG/DL (ref 0.8–1.7)
TSI SER-ACNC: 2.71 UIU/ML (ref 0.55–4.78)
VIT B12 SERPL-MCNC: 464 PG/ML (ref 211–911)
VIT D+METAB SERPL-MCNC: 74.2 NG/ML (ref 30–100)

## 2025-02-26 PROCEDURE — 84481 FREE ASSAY (FT-3): CPT

## 2025-02-26 PROCEDURE — 82306 VITAMIN D 25 HYDROXY: CPT

## 2025-02-26 PROCEDURE — 84443 ASSAY THYROID STIM HORMONE: CPT

## 2025-02-26 PROCEDURE — 36415 COLL VENOUS BLD VENIPUNCTURE: CPT

## 2025-02-26 PROCEDURE — 3008F BODY MASS INDEX DOCD: CPT | Performed by: PHYSICIAN ASSISTANT

## 2025-02-26 PROCEDURE — 82627 DEHYDROEPIANDROSTERONE: CPT

## 2025-02-26 PROCEDURE — 84482 T3 REVERSE: CPT

## 2025-02-26 PROCEDURE — 80053 COMPREHEN METABOLIC PANEL: CPT

## 2025-02-26 PROCEDURE — 99215 OFFICE O/P EST HI 40 MIN: CPT | Performed by: PHYSICIAN ASSISTANT

## 2025-02-26 PROCEDURE — 3078F DIAST BP <80 MM HG: CPT | Performed by: PHYSICIAN ASSISTANT

## 2025-02-26 PROCEDURE — 82607 VITAMIN B-12: CPT

## 2025-02-26 PROCEDURE — 84140 ASSAY OF PREGNENOLONE: CPT

## 2025-02-26 PROCEDURE — 3074F SYST BP LT 130 MM HG: CPT | Performed by: PHYSICIAN ASSISTANT

## 2025-02-26 PROCEDURE — 84439 ASSAY OF FREE THYROXINE: CPT

## 2025-02-26 PROCEDURE — G2211 COMPLEX E/M VISIT ADD ON: HCPCS | Performed by: PHYSICIAN ASSISTANT

## 2025-02-26 RX ORDER — TIRZEPATIDE 2.5 MG/.5ML
2.5 INJECTION, SOLUTION SUBCUTANEOUS WEEKLY
Qty: 2 ML | Refills: 0 | Status: SHIPPED | OUTPATIENT
Start: 2025-02-26 | End: 2025-03-20

## 2025-02-26 NOTE — PROGRESS NOTES
Shruthi Arroyo is a 60 year old female.  Chief Complaint   Patient presents with    Follow - Up     Weight management        HPI:   59-year-old postmenopausal female patient with known history of breast carcinoma in situ with tamoxifen presents today for follow up.    Weight gain persists  Has been on GLP-1 in the past from [employer] (x1mo) but was not consistent due to sourcing issues. It helped with food chatter and inflammation.   Has goal of 25-40lb weight loss    Still taking all of the supplements [employer] as well and it is helping with some of the stress.     Hashimoto's: On NP Thyroid 60mg   Weight Gain  Running colder  Energy low  Denies heart palpitations or changes to skin/nails/hair    Insomnia- improved, has been able to sleep 7hours. Is on the pregnenolone and mg which helps.   Did start the STEPHANIE as needed.     ?Menopausal -starting to have heat intolerance.  Ablation at 46 years old and history of breast carcinoma in situ.    GI - Denies constipation, no more bloating; sometimes will have lose stool earlier in the AM but fine later in day    --------------------------------------------Last OV---------------------------------------    A lot of stress at work    Started the relora more regularly more recently.     Resistant weight loss   IgG food testing     Hashimoto's hypothyroid-thyroid hormones have improved on NP thyroid 60 mg.  Thyroid antibodies remain elevated.  Considered LDN therapy    Low adrenal hormones-supplementing with DHEA-S and pregnenolone   -> Does report significantly increased stress over the last 2 months  -> Recheck adrenal hormones  -> Suspect low nutrient intake with restricted diet.  Started to add in more complex carbohydrates and healthy fats to support energy levels, hormones, and daily workouts.  Handouts from detox and renew plan shared for meal ideas.     Insomnia- improved, has been able to sleep 7hours. Is on the pregnenolone and mg which helps.   Did start  the STEPHANIE as needed.     ?Menopausal -starting to have heat intolerance.  Ablation at 46 years old and history of breast carcinoma in situ.    Fatigue persists  Did not start the PhytoMulti  --------------------------------------------Last OV---------------------------------------------------    Labs: 8/10/24 - to review today    Feels stronger with adding one day of weight training    Still feels tired   Not dropping any weight    Did not get the [employer] FIT/Infinite test yet due to finances and not getting email from [facility].    Has been on the increased dose of NP 60mg since June, but unsure if it helped since stress drastically increased at the same time.  No longer with cold intolerance, but now feeling less tolerant of heat, some hot flashes over last 2-3weeks   Still with hoarse voice when stressed  Denies heart palpitations, anxiety.     Sleeping more heavy and getting 7hours  Has been on dhea and Pregnenolone.        ---------------------------------Last OV 3/26/24------------------------------    Fatigue   Better sleep - taking Mg glycinate, Pregnenolone 20mg. Has not taken the STEPHANIE yet.  Weight training 3d/wk, treadmill 2d/wk - feeling stronger but not noticing losing weight  More stress in last 2mos    Thyroid antibodies/thyromegaly/nodules -  Still fatigued, dry skin, dry mouth (which phentermine was causing), hoarse voice. Denies changes in mood, constipation, hair.   Ultrasound 8/27/23 -4 nodules, 1 new, stable.     Low DHEA-S and Pregnenolone.  Exercise recovery better. Still no libido. Is gaining more muscle with  now, but still with stagnant weight.  Fatigued by end of afternoon/early evening.     Still with sugar cravings - tries to have fruit after meals in afternoon.    Weight Gain - Hyperinsulinemia  -strong family h/o morbid obesity and has struggled with weight issues most of her life.   -100lb weight loss with [employer] at about 35yo and was able to keep it off for awhile.   -Diagnosed  with breast carcinoma in situ - had radiation and put on tamoxifen x4yrs.    -Ablation at 45yo due to menorrhagia. Quit smoking about that time. Then weight gain started despite exercise and diet changes.   -Discontinued the Phentermine, Ozempic (only on for few months but sourcing was an issue and never made it to the therapeutic) with Dr. Shannon. Ozempic did help with inflammation and she felt better, but no weight loss.      GI - regular daily BM, cholecystectomy in 1990s. Occasional bloating, particularly with fruit, so cut out most fruit.     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   H/o HTN now just on diuretic due to LE edema.   Childhood - frequent strep and sinus infections     REVIEW OF SYSTEMS:   Review of Systems   Constitutional:  Positive for fatigue. Negative for chills and fever. Unexpected weight change: weight gain.  HENT:  Positive for voice change (hoarse voice when stressed). Negative for sore throat.    Eyes: Negative.  Negative for visual disturbance.   Respiratory: Negative.  Negative for cough, shortness of breath and wheezing.    Cardiovascular: Negative.  Negative for chest pain and palpitations.   Gastrointestinal:  Negative for abdominal pain, constipation, diarrhea, nausea and vomiting.   Endocrine: Positive for heat intolerance. Negative for cold intolerance.   Genitourinary: Negative.    Musculoskeletal: Negative.    Skin: Negative.    Allergic/Immunologic: Negative.    Neurological:  Positive for tremors. Negative for weakness and headaches.   Hematological: Negative.    Psychiatric/Behavioral:  Negative for sleep disturbance.         Labile mood            FAMILY HISTORY:      Family History   Problem Relation Age of Onset    Diabetes Father     Cancer Mother 70        breast     Breast Cancer Mother 70    Hypertension Mother     Cancer Paternal Uncle         stomach CA   Mother, overactive thyroid   All siblings - obesity    MEDICAL HISTORY:     Past Medical  History:    Allergic rhinitis, cause unspecified    Allergic rhinitis, cause unspecified    Anxiety    Arthritis    Mild    Bloating    Last few months - occasional    Blurred vision    Last few months.    Body piercing    Ears    Breast cancer (HCC)    Carcinoma in situ of breast    Carpal tunnel syndrome    Chronic rhinitis    Decorative tattoo    Diarrhea, unspecified    Occasional last few months    Essential hypertension, benign    Fatigue    Years. Can fall asleep any time I sit long periods    Flatulence/gas pain/belching    Last 6 months    Frequent UTI    Heart palpitations    Recently gotten worse but has happened for many years    Heavy menses    Until 2011. Had ablasion.    History of cardiac murmur    Most of my life - mild    Hoarseness, chronic    Sometimes its hard to speak. Comes w diff swallowing    Pain in joints    Problems with swallowing    Occ difficulty swallow food. Voice hoarse same time    Stress    Illness and new job    Thyroid disorder    pt. has cysts on thyroids    Tobacco abuse    Unspecified essential hypertension    Wears glasses       CURRENT MEDICATIONS:     Current Outpatient Medications   Medication Sig Dispense Refill    Tirzepatide-Weight Management (ZEPBOUND) 2.5 MG/0.5ML Subcutaneous Solution Auto-injector Inject 2.5 mg into the skin once a week for 4 doses. 2 mL 0    Doxycycline Hyclate 100 MG Oral Tab Take 1 tablet (100 mg total) by mouth 2 (two) times daily. 20 tablet 0    thyroid (NP THYROID) 60 MG Oral Tab Take 1 tablet by mouth once daily 30 tablet 2    triamterene-hydroCHLOROthiazide 75-50 MG Oral Tab Take 0.5 tablets by mouth daily. 135 tablet 2       SOCIAL HISTORY:   Lifestyle Factors affecting health:  Diet - Avoids carbs and gluten. Eating a lot more protein. Egg whites and cottage cheese for breakfast, protein bar or drink for lunch. Chicken and mixed veggies, fruit for dinner. Limits veggies and carbs.    Exercise - regular, will no longer work with ,  but has routines to do at home, walks dogs  Stress - improved over last 6mos, but fluctuates  Sleep - Better. Had Not been good for years. Up at 4am for work, to bed by 9:30pm.     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 [employer]    Social History     Socioeconomic History    Marital status:    Tobacco Use    Smoking status: Former     Current packs/day: 0.00     Average packs/day: 1 pack/day for 30.0 years (30.0 ttl pk-yrs)     Types: Cigarettes     Start date: 10/1/1984     Quit date: 10/1/2014     Years since quitting: 10.4    Smokeless tobacco: Never   Vaping Use    Vaping status: Never Used   Substance and Sexual Activity    Alcohol use: Yes     Alcohol/week: 3.0 standard drinks of alcohol     Types: 3 Standard drinks or equivalent per week     Comment: minimal    Drug use: No    Sexual activity: Yes     Partners: Male     Comment: No HIV high risk behavior   Other Topics Concern    Caffeine Concern Yes     Comment: 2 cups a day    Exercise Yes     Comment: 5x/week    Seat Belt Yes     Social Drivers of Health      [facility]    Housing Stability       SURGICAL HISTORY:     Past Surgical History:   Procedure Laterality Date    Biopsy of breast, incisional      Breast surgery      Cholecystectomy      Colonoscopy N/A 1/31/2017    Procedure: COLONOSCOPY, POSSIBLE BIOPSY, POSSIBLE POLYPECTOMY 63934;  Surgeon: Luc Calabrese MD;  Location: [facility]    Colonoscopy & polypectomy  1/17- repeat 1/18    mulltiple polyps, tics (adenomas, TVA)    Gastro - dmg  1/17    esophageal nodule (papilloma)    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   Constitutional:       General: She is not in acute distress.     Appearance: Normal appearance.   HENT:      Mouth/Throat:      Mouth: Mucous membranes are moist.      Pharynx: Oropharynx is clear.   Eyes:      Extraocular Movements: Extraocular movements intact.      Conjunctiva/sclera: Conjunctivae normal.   Cardiovascular:      Rate and Rhythm: Normal rate and regular rhythm.      Pulses: Normal pulses.      Heart sounds: Normal heart sounds. No murmur heard.  Pulmonary:      Effort: Pulmonary effort is normal.      Breath sounds: Normal breath sounds.   Musculoskeletal:         General: No swelling or deformity.   Skin:     General: Skin is warm and dry.   Neurological:      General: No focal deficit present.      Mental Status: She is alert and oriented to person, place, and time.   Psychiatric:         Mood and Affect: Mood normal.         Behavior: Behavior normal.         Thought Content: Thought content normal.         Judgment: Judgment normal.          ASSESSMENT AND PLAN:     No visits with results within 6 Month(s) from this visit.   Latest known visit with results is:   [facility] Lab Encounter on 08/10/2024   Component Date Value Ref Range Status    TSH 08/10/2024 3.560  0.550 - 4.780 mIU/mL Final    Free T4 08/10/2024 1.1  0.8 - 1.7 ng/dL Final    Reverse T3 08/10/2024 17.7  9.2 - 24.1 ng/dL Final    T3 Free 08/10/2024 3.22  2.40 - 4.20 pg/mL Final    Anti-Thyroglobulin 08/10/2024 149 (H)  <60 U/mL Final    Anti-Thyroperoxidase 08/10/2024 719 (H)  <60 U/mL Final      No results found.    1. Hypothyroidism due to Hashimoto's thyroiditis    2. Chronic fatigue    3. Insomnia, unspecified type    4. Insulin resistance  - Tirzepatide-Weight Management (ZEPBOUND) 2.5 MG/0.5ML Subcutaneous Solution Auto-injector; Inject 2.5 mg into the skin once a week for 4 doses.  Dispense: 2 mL; Refill: 0    5. BMI 31.0-31.9,adult  - Tirzepatide-Weight  Management (ZEPBOUND) 2.5 MG/0.5ML Subcutaneous Solution Auto-injector; Inject 2.5 mg into the skin once a week for 4 doses.  Dispense: 2 mL; Refill: 0        Time spent with patient: Over 50 minutes spent in chart review and in direct communication with patient obtaining and reviewing history, creating a unique care plan, explaining the rationale for treatment, reviewing potential SE and overall treatment plan,  documenting all clinical information in Epic. Over 50% of this time was in education, counseling and coordination of care.     Problem List Items Addressed This Visit    None  Visit Diagnoses       Hypothyroidism due to Hashimoto's thyroiditis    -  Primary    Chronic fatigue        Insomnia, unspecified type        Insulin resistance        Relevant Medications    Tirzepatide-Weight Management (ZEPBOUND) 2.5 MG/0.5ML Subcutaneous Solution Auto-injector    BMI 31.0-31.9,adult        Relevant Medications    Tirzepatide-Weight Management (ZEPBOUND) 2.5 MG/0.5ML Subcutaneous Solution Auto-injector              Endocrine dysfunction:  Hashimoto's hypothyroid-thyroid hormones have improved on NP thyroid 60 mg.  +Thyroid antibodies   ->Lab done this AM and not back yet  -> May still Consider LDN therapy in the future    Low adrenal hormones-supplementing with DHEA-S and pregnenolone   -> Does report significantly increased stress over the last 6 months  -> Recheck adrenal hormones (done today)  -> Suspect low nutrient intake with restricted diet.  Discussed adding in more complex carbohydrates and healthy fats to support energy levels, hormones, and daily workouts.     Insomnia- improved    Stress very high at year end - professionally.  She is making ryan positive shifts moving forward in her professional and personal space.    ?Menopausal -starting to have heat intolerance.  Ablation at 46 years old and history of breast carcinoma in situ.    Resistant weight loss due to all of the above as well as ongoing  inflammation   Pt would like to proceed with trying GLP-1 again as she was slightly successful but only one for 1month before.   Discussed medication options and pt would like to proceed with trial of Wegovy. Discussed risks, benefits, and potential side effects. Pt denies personal or family h/o of MEN. Also discussed the following plan that is imperative to being successful on medication:    1) Prioritize protein and vegetable intake, fresh fruits.   2) If constipation occurs, start Magnesium Citrate (CALM a good option as it can be titrated up/down to therapeutic dose.    Fatigue persists  -> Check vitamin D and vitamin B12 levels  -> Start back on PhytoMulti    Follow-up in 2-3 months.  Follow-up lab testing in 4 to 6 weeks    Orders Placed This Visit:  No orders of the defined types were placed in this encounter.    No orders of the defined types were placed in this encounter.      Patient Instructions   [employer] for work office   See handouts for breakfast ideas  Let us know after week 2-3 on GLP-1 how you feel.   Things to ensure while taking a GLP-1:  1) Prioritize protein and vegetable intake, fresh fruits.   2) If constipation occurs, start Magnesium Citrate (CALM a good option as it can be titrated up/down to therapeutic dose.  3) First month the medication will be taken at the lowest dose (0.25mg) x4wks. If tolerated, increase the dose the second month to 0.5mg x4wks. If tolerated, increase the dose the third month to 1mg.  4) Follow up in 3mos to assess progress and dosing moving forward.   5) If not eating 3 meals due to decreased hunger, consider adding a Multivitamin to ensure adequate vitamin and nutrient status.   6) Maintain a good exercise program of at least 30min/day.      Return in about 3 months (around 5/26/2025) for x60min: f/u on start of semaglutide, weight mgt.    Patient affirmed understanding of plan and all questions were answered.     Daniela Forrest,  ION

## 2025-02-26 NOTE — PATIENT INSTRUCTIONS
[employer] for work office   See handouts for breakfast ideas  Let us know after week 2-3 on GLP-1 how you feel.   Things to ensure while taking a GLP-1:  1) Prioritize protein and vegetable intake, fresh fruits.   2) If constipation occurs, start Magnesium Citrate (CALM a good option as it can be titrated up/down to therapeutic dose.  3) First month the medication will be taken at the lowest dose (0.25mg) x4wks. If tolerated, increase the dose the second month to 0.5mg x4wks. If tolerated, increase the dose the third month to 1mg.  4) Follow up in 3mos to assess progress and dosing moving forward.   5) If not eating 3 meals due to decreased hunger, consider adding a Multivitamin to ensure adequate vitamin and nutrient status.   6) Maintain a good exercise program of at least 30min/day.    
no

## 2025-03-02 LAB — PREGNENOLONE: 26 NG/DL

## 2025-03-03 LAB — REVERSE T3: 17.8 NG/DL

## 2025-03-11 ENCOUNTER — PATIENT MESSAGE (OUTPATIENT)
Dept: INTEGRATIVE MEDICINE | Facility: CLINIC | Age: 60
End: 2025-03-11

## 2025-03-17 ENCOUNTER — TELEPHONE (OUTPATIENT)
Facility: CLINIC | Age: 60
End: 2025-03-17

## 2025-03-17 DIAGNOSIS — M25.512 LEFT SHOULDER PAIN, UNSPECIFIED CHRONICITY: Primary | ICD-10-CM

## 2025-03-17 NOTE — TELEPHONE ENCOUNTER
Established patient scheduled via [employer] for left shoulder pain.    Future Appointments   Date Time Provider Department Center   3/19/2025  1:10 PM Leoncio Hills [id number]     Please advise if imaging is needed.

## 2025-03-19 ENCOUNTER — OFFICE VISIT (OUTPATIENT)
Dept: ORTHOPEDICS CLINIC | Facility: CLINIC | Age: 60
End: 2025-03-19
Payer: COMMERCIAL

## 2025-03-19 DIAGNOSIS — M75.02 ADHESIVE CAPSULITIS OF LEFT SHOULDER: Primary | ICD-10-CM

## 2025-03-19 PROCEDURE — 99213 OFFICE O/P EST LOW 20 MIN: CPT | Performed by: ORTHOPAEDIC SURGERY

## 2025-03-19 PROCEDURE — 20610 DRAIN/INJ JOINT/BURSA W/O US: CPT | Performed by: ORTHOPAEDIC SURGERY

## 2025-03-19 RX ORDER — TRIAMCINOLONE ACETONIDE 40 MG/ML
40 INJECTION, SUSPENSION INTRA-ARTICULAR; INTRAMUSCULAR ONCE
Status: COMPLETED | OUTPATIENT
Start: 2025-03-19 | End: 2025-03-19

## 2025-03-19 RX ORDER — KETOROLAC TROMETHAMINE 30 MG/ML
30 INJECTION, SOLUTION INTRAMUSCULAR; INTRAVENOUS ONCE
Status: COMPLETED | OUTPATIENT
Start: 2025-03-19 | End: 2025-03-19

## 2025-03-19 RX ADMIN — KETOROLAC TROMETHAMINE 30 MG: 30 INJECTION, SOLUTION INTRAMUSCULAR; INTRAVENOUS at 13:19:00

## 2025-03-19 RX ADMIN — TRIAMCINOLONE ACETONIDE 40 MG: 40 INJECTION, SUSPENSION INTRA-ARTICULAR; INTRAMUSCULAR at 13:19:00

## 2025-03-19 NOTE — PROCEDURES
Left Shoulder Glenohumeral Joint Injection    Name: Shruthi Arroyo   MRN: [id number]  Date: 3/19/2025     Clinical Indications:   Adhesive Capsulitis.     After informed consent, the injection site was marked, sterilized with topical chlorhexidine antiseptic, and locally anesthetized with skin refrigerant.    The patient was seated upright and the shoulder was exposed. Using sterile technique: 1 mL of 30mg/mL of Ketorolac, 2 mL of 0.5% Bupivicaine, 2 mL of 1% Lidocaine, and 1 mg of 40mg/mL of Triamcinolone (Kenalog) was injected with a Anterior approach utilizing a 22 gauge needle.  A band-aid was applied.  The patient tolerated the procedure well.        Leoncio Hills MD  Knee, Shoulder, & Elbow Surgery / Sports Medicine Specialist  Orthopaedic Surgery  [address] [place] [address].org  [email or web address]  t: [phone]  o: [phone]  f: [phone]

## 2025-03-19 NOTE — PROGRESS NOTES
Orthopaedic Surgery  [address] [place] [address]  [phone]       Name: Shruthi Arroyo   MRN: [id number]  Date: 3/19/2025     REASON FOR VISIT: Follow up of recurrent left shoulder adhesive capsulitis.    INTERVAL HISTORY:  Shruthi Arroyo is a 60 year old right-hand dominant female who presents today for repeat evaluation of the shoulder.    To summarize: Original left shoulder injury after a fall onto an outstretched arm on 5/14/2022. She has since been seen recurrently for adhesive capsulitis.    We have been managing conservatively with corticosteroid injections on 2/4/21, 5/10/21 and 11/10/23.     Currently, her pain is rated up to 8/10.     PE:   There were no vitals filed for this visit.  Estimated body mass index is 31.96 kg/m² as calculated from the following:    Height as of 2/26/25: 5' 8\" (1.727 m).    Weight as of 2/26/25: 210 lb 3.2 oz.    Physical Exam  Constitutional:       Appearance: Normal appearance.   HENT:      Head: Normocephalic and atraumatic.   Eyes:      Extraocular Movements: Extraocular movements intact.   Neck:      Musculoskeletal: Normal range of motion and neck supple.   Cardiovascular:      Pulses: Normal pulses.   Pulmonary:      Effort: Pulmonary effort is normal. No respiratory distress.   Abdominal:      General: There is no distension.   Skin:     General: Skin is warm.      Capillary Refill: Capillary refill takes less than 2 seconds.      Findings: No bruising.   Neurological:      General: No focal deficit present.      Mental Status: She is alert.   Psychiatric:         Mood and Affect: Mood normal.     Examination of the left shoulder demonstrates:     Skin is intact, warm and dry.   Cervical:  Full ROM  Spurling's  Negative    Deformity:   none  Atrophy:   none    Scapular winging: Negative    Palpation:     AC Joint   Negative  Biceps Tendon  Negative  Greater Tuberosity Negative    ROM:   Forward Flexion:  full and symmetric  Abduction:   full  and symmetric  External Rotation:  full and symmetric  Internal Rotation:  full and symmetric    Rotator Cuff Strength:   Supraspinatus:   5/5  Subscapularis:   5/5  Infraspinatus/Teres: 5/5    Pain with resistive strength testing.    Provocative Tests:   Seth:   Negative  Speed's:   Negative  [place]'s:   Negative  Lift-off:    Negative  Apprehension:  Negative  Sulcus Sign:   Negative    Neurovascular Upper Extremity (Bilateral)  Motor:    5/5 EPL, Finger Abduction, , Pinch, Deltoid  Sensation:   intact to light touch median, ulnar, radial and axillary nerve  Circulation:   Normal, 2+ radial pulse    The contralateral upper extremity is without limitation in range of motion or strength, no positive provocative maneuvers.      Radiographic Examination/Diagnostics:    Shoulder MRI personally viewed, independently interpreted and radiology report was reviewed.    MRI SHOULDER, LEFT (CPT=73221)    Result Date: 03/19/2025  PROCEDURE: MRI SHOULDER, LEFT (CPT=73221) COMPARISON: [place], , MRI SHOULDER, LEFT (CPT=73221), 1/28/2021, 1:27 PM. INDICATIONS: S46.002A Injury of left rotator cuff, initial encounter TECHNIQUE: Multiplanar imaging of the shoulder including oblique coronal, axial and sagittal imaging was acquired including proton density fat suppression technique. Images were performed without intravenous gadolinium. PATIENT STATED HISTORY: (As transcribed by Technologist) Patient has right shoulder pain after falling on some stairs in May FINDINGS: ROTATOR CUFF: There is increased signal within the substance of the supraspinatus tendon distally consistent with tendinopathy. There is no full-thickness tear. The imaging appearance is not significantly changed in the interval since the previous study. The remaining rotator cuff tendons are intact. MUSCULATURE: No strain, edema, or atrophy. AC JOINT/ACROMION: No significant arthritis or acute injury. Normal marrow and cortical signal. Type I acromion.  Normal subacromial space without evidence of subacromial impingement. BICEPS/LABRAL COMPLEX: Increased signal in the posterior superior labrum described previously is actually less conspicuous on this study and most likely represents labral degeneration. There is no paralabral cyst. Biceps tendon is intact. GLENOHUMERAL JOINT: Mild subchondral edema is noted in the posterior superior articular humeral head. There is subchondral cyst formation in this location in this is most likely reactive and degenerative signal. Bone contusion could have this appearance. There is no fracture detected.     CONCLUSION:   1. Tendinopathy of supraspinatus tendon is again noted and not changed since previous study. There is no full-thickness tear.   2. There is degeneration of the posterior superior labrum without tear or paralabral cyst.   3. Mild subchondral reactive edema and cyst formation posterior humeral head is most likely degenerative although could represent a bone contusion.   4. Biceps tendon is intact.   Dictated by (CST): Abhinav Brody MD on 6/23/2022 at 12:55 PM Finalized by (CST): Abhinav Brody MD on 6/23/2022 at 12:59 PM      IMPRESSION: Shruthi Arroyo is a 60 year old with left adhesive capsulitis symptomatic since 2021. Previously treated with corticosteroid injections on 2/4/21, 5/10/21 and 11/10/23.     We elected to maximize conservative management with a repeat intra-articular corticosteroid and ketorolac injection.    PLAN:   We reviewed the treatment of this disease condition.  Fortunately, treatment is amenable to conservative treatment which we chose to optimize at today's visit.  After a discussion of a variety of conservative treatment options, I recommended intra-articular injection with corticosteroid and ketorolac.       We elected to proceed with the injection procedure at today's visit. We discussed the risk and benefits of the procedure; including, but not limited to: infection, injury to  blood vessels, nerve injury, prolonged pain, swelling, site soreness, failure to progress, and need for advanced treatments.  The patient voiced understanding and agreed to proceed with the treatment plan.      FOLLOW-UP:   Return to clinic on an as needed basis.       Leoncio Hills MD  Knee, Shoulder, & Elbow Surgery / Sports Medicine Specialist  Orthopaedic Surgery  [address] [place] [address] [id number] [place] [address].[email or web address]  [email or web address]  t: [phone]  o: [phone]  f: [phone]    This note was dictated using Dragon software.  While it was briefly proofread prior to completion, some grammatical, spelling, and word choice errors due to dictation may still occur.

## 2025-03-28 ENCOUNTER — PATIENT MESSAGE (OUTPATIENT)
Dept: INTEGRATIVE MEDICINE | Facility: CLINIC | Age: 60
End: 2025-03-28

## 2025-03-28 NOTE — TELEPHONE ENCOUNTER
A refill request was received for:  Requested Prescriptions     Pending Prescriptions Disp Refills    NP THYROID 60 MG Oral Tab [Pharmacy Med Name: NP Thyroid 60 MG Oral Tablet] 30 tablet 0     Sig: Take 1 tablet by mouth once daily     Last refill date:  1/3/2025  Qty: 30 tablets and 2  Dx: hypothyroid  Last office visit: 2/26/2025  When is follow up due: 5/26/2025      Future Appointments   Date Time Provider Department Center   7/22/2025  3:30 PM Daniela Forrest PA-C [facility] [place]

## 2025-03-30 ENCOUNTER — PATIENT MESSAGE (OUTPATIENT)
Dept: INTEGRATIVE MEDICINE | Facility: CLINIC | Age: 60
End: 2025-03-30

## 2025-03-30 DIAGNOSIS — E06.3 HYPOTHYROIDISM DUE TO HASHIMOTO'S THYROIDITIS: Primary | ICD-10-CM

## 2025-04-01 RX ORDER — LEVOTHYROXINE, LIOTHYRONINE 38; 9 UG/1; UG/1
60 TABLET ORAL DAILY
Qty: 30 TABLET | Refills: 5 | Status: SHIPPED | OUTPATIENT
Start: 2025-04-01

## 2025-04-01 RX ORDER — THYROID 15 MG/1
15 TABLET ORAL DAILY
Qty: 30 TABLET | Refills: 5 | Status: SHIPPED | OUTPATIENT
Start: 2025-04-01

## 2025-05-13 ENCOUNTER — PATIENT MESSAGE (OUTPATIENT)
Dept: ORTHOPEDICS CLINIC | Facility: CLINIC | Age: 60
End: 2025-05-13

## 2025-05-14 NOTE — TELEPHONE ENCOUNTER
LOV 3/19/25, left shoulder pain, steroid/ketorolac injection  Follow up PRN     Pt reports shoulder pain resolved for approx 1 month.  Now walking dogs, house cleaning, extended use of shoulder triggers shoulder pain.  Especially painful when reaching or sleeping  Pain now extends across collar bone up into the neck  Muscles stiff/painful.    Difficulty with sleep and working.    Has been using Icy Hot and Advil.    Denies swelling at arm/hand.  No change in strength.    Conservative measures were given.

## 2025-05-23 ENCOUNTER — OFFICE VISIT (OUTPATIENT)
Dept: ORTHOPEDICS CLINIC | Facility: CLINIC | Age: 60
End: 2025-05-23
Payer: COMMERCIAL

## 2025-05-23 ENCOUNTER — LAB ENCOUNTER (OUTPATIENT)
Dept: LAB | Age: 60
End: 2025-05-23
Attending: PHYSICIAN ASSISTANT
Payer: COMMERCIAL

## 2025-05-23 DIAGNOSIS — M75.02 ADHESIVE CAPSULITIS OF LEFT SHOULDER: Primary | ICD-10-CM

## 2025-05-23 DIAGNOSIS — E06.3 HYPOTHYROIDISM DUE TO HASHIMOTO'S THYROIDITIS: ICD-10-CM

## 2025-05-23 DIAGNOSIS — S46.002A INJURY OF LEFT ROTATOR CUFF, INITIAL ENCOUNTER: ICD-10-CM

## 2025-05-23 LAB
T3FREE SERPL-MCNC: 3 PG/ML (ref 2.4–4.2)
T4 FREE SERPL-MCNC: 1.2 NG/DL (ref 0.8–1.7)
TSI SER-ACNC: 1.89 UIU/ML (ref 0.55–4.78)

## 2025-05-23 PROCEDURE — 99214 OFFICE O/P EST MOD 30 MIN: CPT | Performed by: ORTHOPAEDIC SURGERY

## 2025-05-23 PROCEDURE — 84439 ASSAY OF FREE THYROXINE: CPT

## 2025-05-23 PROCEDURE — 36415 COLL VENOUS BLD VENIPUNCTURE: CPT

## 2025-05-23 PROCEDURE — 84481 FREE ASSAY (FT-3): CPT

## 2025-05-23 PROCEDURE — 84482 T3 REVERSE: CPT

## 2025-05-23 PROCEDURE — 84443 ASSAY THYROID STIM HORMONE: CPT

## 2025-05-23 NOTE — PROGRESS NOTES
Orthopaedic Surgery  [address] [place] [address]  [phone]       Name: Shruthi Arroyo   MRN: [id number]  Date: 5/23/2025     REASON FOR VISIT: Left shoulder pain.  Last cortisone injection: 03/19/2025 and 11/10/2023.     INTERVAL HISTORY:  Shruthi Arroyo is a 60 year old right-hand dominant female who presents today for repeat evaluation of the shoulder.     To summarize: Original left shoulder injury after a fall onto an outstretched arm on 5/14/2022. She has since been seen recurrently for adhesive capsulitis. Rated pain is 6/10 with pinching and weakness.        PE:   There were no vitals filed for this visit.  Estimated body mass index is 31.96 kg/m² as calculated from the following:    Height as of 2/26/25: 5' 8\" (1.727 m).    Weight as of 2/26/25: 210 lb 3.2 oz.    Physical Exam  Constitutional:       Appearance: Normal appearance.   HENT:      Head: Normocephalic and atraumatic.   Eyes:      Extraocular Movements: Extraocular movements intact.   Neck:      Musculoskeletal: Normal range of motion and neck supple.   Cardiovascular:      Pulses: Normal pulses.   Pulmonary:      Effort: Pulmonary effort is normal. No respiratory distress.   Abdominal:      General: There is no distension.   Skin:     General: Skin is warm.      Capillary Refill: Capillary refill takes less than 2 seconds.      Findings: No bruising.   Neurological:      General: No focal deficit present.      Mental Status: She is alert.   Psychiatric:         Mood and Affect: Mood normal.     Examination of the left shoulder demonstrates:     Skin is intact, warm and dry.   Cervical:  Full ROM  Spurling's  Negative    Deformity:   none  Atrophy:   none    Scapular winging: Negative    Palpation:     AC Joint   Negative  Biceps Tendon  Positive  Greater Tuberosity Negative    ROM:   Forward Flexion:  full and symmetric  Abduction:   90°  External Rotation:  full and symmetric  Internal Rotation:  full and  symmetric    Rotator Cuff Strength:   Supraspinatus:   4/5  Subscapularis:   5/5  Infraspinatus/Teres: 5/5    Provocative Tests:   Seth:   Positive  Speed's:   Positive  [place]'s:   Positive  Lift-off:    Negative  Apprehension:  Negative  Sulcus Sign:   Negative    Neurovascular Upper Extremity (Bilateral)  Motor:    5/5 EPL, Finger Abduction, , Pinch, Deltoid  Sensation:   intact to light touch median, ulnar, radial and axillary nerve  Circulation:   Normal, 2+ radial pulse    The contralateral upper extremity is without limitation in range of motion or strength, no positive provocative maneuvers.       Radiographic Examination/Diagnostics:    Shoulder MRI personally viewed, independently interpreted and radiology report was reviewed.     MRI SHOULDER, LEFT (CPT=73221)  Result Date: 03/19/2025  PROCEDURE: MRI SHOULDER, LEFT (CPT=73221)   COMPARISON: [place], , MRI SHOULDER, LEFT (CPT=73221), 1/28/2021, 1:27 PM.   INDICATIONS: S46.002A Injury of left rotator cuff, initial encounter   TECHNIQUE: Multiplanar imaging of the shoulder including oblique coronal, axial and sagittal imaging was acquired including proton density fat suppression technique. Images were performed without intravenous gadolinium.   PATIENT STATED HISTORY: (As transcribed by Technologist) Patient has right shoulder pain after falling on some stairs in May   FINDINGS:   ROTATOR CUFF: There is increased signal within the substance of the supraspinatus tendon distally consistent with tendinopathy. There is no full-thickness tear. The imaging appearance is not significantly changed in the interval since the previous study. The remaining rotator cuff tendons are intact.   MUSCULATURE: No strain, edema, or atrophy.   AC JOINT/ACROMION: No significant arthritis or acute injury. Normal marrow and cortical signal. Type I acromion. Normal subacromial space without evidence of subacromial impingement.   BICEPS/LABRAL COMPLEX: Increased signal  in the posterior superior labrum described previously is actually less conspicuous on this study and most likely represents labral degeneration. There is no paralabral cyst. Biceps tendon is intact.   GLENOHUMERAL JOINT: Mild subchondral edema is noted in the posterior superior articular humeral head. There is subchondral cyst formation in this location in this is most likely reactive and degenerative signal. Bone contusion could have this appearance. There is no fracture detected.   CONCLUSION:   1. Tendinopathy of supraspinatus tendon is again noted and not changed since previous study. There is no full-thickness tear.   2. There is degeneration of the posterior superior labrum without tear or paralabral cyst.   3. Mild subchondral reactive edema and cyst formation posterior humeral head is most likely degenerative although could represent a bone contusion.   4. Biceps tendon is intact.   Dictated by (CST): Abhinav Brody MD on 6/23/2022 at 12:55   PM Finalized by (CST): Abhinav Brody MD on 6/23/2022 at 12:59 PM       IMPRESSION: Shruthi Arroyo is a 60 year old with left adhesive capsulitis and injury of the left rotator cuff.  In light of physical findings, we elected to order an MRI to further characterize internal derangement.      PLAN:   In light of the chronicity of symptoms, loss of normal function, and  failure to progress conservatively we recommend an MRI to evaluate the integrity of the patient's findings. The patient will follow up after imaging.   Differential diagnosis includes but not limited to: rotator cuff/labral pathology, impingement, tendinopathy, cartilage injury/loose body, bone marrow edema, and osteoarthritis.     External records were also reviewed for pertinent historical findings contributing to the patients undiagnosed new problem with uncertain prognosis.     The patient had the opportunity to ask questions, and all questions were answered appropriately.         FOLLOW-UP:    Return to clinic after MRI completion.       Leoncio Hills MD  Knee, Shoulder, & Elbow Surgery / Sports Medicine Specialist  Orthopaedic Surgery  [address] [place] [address] [id number] [place] [address].org  [email or web address]  t: [phone]  o: [phone]  f: [phone]    This note was dictated using Dragon software.  While it was briefly proofread prior to completion, some grammatical, spelling, and word choice errors due to dictation may still occur.  I, Pretty Harvey, attest that this documentation has been prepared under the direction and in the presence of Dr. Leoncio Hills MD.

## 2025-05-30 LAB — REVERSE T3: 19 NG/DL

## 2025-06-20 ENCOUNTER — HOSPITAL ENCOUNTER (OUTPATIENT)
Dept: MRI IMAGING | Facility: HOSPITAL | Age: 60
Discharge: HOME | End: 2025-06-20
Attending: ORTHOPAEDIC SURGERY
Payer: COMMERCIAL

## 2025-06-20 DIAGNOSIS — S46.002A INJURY OF LEFT ROTATOR CUFF, INITIAL ENCOUNTER: ICD-10-CM

## 2025-06-20 PROCEDURE — 73221 MRI JOINT UPR EXTREM W/O DYE: CPT | Performed by: ORTHOPAEDIC SURGERY

## 2025-07-16 ENCOUNTER — OFFICE VISIT (OUTPATIENT)
Dept: ORTHOPEDICS CLINIC | Facility: CLINIC | Age: 60
End: 2025-07-16
Payer: COMMERCIAL

## 2025-07-16 DIAGNOSIS — M75.112 NONTRAUMATIC INCOMPLETE TEAR OF LEFT ROTATOR CUFF: ICD-10-CM

## 2025-07-16 DIAGNOSIS — M19.012 OSTEOARTHRITIS OF LEFT GLENOHUMERAL JOINT: Primary | ICD-10-CM

## 2025-07-16 DIAGNOSIS — M67.922 TENDINOPATHY OF LEFT BICEPS TENDON: ICD-10-CM

## 2025-07-16 PROCEDURE — 99214 OFFICE O/P EST MOD 30 MIN: CPT | Performed by: ORTHOPAEDIC SURGERY

## 2025-07-16 NOTE — PROGRESS NOTES
[facility] - ORTHOPEDICS  [address] [place] [address]  [phone]       Name: Shruthi Arroyo   MRN: [id number]  Date: 7/16/2025     REASON FOR VISIT: Shoulder MRI review and discussion regarding next steps in management.   Last cortisone injection: 03/19/2025 and 11/10/2023.     INTERVAL HISTORY:  Shruthi Arroyo is a 60 year old right-hand dominant female who presents today for MRI review of the shoulder.     To summarize: Original left shoulder injury after a fall onto an outstretched arm on 5/14/2022. She has since been seen recurrently for adhesive capsulitis. Rated pain is 6/10 with pinching and weakness. She states that the shoulder affects her sleep.        PE:   There were no vitals filed for this visit.  Estimated body mass index is 31.96 kg/m² as calculated from the following:    Height as of 2/26/25: 5' 8\" (1.727 m).    Weight as of 2/26/25: 210 lb 3.2 oz.    Physical Exam  Constitutional:       Appearance: Normal appearance.   HENT:      Head: Normocephalic and atraumatic.   Eyes:      Extraocular Movements: Extraocular movements intact.   Neck:      Musculoskeletal: Normal range of motion and neck supple.   Cardiovascular:      Pulses: Normal pulses.   Pulmonary:      Effort: Pulmonary effort is normal. No respiratory distress.   Abdominal:      General: There is no distension.   Skin:     General: Skin is warm.      Capillary Refill: Capillary refill takes less than 2 seconds.      Findings: No bruising.   Neurological:      General: No focal deficit present.      Mental Status: Alert.   Psychiatric:         Mood and Affect: Mood normal.     Examination of the left shoulder demonstrates:     Skin is intact, warm and dry.   Cervical:  Full ROM  Spurling's  Negative    Deformity:   none  Atrophy:   none    Scapular winging: Negative    Palpation:     AC Joint   Negative  Biceps Tendon  Negative  Greater Tuberosity Negative    ROM:   Forward Flexion:   150°  Abduction:   full and symmetric  External Rotation:  60°  Internal Rotation:  full and symmetric    Rotator Cuff Strength: pain with testing.   Supraspinatus:   5/5  Subscapularis:   5/5  Infraspinatus/Teres: 5/5    Provocative Tests:   Seth:   Negative  Speed's:   Negative  [place]'s:   Equivocal  Lift-off:    Negative  Apprehension:  Negative  Sulcus Sign:   Negative    Neurovascular Upper Extremity (Bilateral)  Motor:    5/5 EPL, Finger Abduction, , Pinch, Deltoid  Sensation:   intact to light touch median, ulnar, radial and axillary nerve  Circulation:   Normal, 2+ radial pulse    The contralateral upper extremity is without limitation in range of motion or strength, no positive provocative maneuvers.       Radiographic Examination/Diagnostics:    MRI of the shoulder personally viewed, independently interpreted and radiology report was reviewed.    MRI SHOULDER, LEFT (CPT=73221)  Result Date: 6/20/2025  PROCEDURE:  MRI SHOULDER, LEFT (CPT=73221)    COMPARISON:  [facility], MRI SHOULDER, LEFT (CPT=73221), 6/23/2022, 10:11 AM.    INDICATIONS:  S46.002A Injury of left rotator cuff, initial encounter    TECHNIQUE:  Multiplanar imaging of the shoulder including oblique coronal, axial and sagittal imaging was acquired including proton density fat suppression technique. Images were performed without intravenous gadolinium.    PATIENT STATED HISTORY: (As transcribed by Technologist)  Patient states left shoulder pain witrh no known trauma.      FINDINGS:    ROTATOR CUFF:  Moderate tendinopathy and bursal surface fraying of the distal supraspinatus tendon with suspected low-grade intrasubstance tearing of anterior/middle tendon fibers near the footplate insertion (series 8, image 5).  Mild insertional tendinopathy of subscapularis.  The infraspinatus and teres minor constituents of the rotator cuff are within normal limits.   MUSCULATURE:  No strain, edema, or atrophy.    AC JOINT/ACROMION:  Type 2  acromion.  Normally aligned acromioclavicular joint demonstrates moderate capsular thickening and mild periarticular edema.  No significant subacromial spurring or lateral acromial downsloping.  Mild subacromial/subdeltoid bursitis.   BICEPS/LABRAL COMPLEX:  The long head of the biceps tendon is normally situated within the intertubercular sulcus.  Mild intra-articular biceps tendinopathy.  No gross labral tear or detachment.   GLENOHUMERAL JOINT:  No acute fracture or malalignment.  Mild glenohumeral joint space loss with small osteophytes of the inferior humeral head and glenoid.  Broad based, partial-thickness chondral loss of the anterior glenoid.  Clustered subcortical cystic change of the greater tuberosity.  Mild glenohumeral joint effusion with low signal debris of the axillary recess.  Normal capsular insertions.            CONCLUSION:     1. Moderate tendinopathy and bursal surface fraying of the distal supraspinatus tendon with suspected low-grade intrasubstance tearing of anterior/middle thirds of the distal tendon near the footplate.  Mild insertional tendinopathy of the subscapularis.    2. Progressive, moderate AC joint arthropathy compared to 2022.  Mild subacromial/subdeltoid bursitis.   3. Mild intra-articular biceps tendinopathy.    4. No gross labral tear or detachment.    5. Mild, progressive osteoarthritis of the left glenohumeral joint with mild joint effusion and small volume of intra-articular debris.      LOCATION:  [facility]     Dictated by (CST): Stephanie Fletcher MD on 6/20/2025 at 10:26 AM       Finalized by (CST): Stephanie Fletcher MD on 6/20/2025 at 10:35 AM         IMPRESSION: Shruthi Arroyo is a 60 year old with tendinopathy of left biceps tendon, nontraumatic incomplete tear of left rotator cuff and osteoarthritis of left glenohumeral joint.     We also discussed the role of future shoulder arthroplasty if deemed necessary.     PLAN:   We reviewed the treatment of this disease condition.      We provided education, and discussed at great length the use of OrthoBiologics, specifically, Platelet Rich Plasma (PRP). We discussed the growing evidence for the efficacy of PRP injections with regard to the patient's specific findings, as well as the promotion of healing for muscle, tendon, and joint injuries.     We discussed the scientific rationale for this procedure which is that the plasma contains platelets which release growth factors that induce a healing response wherever they are applied. We also discussed the benefits, risks, and limitations. The patient understands that there is a slightly higher level of complexity to this procedure compared to other injections such as cortisone, or viscosupplementation.     We also discussed the benefits of PRP in comparison to surgery, specifically including, but not limited to: less invasive than an open surgical procedure for the same condition, possible shorter recovery time, significantly more cost effective.     The patient had opportunity to ask questions and all questions were answered appropriately.      FOLLOW-UP:   Follow-up when/if she would like to proceed with PRP injection.             Leoncio Hills MD  Knee, Shoulder, & Elbow Surgery / Sports Medicine Specialist  Orthopaedic Surgery  [address] [place] [address].org  [email or web address]  t: [phone]  o: [phone]  f: [phone]      This note was dictated using Dragon software.  While it was briefly proofread prior to completion, some grammatical, spelling, and word choice errors due to dictation may still occur.  I, Pretty Harvey, attest that this documentation has been prepared under the direction and in the presence of Dr. Leoncio Hills MD.

## 2025-07-22 ENCOUNTER — OFFICE VISIT (OUTPATIENT)
Dept: FAMILY MEDICINE CLINIC | Facility: CLINIC | Age: 60
End: 2025-07-22
Payer: COMMERCIAL

## 2025-07-22 ENCOUNTER — OFFICE VISIT (OUTPATIENT)
Dept: INTEGRATIVE MEDICINE | Facility: CLINIC | Age: 60
End: 2025-07-22
Payer: COMMERCIAL

## 2025-07-22 VITALS
RESPIRATION RATE: 16 BRPM | TEMPERATURE: 97 F | HEART RATE: 89 BPM | WEIGHT: 204.38 LBS | BODY MASS INDEX: 31 KG/M2 | DIASTOLIC BLOOD PRESSURE: 74 MMHG | OXYGEN SATURATION: 98 % | SYSTOLIC BLOOD PRESSURE: 116 MMHG

## 2025-07-22 VITALS
WEIGHT: 204 LBS | DIASTOLIC BLOOD PRESSURE: 72 MMHG | HEART RATE: 77 BPM | OXYGEN SATURATION: 96 % | SYSTOLIC BLOOD PRESSURE: 114 MMHG | HEIGHT: 68 IN | BODY MASS INDEX: 30.92 KG/M2

## 2025-07-22 DIAGNOSIS — E88.819 INSULIN RESISTANCE: ICD-10-CM

## 2025-07-22 DIAGNOSIS — F43.9 STRESS: ICD-10-CM

## 2025-07-22 DIAGNOSIS — E53.8 LOW SERUM VITAMIN B12: ICD-10-CM

## 2025-07-22 DIAGNOSIS — D17.23 LIPOMA OF RIGHT LOWER EXTREMITY: ICD-10-CM

## 2025-07-22 DIAGNOSIS — Z12.31 SCREENING MAMMOGRAM, ENCOUNTER FOR: Primary | ICD-10-CM

## 2025-07-22 DIAGNOSIS — E34.8 ENDOCRINE AXIS DYSFUNCTION: ICD-10-CM

## 2025-07-22 DIAGNOSIS — G47.00 INSOMNIA, UNSPECIFIED TYPE: ICD-10-CM

## 2025-07-22 DIAGNOSIS — R22.41 MASS OF KNEE, RIGHT: ICD-10-CM

## 2025-07-22 DIAGNOSIS — E04.2 MULTIPLE THYROID NODULES: ICD-10-CM

## 2025-07-22 DIAGNOSIS — R68.89 COLD INTOLERANCE: ICD-10-CM

## 2025-07-22 DIAGNOSIS — R79.89 LOW VITAMIN D LEVEL: ICD-10-CM

## 2025-07-22 DIAGNOSIS — R53.82 CHRONIC FATIGUE: ICD-10-CM

## 2025-07-22 DIAGNOSIS — E06.3 HYPOTHYROIDISM DUE TO HASHIMOTO'S THYROIDITIS: Primary | ICD-10-CM

## 2025-07-22 DIAGNOSIS — E61.7 DEFICIENCY OF MULTIPLE NUTRIENT ELEMENTS: ICD-10-CM

## 2025-07-22 PROCEDURE — G2211 COMPLEX E/M VISIT ADD ON: HCPCS | Performed by: PHYSICIAN ASSISTANT

## 2025-07-22 PROCEDURE — 3074F SYST BP LT 130 MM HG: CPT | Performed by: FAMILY MEDICINE

## 2025-07-22 PROCEDURE — 99214 OFFICE O/P EST MOD 30 MIN: CPT | Performed by: FAMILY MEDICINE

## 2025-07-22 PROCEDURE — 3074F SYST BP LT 130 MM HG: CPT | Performed by: PHYSICIAN ASSISTANT

## 2025-07-22 PROCEDURE — 99215 OFFICE O/P EST HI 40 MIN: CPT | Performed by: PHYSICIAN ASSISTANT

## 2025-07-22 PROCEDURE — 3078F DIAST BP <80 MM HG: CPT | Performed by: PHYSICIAN ASSISTANT

## 2025-07-22 PROCEDURE — 3078F DIAST BP <80 MM HG: CPT | Performed by: FAMILY MEDICINE

## 2025-07-22 PROCEDURE — 3008F BODY MASS INDEX DOCD: CPT | Performed by: PHYSICIAN ASSISTANT

## 2025-07-22 NOTE — PATIENT INSTRUCTIONS
Start the Semaglutide/B6 from [facility] pharmacy.     This is the typical dosage:  Semaglutide/B6 2 mg / 40 mg/ML  Week 1-2 Inject 12 units or 0.12 mL subcutaneously once weekly         Week 3-5 inject 25 units or 0.25 mL subcutaneously once weekly         Week 6-7 inject 50 units or 0.5 mL subcutaneously once weekly     Alternatively, you could gradually increase even slower such as:  +Week 1-2 Inject 10 units or 0.10 mL subcutaneously once weekly   +Week 3-4 inject 20 units or 0.20 mL subcutaneously once weekly     +Week 5-6 inject 30 units or 0.30 mL subcutaneously once weekly     Message or call the nurse with any complications or concerns.     GLP-1 PROTOCOL    + Prioritize protein and vegetable intake, fresh fruits. May find it helpful to add a protein powder (+/- Greens) to supplement a meal if not very hungry.   + If constipation occurs, start Magnesium Citrate (CALM a good option as it can be titrated up/down until you have smooth and daily bowel movements).    Please send a message/call nurse at our office to request next month's dose be sent to the pharmacy - please allow a few days for the RX to be called in.    + If not eating 3 meals due to decreased hunger, consider adding a Multivitamin to ensure adequate vitamin and nutrient status.   + Maintain a good exercise program of at least 30min/day.   + Consider reading SIX FACTORS TO FIT book by Dr. Alex Harry    It is a habits-based program that uses personalized strategies instead of generic advice to help you lose weight in a healthy way. It targets YOUR issues that have been holding YOU back from losing weight and keeping it off.    Using nutritional nudges and lifestyle habit tweaks, Six Factors to Fit helps you ease into a healthier and more energetic life. More info and where to take the quiz to find out what factors personally keep you from weight loss/maintenance:   [email or web address]-factors-to-[email or web address]       ---------------------------------------------------------------------  Get labs done in the AM, fasting overnight and do not take multivitamin 48hours before blood draw.

## 2025-07-22 NOTE — PROGRESS NOTES
Shruthi Arroyo is a 60 year old female.  HPI:   Alison is here for evaluation of a lump on her knee has been there for a while has noted is getting bigger, is not painful but feels full, no known trauma, no bruising has taken nothing for it  Current Outpatient Medications   Medication Sig Dispense Refill    CUSTOM MEDICATION Semaglutide 2.5 mg/mL  Dispense: 2 mL vial  Sig: Inject 0.1 mL (0.25 mg) subcutaneously once weekly x 3-4 weeks, then        Inject 0.2 mL (0.5 mg) subcutaneously once weekly X 3-4 weeks, then if tolerated        Inject 0.4 mL (1 mg) subcutaneously once weekly 1 each 5    thyroid (NP THYROID) 60 MG Oral Tab Take 1 tablet by mouth once daily 30 tablet 5    thyroid (NP THYROID) 15 MG Oral Tab Take 1 tablet (15 mg total) by mouth daily. Take with the 60mg tablet qAM. Take at least 30-60min away from other food or supplements. 30 tablet 5    triamterene-hydroCHLOROthiazide 75-50 MG Oral Tab Take 0.5 tablets by mouth daily. 135 tablet 2      Past Medical History:    Allergic rhinitis, cause unspecified    Allergic rhinitis, cause unspecified    Anxiety    Arthritis    Mild    Bloating    Last few months - occasional    Blurred vision    Last few months.    Body piercing    Ears    Breast cancer (HCC)    Carcinoma in situ of breast    Carpal tunnel syndrome    Chronic rhinitis    Decorative tattoo    Diarrhea, unspecified    Occasional last few months    Essential hypertension, benign    Fatigue    Years. Can fall asleep any time I sit long periods    Flatulence/gas pain/belching    Last 6 months    Frequent UTI    Heart palpitations    Recently gotten worse but has happened for many years    Heavy menses    Until 2011. Had ablasion.    History of cardiac murmur    Most of my life - mild    Hoarseness, chronic    Sometimes its hard to speak. Comes w diff swallowing    Pain in joints    Problems with swallowing    Occ difficulty swallow food. Voice hoarse same time    Stress    Illness and new  job    Thyroid disorder    pt. has cysts on thyroids    Tobacco abuse    Unspecified essential hypertension    Wears glasses      Social History:  Social History     Socioeconomic History    Marital status:    Tobacco Use    Smoking status: Former     Current packs/day: 0.00     Average packs/day: 1 pack/day for 30.0 years (30.0 ttl pk-yrs)     Types: Cigarettes     Start date: 10/1/1984     Quit date: 10/1/2014     Years since quitting: 10.8    Smokeless tobacco: Never   Vaping Use    Vaping status: Never Used   Substance and Sexual Activity    Alcohol use: Yes     Alcohol/week: 3.0 standard drinks of alcohol     Types: 3 Standard drinks or equivalent per week     Comment: minimal    Drug use: No    Sexual activity: Yes     Partners: Male     Comment: No HIV high risk behavior   Other Topics Concern    Caffeine Concern Yes     Comment: 2 cups a day    Exercise Yes     Comment: 5x/week    Seat Belt Yes     Social Drivers of Health      Received from [facility]    Housing Stability        REVIEW OF SYSTEMS:   GENERAL HEALTH: feels well otherwise  SKIN: denies any unusual skin lesions or rashes  RESPIRATORY: denies shortness of breath with exertion  CARDIOVASCULAR: denies chest pain on exertion  GI: denies abdominal pain and denies heartburn  NEURO: denies headaches  EXT: lump right lateral knee  EXAM:   There were no vitals taken for this visit.  GENERAL: well developed, well nourished,in no apparent distress  SKIN: no rashes,no suspicious lesions  EXTREMITIES: no cyanosis, clubbing or edema, there is a soft irregularly shaped mass superior to the right knee joint and lateral to the patella    ASSESSMENT AND PLAN:     Encounter Diagnoses   Name Primary?    Screening mammogram, encounter for Yes    Mass of knee, right     Lipoma of right lower extremity        No orders of the defined types were placed in this encounter.      Meds & Refills for this Visit:  Requested Prescriptions      No  prescriptions requested or ordered in this encounter       Imaging & Consults:  [facility] SHAQ 2D+3D SCREENING BILAT (CPT=77067/50933)  US LEG RIGHT LIMITED (CPT=76882)     The patient indicates understanding of these issues and agrees to the plan.  The patient is asked to return in after we get results, may need MRI and ortho consult.

## 2025-07-22 NOTE — PROGRESS NOTES
Shruthi Arroyo is a 60 year old female.  Chief Complaint   Patient presents with    Follow - Up     Semaglutide - no longer taking       HPI:   60-year-old postmenopausal female patient with known history of breast carcinoma in situ with tamoxifen presents today for follow up.  Last labs 5/23/25    Has moved into RV    Weight gain -Has goal of 25-40lb weight loss  Started compounded Semgalutide (from DD) in March 2025 and had some nausea and struggled with appetite at initial dose. Worsened with increased dose. 3rd increase felt better, but then 4th increase felt sick again.  Now been off completely for about 4wks.   Less bloating. And less inflamed.  6lb weight loss.     The NeuroCalm is helping with some of the stress.     Hashimoto's: On NP Thyroid 60mg+15mg   Weight Gain  Runs colder  Energy low  Denies heart palpitations or changes to skin/nails/hair    Insomnia- bad due to left shoulder pain  Was  improved, has been able to sleep 7hours. Is on the pregnenolone and Mg which helps.   Did start the STEPHANIE as needed.     ?Menopausal -no longer having heat intolerance.  Ablation at 46 years old and history of breast carcinoma in situ.  On the Pregnenolone 30mg and DHEA 10mg    GI - Denies constipation, no more bloating; sometimes will have lose stool earlier in the AM but fine later in day    --------------------------------------------Last OV 2/26/2025 ---------------------------------------    A lot of stress at work    Started the relora more regularly more recently.     Resistant weight loss   IgG food testing     Hashimoto's hypothyroid-thyroid hormones have improved on NP thyroid 60 mg.  Thyroid antibodies remain elevated.  Considered LDN therapy    Low adrenal hormones-supplementing with DHEA-S and pregnenolone   -> Does report significantly increased stress over the last 2 months  -> Recheck adrenal hormones  -> Suspect low nutrient intake with restricted diet.  Started to add in more complex  carbohydrates and healthy fats to support energy levels, hormones, and daily workouts.  Handouts from detox and renew plan shared for meal ideas.     Insomnia- improved, has been able to sleep 7hours. Is on the pregnenolone and mg which helps.   Did start the STEPHANIE as needed.     ?Menopausal -starting to have heat intolerance.  Ablation at 46 years old and history of breast carcinoma in situ.    Fatigue persists  Did not start the PhytoMulti  --------------------------------------------Last OV---------------------------------------------------    Labs: 8/10/24 - to review today    Feels stronger with adding one day of weight training    Still feels tired   Not dropping any weight    Did not get the [employer] FIT/Infinite test yet due to finances and not getting email from [place].    Has been on the increased dose of NP 60mg since June, but unsure if it helped since stress drastically increased at the same time.  No longer with cold intolerance, but now feeling less tolerant of heat, some hot flashes over last 2-3weeks   Still with hoarse voice when stressed  Denies heart palpitations, anxiety.     Sleeping more heavy and getting 7hours  Has been on dhea and Pregnenolone.        ---------------------------------Last OV 3/26/24------------------------------    Fatigue   Better sleep - taking Mg glycinate, Pregnenolone 20mg. Has not taken the STEPHANIE yet.  Weight training 3d/wk, treadmill 2d/wk - feeling stronger but not noticing losing weight  More stress in last 2mos    Thyroid antibodies/thyromegaly/nodules -  Still fatigued, dry skin, dry mouth (which phentermine was causing), hoarse voice. Denies changes in mood, constipation, hair.   Ultrasound 8/27/23 -4 nodules, 1 new, stable.     Low DHEA-S and Pregnenolone.  Exercise recovery better. Still no libido. Is gaining more muscle with  now, but still with stagnant weight.  Fatigued by end of afternoon/early evening.     Still with sugar cravings - tries to have fruit  after meals in afternoon.    Weight Gain - Hyperinsulinemia  -strong family h/o morbid obesity and has struggled with weight issues most of her life.   -100lb weight loss with [employer] at about 35yo and was able to keep it off for awhile.   -Diagnosed with breast carcinoma in situ - had radiation and put on tamoxifen x4yrs.    -Ablation at 45yo due to menorrhagia. Quit smoking about that time. Then weight gain started despite exercise and diet changes.   -Discontinued the Phentermine, Ozempic (only on for few months but sourcing was an issue and never made it to the therapeutic) with Dr. Shannon. Ozempic did help with inflammation and she felt better, but no weight loss.      GI - regular daily BM, cholecystectomy in 1990s. Occasional bloating, particularly with fruit, so cut out most fruit.     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   H/o HTN now just on diuretic due to LE edema.   Childhood - frequent strep and sinus infections     REVIEW OF SYSTEMS:   Review of Systems   Constitutional:  Positive for fatigue. Negative for chills and fever. Unexpected weight change: weight gain.  HENT:  Positive for voice change (hoarse voice when stressed). Negative for sore throat.    Eyes: Negative.  Negative for visual disturbance.   Respiratory: Negative.  Negative for cough, shortness of breath and wheezing.    Cardiovascular: Negative.  Negative for chest pain and palpitations.   Gastrointestinal:  Negative for abdominal pain, constipation, diarrhea, nausea and vomiting.   Endocrine: Negative for cold intolerance and heat intolerance.   Genitourinary: Negative.    Musculoskeletal: Negative.    Skin: Negative.    Allergic/Immunologic: Negative.    Neurological:  Positive for tremors. Negative for weakness and headaches.   Hematological: Negative.    Psychiatric/Behavioral:  Negative for sleep disturbance.         Labile mood            FAMILY HISTORY:      Family History   Problem Relation Age of  Onset    Diabetes Father     Cancer Mother 70        breast     Breast Cancer Mother 70    Hypertension Mother     Cancer Paternal Uncle         stomach CA   Mother, overactive thyroid   All siblings - obesity    MEDICAL HISTORY:     Past Medical History:    Allergic rhinitis, cause unspecified    Allergic rhinitis, cause unspecified    Anxiety    Arthritis    Mild    Bloating    Last few months - occasional    Blurred vision    Last few months.    Body piercing    Ears    Breast cancer (HCC)    Carcinoma in situ of breast    Carpal tunnel syndrome    Chronic rhinitis    Decorative tattoo    Diarrhea, unspecified    Occasional last few months    Essential hypertension, benign    Fatigue    Years. Can fall asleep any time I sit long periods    Flatulence/gas pain/belching    Last 6 months    Frequent UTI    Heart palpitations    Recently gotten worse but has happened for many years    Heavy menses    Until 2011. Had ablasion.    History of cardiac murmur    Most of my life - mild    Hoarseness, chronic    Sometimes its hard to speak. Comes w diff swallowing    Pain in joints    Problems with swallowing    Occ difficulty swallow food. Voice hoarse same time    Stress    Illness and new job    Thyroid disorder    pt. has cysts on thyroids    Tobacco abuse    Unspecified essential hypertension    Wears glasses       CURRENT MEDICATIONS:     Current Outpatient Medications   Medication Sig Dispense Refill    CUSTOM MEDICATION Semaglutide/B6 2 mg/ 40 mg/ML  Dispense: #1- 5mL vial  Sig: Week 1-2 Inject 12 units or 0.12 mL subcutaneously once weekly         Week 3-5 inject 25 units or 0.25 mL subcutaneously once weekly         Week 6-7 inject 50 units or 0.5 mL subcutaneously once weekly 1 each 0    thyroid (NP THYROID) 60 MG Oral Tab Take 1 tablet by mouth once daily 30 tablet 5    thyroid (NP THYROID) 15 MG Oral Tab Take 1 tablet (15 mg total) by mouth daily. Take with the 60mg tablet qAM. Take at least 30-60min away from  other food or supplements. 30 tablet 5    triamterene-hydroCHLOROthiazide 75-50 MG Oral Tab Take 0.5 tablets by mouth daily. 135 tablet 2    CUSTOM MEDICATION Semaglutide 2.5 mg/mL  Dispense: 2 mL vial  Sig: Inject 0.1 mL (0.25 mg) subcutaneously once weekly x 3-4 weeks, then        Inject 0.2 mL (0.5 mg) subcutaneously once weekly X 3-4 weeks, then if tolerated        Inject 0.4 mL (1 mg) subcutaneously once weekly (Patient not taking: Reported on 7/22/2025) 1 each 5       SOCIAL HISTORY:   Lifestyle Factors affecting health:  Diet - Avoids carbs and gluten.   Eating a lot more protein.  Egg whites and cottage cheese for breakfast, protein bar or drink for lunch. Chicken and mixed veggies, fruit for dinner. Limits veggies and carbs.  Fruit oops once in awhile (her favorite)   Coffee with vanilla peptides    Exercise - regular, will no longer work with , but has routines to do at home, walks dogs    Stress - improved over last 6mos, but fluctuates  Sleep - Better. Had Not been good for years. Up at 4am for work, to bed by 9:30pm.     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 [employer]    Social History     Socioeconomic History    Marital status:    Tobacco Use    Smoking status: Former     Current packs/day: 0.00     Average packs/day: 1 pack/day for 30.0 years (30.0 ttl pk-yrs)     Types: Cigarettes     Start date: 10/1/1984     Quit date: 10/1/2014     Years since quitting: 10.8    Smokeless tobacco: Never   Vaping Use    Vaping status: Never Used   Substance and Sexual Activity    Alcohol use: Yes     Alcohol/week: 3.0 standard drinks of alcohol     Types: 3 Standard drinks or equivalent per week     Comment: minimal    Drug use: No    Sexual activity: Yes     Partners: Male     Comment: No HIV high risk behavior   Other Topics Concern    Caffeine Concern Yes     Comment: 2 cups a day    Exercise Yes     Comment: 5x/week    Seat Belt Yes     Social Drivers of Health      Received from [facility]     Housing Stability       SURGICAL HISTORY:     Past Surgical History:   Procedure Laterality Date    Biopsy of breast, incisional      Breast surgery      Cholecystectomy      Colonoscopy N/A 1/31/2017    Procedure: COLONOSCOPY, POSSIBLE BIOPSY, POSSIBLE POLYPECTOMY 22363;  Surgeon: Luc Calabrese MD;  Location: [facility]    Colonoscopy & polypectomy  1/17- repeat 1/18    mulltiple polyps, tics (adenomas, TVA)    Gastro - dmg  1/17    esophageal nodule (papilloma)    Lumpectomy left  01/03/2011    DCIS    Kwame localization wire 1 site right (cpt=19281) Right     BENIGN   Late 90's     Radiation left Left     2011    Removal gallbladder         PHYSICAL EXAM:     Vitals:    07/22/25 1519   BP: 114/72   BP Location: Right arm   Patient Position: Sitting   Cuff Size: adult   Pulse: 77   SpO2: 96%   Weight: 204 lb (92.5 kg)   Height: 5' 8\" (1.727 m)                 Physical Exam  Vitals reviewed.   Constitutional:       General: She is not in acute distress.     Appearance: Normal appearance.   HENT:      Mouth/Throat:      Mouth: Mucous membranes are moist.      Pharynx: Oropharynx is clear.   Eyes:      Extraocular Movements: Extraocular movements intact.      Conjunctiva/sclera: Conjunctivae normal.   Neck:      Thyroid: No thyromegaly.   Cardiovascular:      Rate and Rhythm: Normal rate and regular rhythm.      Pulses: Normal pulses.      Heart sounds: Normal heart sounds. No murmur heard.  Pulmonary:      Effort: Pulmonary effort is normal.      Breath sounds: Normal breath sounds.   Musculoskeletal:         General: No swelling or deformity.   Skin:     General: Skin is warm and dry.   Neurological:      General: No focal deficit present.      Mental Status: She is alert and oriented to person, place, and time.   Psychiatric:         Mood and Affect: Mood normal.         Behavior: Behavior normal.         Thought Content: Thought content normal.         Judgment: Judgment normal.          ASSESSMENT AND  PLAN:     Count includes the [facility] Lab Encounter on 05/23/2025   Component Date Value Ref Range Status    TSH 05/23/2025 1.888  0.550 - 4.780 uIU/mL Final    Free T4 05/23/2025 1.2  0.8 - 1.7 ng/dL Final    T3 Free 05/23/2025 3.00  2.40 - 4.20 pg/mL Final    Reverse T3 05/23/2025 19.0  9.2 - 24.1 ng/dL Final   Count includes the [facility] Lab Encounter on 02/26/2025   Component Date Value Ref Range Status    Glucose 02/26/2025 95  70 - 99 mg/dL Final    Sodium 02/26/2025 141  136 - 145 mmol/L Final    Potassium 02/26/2025 3.5  3.5 - 5.1 mmol/L Final    Chloride 02/26/2025 105  98 - 112 mmol/L Final    CO2 02/26/2025 31.0  21.0 - 32.0 mmol/L Final    Anion Gap 02/26/2025 5  0 - 18 mmol/L Final    BUN 02/26/2025 16  9 - 23 mg/dL Final    Creatinine 02/26/2025 1.09 (H)  0.55 - 1.02 mg/dL Final    BUN/CREA Ratio 02/26/2025 14.7  10.0 - 20.0 Final    Calcium, Total 02/26/2025 9.4  8.7 - 10.4 mg/dL Final    Calculated Osmolality 02/26/2025 293  275 - 295 mOsm/kg Final    eGFR-Cr 02/26/2025 59 (L)  >=60 mL/min/1.73m2 Final    ALT 02/26/2025 24  10 - 49 U/L Final    AST 02/26/2025 27  <34 U/L Final    Alkaline Phosphatase 02/26/2025 105  46 - 118 U/L Final    Bilirubin, Total 02/26/2025 1.0  0.3 - 1.2 mg/dL Final    Total Protein 02/26/2025 7.0  5.7 - 8.2 g/dL Final    Albumin 02/26/2025 4.5  3.2 - 4.8 g/dL Final    Globulin  02/26/2025 2.5  2.0 - 3.5 g/dL Final    A/G Ratio 02/26/2025 1.8  1.0 - 2.0 Final    Patient Fasting for CMP? 02/26/2025 Yes   Final    DHEA Sulfate 02/26/2025 118.7  25.9 - 460.2 ug/dL Final    This test may exhibit interference of greater than 10% when a sample is collected from a person who is consuming high dose of biotin (a.k.a., vitamin B7, vitamin H, coenzyme R) supplements resulting in serum concentrations &gt;12.5 ng/mL, leading to either falsely elevated or falsely depressed results.  Intake of the recommended daily allowance (RDA) for biotin (0.03 mg) has not been shown to typically cause significant interference; however, high dose  daily dietary supplements may contain biotin concentrations greater than 150 times (5-10 mg) the RDA.  It is recommended that physicians ask all patients who may be on biotin supplementation to stop biotin consumption at least 72 hours prior to collection of a new sample.    Pregnenolone 02/26/2025 26  ng/dL Final    This test was developed and its performance characteristics  determined by [facility]. It has not been cleared or approved  by the Food and Drug Administration.  Reference Range:  Adults: <151    Vitamin B12 02/26/2025 464  211 - 911 pg/mL Final    Vitamin B12 Reference Range   Deficient:      <150 pg/mL   Indeterminate   150 - 211 pg/mL  Normal:         211 - 911 pg/mL       TSH 02/26/2025 2.714  0.550 - 4.780 uIU/mL Final    Free T4 02/26/2025 1.1  0.8 - 1.7 ng/dL Final    T3 Free 02/26/2025 2.96  2.40 - 4.20 pg/mL Final    Reverse T3 02/26/2025 17.8  9.2 - 24.1 ng/dL Final    Vitamin D, 25OH, Total 02/26/2025 74.2  30.0 - 100.0 ng/mL Final    Literature Recommendations for 25(OH)D levels are:  Range           Vitamin D Status   <20    ng/mL      Deficiency   20-<30 ng/mL      Insufficiency    ng/mL      Sufficiency   >100   ng/mL      Toxicity    *Clinical controversy exists regarding optimal 25(OH)D levels. Emerging evidence links potential adverse effects to high levels, particularly >60 ng/mL.          MRI SHOULDER, LEFT (CPT=73221)  Result Date: 6/20/2025  CONCLUSION:   1. Moderate tendinopathy and bursal surface fraying of the distal supraspinatus tendon with suspected low-grade intrasubstance tearing of anterior/middle thirds of the distal tendon near the footplate.  Mild insertional tendinopathy of the subscapularis.  2. Progressive, moderate AC joint arthropathy compared to 2022.  Mild subacromial/subdeltoid bursitis.  3. Mild intra-articular biceps tendinopathy.  4. No gross labral tear or detachment.  5. Mild, progressive osteoarthritis of the left glenohumeral joint with mild joint  effusion and small volume of intra-articular debris.    LOCATION:  [facility]   Dictated by (CST): Stephanie Fletcher MD on 6/20/2025 at 10:26 AM     Finalized by (CST): Stephanie Fletcher MD on 6/20/2025 at 10:35 AM         1. Hypothyroidism due to Hashimoto's thyroiditis  - Assay, Thyroid Stim Hormone; Future  - Free T4, (Free Thyroxine); Future  - Free T3 (Triiodothryronine); Future  - Reverse T3, Serum; Future  - Thyroid Antithyroglobulin AB; Future  - Thyroid Peroxidase (TPO) AB; Future    2. Chronic fatigue    3. Insomnia, unspecified type    4. Insulin resistance  - Insulin; Future  - Hemoglobin A1C; Future    5. Low vitamin D level  - Vitamin D; Future    6. Endocrine axis dysfunction  - Dehydroepiandrosterone Sulfate; Future  - Pregnenolone by MS/MS, Serum; Future    7. Stress    8. Multiple thyroid nodules    9. Cold intolerance    10. Low serum vitamin B12  - Vitamin B12; Future  - Vitamin B6; Future  - Folic Acid Serum (Folate); Future    11. Deficiency of multiple nutrient elements  - Vitamin B12; Future  - Vitamin B6; Future  - Folic Acid Serum (Folate); Future          Time spent with patient: Over 50 minutes spent in chart review and in direct communication with patient obtaining and reviewing history, creating a unique care plan, explaining the rationale for treatment, reviewing potential SE and overall treatment plan,  documenting all clinical information in Epic. Over 50% of this time was in education, counseling and coordination of care.     Problem List Items Addressed This Visit          Endocrine and Metabolic    Multiple thyroid nodules     Other Visit Diagnoses         Hypothyroidism due to Hashimoto's thyroiditis    -  Primary    Relevant Orders    Assay, Thyroid Stim Hormone    Free T4, (Free Thyroxine)    Free T3 (Triiodothryronine)    Reverse T3, Serum    Thyroid Antithyroglobulin AB    Thyroid Peroxidase (TPO) AB      Chronic fatigue          Insomnia, unspecified type          Insulin resistance         Relevant Orders    Insulin    Hemoglobin A1C      Low vitamin D level        Relevant Orders    Vitamin D      Endocrine axis dysfunction        Relevant Orders    Dehydroepiandrosterone Sulfate    Pregnenolone by MS/MS, Serum      Stress          Cold intolerance          Low serum vitamin B12        Relevant Orders    Vitamin B12    Vitamin B6    Folic Acid Serum (Folate)      Deficiency of multiple nutrient elements        Relevant Orders    Vitamin B12    Vitamin B6    Folic Acid Serum (Folate)                Endocrine dysfunction:  Hashimoto's hypothyroid-thyroid hormones have finally stabilized on NP thyroid 75 mg.    +Thyroid antibodies   -> Recheck in labs  -> May still Consider LDN therapy in the future  Last ultrasound 2023: Mult nodules, 1 new  ->Repeat U/S    Low adrenal hormones-supplementing with DHEA-S and pregnenolone   -> Does report significantly increased stress over the last 12 months  -> Recheck adrenal hormones     Insomnia-was improved but has returned due to shoulder pain    Stress very high - professionally.  She is making some positive shifts moving forward in her professional and personal space.    ?Menopausal.  Ablation at 46 years old and history of breast carcinoma in situ.    Resistant weight loss due to all of the above as well as ongoing inflammation   Pt would like to proceed with trying GLP-1 again -discussed how to safely and slowly increase the dose.  Will try from a different compounding pharmacy this time, compounded with vitamin B6.  She did have benefits of some weight loss, reduction in inflammation, and less bloating.    Fatigue persists  -> Check vitamin D and vitamin B12 levels  -> Has to take the Xu multivitamin when taking the semaglutide as she had an aversion to the PhytoMulti    Follow-up in 2-3 months.      Orders Placed This Visit:  Orders Placed This Encounter   Procedures    Dehydroepiandrosterone Sulfate    Pregnenolone by MS/MS, Serum    Vitamin D    Insulin     Hemoglobin A1C    Assay, Thyroid Stim Hormone    Free T4, (Free Thyroxine)    Free T3 (Triiodothryronine)    Reverse T3, Serum    Thyroid Antithyroglobulin AB    Thyroid Peroxidase (TPO) AB    Vitamin B12    Vitamin B6    Folic Acid Serum (Folate)     No orders of the defined types were placed in this encounter.      Patient Instructions   Start the Semaglutide/B6 from [facility] pharmacy.     This is the typical dosage:  Semaglutide/B6 2 mg / 40 mg/ML  Week 1-2 Inject 12 units or 0.12 mL subcutaneously once weekly         Week 3-5 inject 25 units or 0.25 mL subcutaneously once weekly         Week 6-7 inject 50 units or 0.5 mL subcutaneously once weekly     Alternatively, you could gradually increase even slower such as:  +Week 1-2 Inject 10 units or 0.10 mL subcutaneously once weekly   +Week 3-4 inject 20 units or 0.20 mL subcutaneously once weekly     +Week 5-6 inject 30 units or 0.30 mL subcutaneously once weekly     Message or call the nurse with any complications or concerns.     GLP-1 PROTOCOL    + Prioritize protein and vegetable intake, fresh fruits. May find it helpful to add a protein powder (+/- Greens) to supplement a meal if not very hungry.   + If constipation occurs, start Magnesium Citrate (CALM a good option as it can be titrated up/down until you have smooth and daily bowel movements).    Please send a message/call nurse at our office to request next month's dose be sent to the pharmacy - please allow a few days for the RX to be called in.    + If not eating 3 meals due to decreased hunger, consider adding a Multivitamin to ensure adequate vitamin and nutrient status.   + Maintain a good exercise program of at least 30min/day.   + Consider reading SIX FACTORS TO FIT book by Dr. Alex Harry    It is a habits-based program that uses personalized strategies instead of generic advice to help you lose weight in a healthy way. It targets YOUR issues that have been holding YOU back from losing weight and  keeping it off.    Using nutritional nudges and lifestyle habit tweaks, Six Factors to Fit helps you ease into a healthier and more energetic life. More info and where to take the quiz to find out what factors personally keep you from weight loss/maintenance:   https://[employer]/six-factors-to-fit/      ---------------------------------------------------------------------  Get labs done in the AM, fasting overnight and do not take multivitamin 48hours before blood draw.        Return in about 3 months (around 10/22/2025) for x60min.    Patient affirmed understanding of plan and all questions were answered.     Daniela Forrest PA-C

## 2025-07-23 ENCOUNTER — PATIENT MESSAGE (OUTPATIENT)
Dept: FAMILY MEDICINE CLINIC | Facility: CLINIC | Age: 60
End: 2025-07-23

## 2025-07-23 ENCOUNTER — PATIENT MESSAGE (OUTPATIENT)
Dept: ORTHOPEDICS CLINIC | Facility: CLINIC | Age: 60
End: 2025-07-23

## 2025-07-24 NOTE — TELEPHONE ENCOUNTER
LOV 7/16/25  PRP was recommended vs surgery.  Pt states she spoke with PCP who she says recommends surgery.   Is surgery an option and does she need follow up for a full surgical consult (or was this addressed at LOV?)

## 2025-07-26 ENCOUNTER — LAB ENCOUNTER (OUTPATIENT)
Dept: LAB | Age: 60
End: 2025-07-26
Attending: PHYSICIAN ASSISTANT
Payer: COMMERCIAL

## 2025-07-26 DIAGNOSIS — E53.8 LOW SERUM VITAMIN B12: ICD-10-CM

## 2025-07-26 DIAGNOSIS — E61.7 DEFICIENCY OF MULTIPLE NUTRIENT ELEMENTS: ICD-10-CM

## 2025-07-26 DIAGNOSIS — E06.3 HYPOTHYROIDISM DUE TO HASHIMOTO'S THYROIDITIS: ICD-10-CM

## 2025-07-26 DIAGNOSIS — E34.8 ENDOCRINE AXIS DYSFUNCTION: ICD-10-CM

## 2025-07-26 DIAGNOSIS — R79.89 LOW VITAMIN D LEVEL: ICD-10-CM

## 2025-07-26 DIAGNOSIS — E88.819 INSULIN RESISTANCE: ICD-10-CM

## 2025-07-26 LAB
DHEA-S SERPL-MCNC: 118.8 UG/DL (ref 25.9–460.2)
EST. AVERAGE GLUCOSE BLD GHB EST-MCNC: 103 MG/DL (ref 68–126)
FOLATE SERPL-MCNC: 7.2 NG/ML (ref 5.4–?)
HBA1C MFR BLD: 5.2 % (ref ?–5.7)
INSULIN SERPL-ACNC: 19.7 MU/L (ref 3–25)
T3FREE SERPL-MCNC: 3.15 PG/ML (ref 2.4–4.2)
T4 FREE SERPL-MCNC: 1.2 NG/DL (ref 0.8–1.7)
THYROGLOB SERPL-MCNC: 113 U/ML (ref ?–60)
THYROPEROXIDASE AB SERPL-ACNC: 2097 U/ML (ref ?–60)
TSI SER-ACNC: 1.43 UIU/ML (ref 0.55–4.78)
VIT B12 SERPL-MCNC: 945 PG/ML (ref 211–911)
VIT D+METAB SERPL-MCNC: 68.4 NG/ML (ref 30–100)

## 2025-07-26 PROCEDURE — 84439 ASSAY OF FREE THYROXINE: CPT

## 2025-07-26 PROCEDURE — 82746 ASSAY OF FOLIC ACID SERUM: CPT

## 2025-07-26 PROCEDURE — 82607 VITAMIN B-12: CPT

## 2025-07-26 PROCEDURE — 82627 DEHYDROEPIANDROSTERONE: CPT

## 2025-07-26 PROCEDURE — 84207 ASSAY OF VITAMIN B-6: CPT

## 2025-07-26 PROCEDURE — 84140 ASSAY OF PREGNENOLONE: CPT

## 2025-07-26 PROCEDURE — 86800 THYROGLOBULIN ANTIBODY: CPT

## 2025-07-26 PROCEDURE — 83525 ASSAY OF INSULIN: CPT

## 2025-07-26 PROCEDURE — 36415 COLL VENOUS BLD VENIPUNCTURE: CPT

## 2025-07-26 PROCEDURE — 83036 HEMOGLOBIN GLYCOSYLATED A1C: CPT

## 2025-07-26 PROCEDURE — 84443 ASSAY THYROID STIM HORMONE: CPT

## 2025-07-26 PROCEDURE — 84481 FREE ASSAY (FT-3): CPT

## 2025-07-26 PROCEDURE — 84482 T3 REVERSE: CPT

## 2025-07-26 PROCEDURE — 82306 VITAMIN D 25 HYDROXY: CPT

## 2025-07-26 PROCEDURE — 86376 MICROSOMAL ANTIBODY EACH: CPT

## 2025-07-30 LAB — REVERSE T3: 21 NG/DL

## 2025-07-31 LAB — VITAMIN B6: 14.7 UG/L

## 2025-08-02 LAB — PREGNENOLONE: 79 NG/DL

## 2025-08-15 ENCOUNTER — OFFICE VISIT (OUTPATIENT)
Dept: ORTHOPEDICS CLINIC | Facility: CLINIC | Age: 60
End: 2025-08-15

## 2025-08-15 DIAGNOSIS — M19.019 AC JOINT ARTHROPATHY: ICD-10-CM

## 2025-08-15 DIAGNOSIS — M75.112 NONTRAUMATIC INCOMPLETE TEAR OF LEFT ROTATOR CUFF: Primary | ICD-10-CM

## 2025-08-15 DIAGNOSIS — M67.922 TENDINOPATHY OF LEFT BICEPS TENDON: ICD-10-CM

## 2025-08-15 DIAGNOSIS — M75.02 ADHESIVE CAPSULITIS OF LEFT SHOULDER: ICD-10-CM

## 2025-08-15 PROCEDURE — 99417 PROLNG OP E/M EACH 15 MIN: CPT | Performed by: ORTHOPAEDIC SURGERY

## 2025-08-15 PROCEDURE — 99215 OFFICE O/P EST HI 40 MIN: CPT | Performed by: ORTHOPAEDIC SURGERY

## (undated) DIAGNOSIS — G56.01 CARPAL TUNNEL SYNDROME ON RIGHT: Primary | ICD-10-CM

## (undated) DIAGNOSIS — E66.09 OBESITY DUE TO EXCESS CALORIES WITHOUT SERIOUS COMORBIDITY, UNSPECIFIED CLASSIFICATION: Primary | ICD-10-CM

## (undated) NOTE — LETTER
04/24/19    [address] [facility] [id number]      Dear Real Nieves,    To help us provide the highest quality medical care, [facility] uses a sophisticated computer system to track our patient records.

## (undated) NOTE — ED AVS SNAPSHOT
[facility] in [facility] [address] [facility] [address] [address]    Phone:  [phone]    Fax:  [phone]   MRN: [id number]    Department:  [facility] in [facility]   Date of Visit:  3/7/2017           [place] - [address] AT [address] [place] [address], [phone], [phone]  [address], [facility] [address]     Phone:  [phone]    - Albuterol Sulfate  (93 Base) MCG/ACT Aers  - Doxycycline Hyclate 100 MG Caps  - predniSONE 20 MG Tabs this physician (or your personal doctor if your instructions are to return to your personal doctor) about any new or lasting problems. The primary care or specialist physician will see patients referred from the [facility].  Follow-up care is at you to explore options for quitting.     - If you have concerns related to behavioral health issues or thoughts of harming yourself, contact [address] [facility] at [phone].     - If you don’t have insurance, [employer] Summaries. If you've been to the Emergency Department or your doctor's office, you can view your past visit information in [employer] by going to Visits < Visit Summaries. [employer]scene questions? Call [phone] for help.   [employer] is NOT to be used for urge

## (undated) NOTE — LETTER
Date: 5/16/2022    Patient Name: Shefali Parker          To Whom it may concern: This letter has been written at the patient's request. The above patient was seen at the [facility] for treatment of a medical condition. This patient should be excused from attending work/school from *** through ***. The patient may return to work/school on *** with the following limitations ***.         Sincerely,    Shreya Johnston DO

## (undated) NOTE — LETTER
08/17/17      [address] [place] [address]        Dear Josiah Almanza      To help us provide the highest quality medical care, [facility] uses a sophisticated computer system to track our patient records.

## (undated) NOTE — ED AVS SNAPSHOT
THE [facility] in  Bianca Arrieta [address] [address]    Phone:  [phone]    Fax:  [address]   MRN: [id number]    Department:  THE [facility] in [facility]   Date of Visit:  5/8/2017           [place] URINARY TRACT INFECTIONS (UTIS), UNDERSTANDING (ENGLISH)      Disclosure     Insurance plans vary and the physician(s) referred by the Immediate Care may not be covered by your plan.  Please contact your insurance company to determine coverage for follow-u CARE PHYSICIAN AT ONCE OR GO TO THE EMERGENCY DEPARTMENT. If you have been prescribed any medication(s), please fill your prescription right away and begin taking the medication(s) as directed.     If the Immediate Care Provider has read X-rays, these wi coverage. Patient [address] is a Federal Navigator program that can help with your Affordable Care Act coverage, as well as all types of Medicaid plans.   To get signed up and covered, please call [phone] and ask to get set up for an insuran

## (undated) NOTE — LETTER
To Whom It May Concern:    Michael Matamoros has been under our care regarding ongoing medical issues. Because of this, she has been required to restrict her physical activities. She may resume her usual activities, including work, on 5/23/22 with the following restrictions:    []  None     [x]    No heavy lifting (over 15 pounds) for       1        weeks   []    Part-time (no more than             hours per week) for               week   []  Other:        Please feel free to contact us if there are any questions.       Sincerely,      Justino Walsh DO  [address] [facility], [address]  [facility] [phone]          Document generated by:  Justino Walsh DO

## (undated) NOTE — LETTER
4/16/2019        [place] [address]      Dear Shivani Alvarado. To help us provide the highest quality medical care, [facility] uses a sophisticated computer system to track our patient records.

## (undated) NOTE — ED AVS SNAPSHOT
THE [facility] in [facility] [address] [address]    Phone:  [phone]    Fax:  [address]   MRN: [id number]    Department:  THE [facility] in [facility]   Date of Visit:  6/4/2017           [place] Discharge Instructions       Push clear fluids. Please take antibiotic as written. If urine culture dictates a change in therapy, you will be contacted. Please return for worsening condition.     Discharge References/Attachments     URINARY TRACT care or specialist physician will see patients referred from the [facility]. Follow-up care is at the discretion of that Physician.     IF THERE IS ANY CHANGE OR WORSENING OF YOUR CONDITION, CALL YOUR PRIMARY CARE PHYSICIAN AT ONCE OR GO TO THE harming yourself, contact [facility] at [phone]. - If you don’t have insurance, [employer] has partnered with Patient [address] to help you get signed up for insurance coverage.   Patient [place]

## (undated) NOTE — LETTER
[place] [address]    5/24/2019      Dear  Alek Keller    In order to provide the highest quality care, [facility] uses a sophisticated computer system to track our

## (undated) NOTE — LETTER
01/27/20  [place] [address]      Dear Rubén Herman. To help us provide the highest quality medical care, [facility] uses a sophisticated computer system to track our patient records.  Curtis

## (undated) NOTE — LETTER
[address] [place] [address]    1/7/2020      Dear  Gonzalo Sinclair    In order to provide the highest quality care, [facility] uses a sophisticated computer system to track our

## (undated) NOTE — LETTER
09/26/20          [place] [address]           Dear Lashon Humphries records indicate that you have outstanding lab work and or testing that was ordered for you and has not yet been completed:  Eleonora Westbrook

## (undated) NOTE — ED AVS SNAPSHOT
THE [facility] in [facility] [address] [address]    Phone:  [phone]    Fax:  [address]   MRN: [id number]    Department:  THE [facility] in [facility]   Date of Visit:  3/5/2017           [place] [phone] [address] [facility], [address]  [phone] [address] [place] [address] [address]   [phone]       To Check ER Wait Times:  TEXT 'Britney Paul' to [id number]      Click [email or web address] reading, you will be contacted. Please make sure we have your correct phone number before you leave. After you leave, you should follow the attached instructions. I have read and understand the instructions given to me by my caregivers.         24-Hour You can access your MyChart to more actively manage your health care and view more details from this visit by going to [email or web address].   If you've recently had a stay at the Hospital you can access your discharge instructions in [address] by jenaro

## (undated) NOTE — LETTER
[facility] CARE IN [place]  [address] [address]  Dept: [phone]  Dept Fax: [phone]      March 7, 2017    Patient: Mi Moran   Date of Visit: 3/7/2017       To Whom It May Concern:    Ofelia Massey was seen and treate

## (undated) NOTE — LETTER
Date & Time: 8/17/2019, 3:44 PM  Patient: Ruth Otero  Encounter Provider(s):    Humble Childers       To Whom It May Concern:    Benigno Mendoza was seen and treated in our department on 8/17/2019. She should not return to work until 8/20/19.

## (undated) NOTE — MR AVS SNAPSHOT
[address] [facility] [address]  [phone]               Thank you for choosing us for your health care visit with Alyssa Whitaker DO.   We are glad to serve you and happy to provide you with this sum Allergies as of Jun 19, 2017     Latex     Penicillins Hives                Today's Vital Signs     BP Pulse Temp Height Weight BMI    108/64 mmHg 68 98.5 °F (36.9 °C) (Temporal) 68\" 178 lb 6.4 oz 27.13 kg/m2         Current Medications          This list